# Patient Record
Sex: FEMALE | Race: WHITE | Employment: OTHER | URBAN - METROPOLITAN AREA
[De-identification: names, ages, dates, MRNs, and addresses within clinical notes are randomized per-mention and may not be internally consistent; named-entity substitution may affect disease eponyms.]

---

## 2017-03-14 ENCOUNTER — OFFICE VISIT (OUTPATIENT)
Dept: SURGERY | Age: 72
End: 2017-03-14

## 2017-03-14 VITALS
WEIGHT: 197 LBS | BODY MASS INDEX: 29.86 KG/M2 | TEMPERATURE: 98.2 F | HEIGHT: 68 IN | RESPIRATION RATE: 20 BRPM | DIASTOLIC BLOOD PRESSURE: 82 MMHG | SYSTOLIC BLOOD PRESSURE: 126 MMHG | HEART RATE: 82 BPM

## 2017-03-14 DIAGNOSIS — K64.8 INTERNAL AND EXTERNAL HEMORRHOIDS WITHOUT COMPLICATION: Primary | ICD-10-CM

## 2017-03-14 DIAGNOSIS — K64.4 INTERNAL AND EXTERNAL HEMORRHOIDS WITHOUT COMPLICATION: Primary | ICD-10-CM

## 2017-03-14 RX ORDER — GLUCOSAMINE HCL 500 MG
2000 TABLET ORAL DAILY
COMMUNITY

## 2017-03-14 RX ORDER — HYDROCHLOROTHIAZIDE 12.5 MG/1
12.5 TABLET ORAL DAILY
COMMUNITY
End: 2021-10-19

## 2017-03-14 RX ORDER — RANITIDINE 300 MG/1
300 CAPSULE ORAL DAILY
COMMUNITY
End: 2021-06-18

## 2017-03-14 RX ORDER — LOSARTAN POTASSIUM 50 MG/1
50 TABLET ORAL DAILY
COMMUNITY

## 2017-03-14 RX ORDER — MONTELUKAST SODIUM 10 MG/1
10 TABLET ORAL DAILY
COMMUNITY
End: 2021-06-18

## 2017-03-14 RX ORDER — CETIRIZINE HCL 10 MG
10 TABLET ORAL DAILY
COMMUNITY

## 2017-03-14 NOTE — PROGRESS NOTES
Patient presents for symptoms of IBS. She has previously seen Dr. Ravi Choe for internal hemorrhoid and sees Dr. Summer Smith for gastroenterology. Patient reports concerns over her GI lisy and states that she has had a hx of C-diff. She also is treated for GERD and has questions about which medications she should be taking. Her symptoms include abdominal pain with BM, frequency of bowel, loose stools alternating with constipation and spotting blood with BMs.

## 2017-03-14 NOTE — PATIENT INSTRUCTIONS
If you have any questions or concerns about today's appointment, the verbal and/or written instructions you were given for follow up care, please call our office at [phone]. [address] [facility] - Jazmin Thakkar [address] [place] [address], [address] [place] [address]    [phone] office  [phone]

## 2017-03-14 NOTE — LETTER
3/14/2017 1:58 PM 
 
Patient:  Courtney Bennett YOB: 1945 Date of Visit: 3/14/2017 Jimenez Bernabe MD 
[address] [place], [place] [id number] VIA Facsimile: [phone] Dear Jimenez Bernabe MD, Thank you for referring Ms. Courtney Bennett to [place] [address] for evaluation and treatment. Below are the relevant portions of my assessment and plan of care. Thank you very much for your referral of Ms. Courtney Bennett. If you have questions, please do not hesitate to call me. I look forward to following Ms. Lino along with you and will keep you updated as to her progress. Sincerely, Sylvia Marie MD

## 2017-03-14 NOTE — PROGRESS NOTES
[facility]  Colon and Rectal Surgery              Colon and Rectal Surgery        Patient: Suly Coello  MRN: [id number]  Date: 3/14/2017     Age:  73 y.o.,      Sex: female    YOB: 1945      Subjective    Ms. Alessio Rodriguez is an 73 y.o. female referred by Dr. Emir Cruz. She presents with intermittent anal discomfort and rare episodes of bright red rectal bleeding mainly on tissue post defecation. The patient's main concern is her chronic irritable bowel syndrome with alternating constipation and mainly loose frequent stools and diarrhea along with abdominal cramps and pain. This has been present since 2010, and colonoscopy at that time was noted to be unremarkable as per the patient. With frequent loose stools, the patient complains of anal itching and burning while she experiences bleeding with constipation. She has not had previous rectal surgery. Previous treatments have included topical interventions with Anal pram with success.  denies associated fever. A history of inflammatory bowel disease has not been reported. Otherwise the patient denies any change in bowel habits, weight changes, nor any nausea, emesis. The family history is negative for colon cancer/polyps, other GI malignancies, nor inflammatory bowel diseases. Past Medical History:   Diagnosis Date    Breast cancer [facility])     age 41    C. difficile diarrhea     GERD (gastroesophageal reflux disease)     HTN (hypertension)     Stool color black        Past Surgical History:   Procedure Laterality Date    HX COLONOSCOPY  2010    HX MASTECTOMY Right     age 41       Allergies   Allergen Reactions    Azithromycin Diarrhea    Erythromycin Diarrhea     GI distress       Prior to Admission medications    Medication Sig Start Date End Date Taking? Authorizing Provider   raNITIdine hcl 300 mg cap Take 300 mg by mouth daily.    Yes Historical Provider   losartan (COZAAR) 50 mg tablet Take 50 mg by mouth daily. Yes Historical Provider   hydroCHLOROthiazide (HYDRODIURIL) 12.5 mg tablet Take 12.5 mg by mouth daily. Yes Historical Provider   montelukast (SINGULAIR) 10 mg tablet Take 10 mg by mouth daily. Yes Historical Provider   cetirizine (ZYRTEC) 10 mg tablet Take 10 mg by mouth daily. Yes Historical Provider   OMEPRAZOLE PO Take 40 mg by mouth daily. Yes Historical Provider   Cholecalciferol, Vitamin D3, 3,000 unit tab Take 2,000 Units by mouth daily. Yes Historical Provider   Bifidobacterium Infantis (ALIGN) 4 mg cap Take  by mouth. Yes Historical Provider   BUDESONIDE/FORMOTEROL FUMARATE (SYMBICORT IN) Take  by inhalation. Historical Provider   candesartan (ATACAND) 16 mg tablet Take  by mouth daily. Historical Provider       Current Outpatient Prescriptions   Medication Sig Dispense Refill    raNITIdine hcl 300 mg cap Take 300 mg by mouth daily.  losartan (COZAAR) 50 mg tablet Take 50 mg by mouth daily.  hydroCHLOROthiazide (HYDRODIURIL) 12.5 mg tablet Take 12.5 mg by mouth daily.  montelukast (SINGULAIR) 10 mg tablet Take 10 mg by mouth daily.  cetirizine (ZYRTEC) 10 mg tablet Take 10 mg by mouth daily.  OMEPRAZOLE PO Take 40 mg by mouth daily.  Cholecalciferol, Vitamin D3, 3,000 unit tab Take 2,000 Units by mouth daily.  Bifidobacterium Infantis (ALIGN) 4 mg cap Take  by mouth.  BUDESONIDE/FORMOTEROL FUMARATE (SYMBICORT IN) Take  by inhalation.  candesartan (ATACAND) 16 mg tablet Take  by mouth daily. Social History     Social History    Marital status:      Spouse name: N/A    Number of children: N/A    Years of education: N/A     Occupational History    Not on file.      Social History Main Topics    Smoking status: Former Smoker    Smokeless tobacco: Never Used    Alcohol use Not on file    Drug use: Not on file    Sexual activity: Not on file     Other Topics Concern    Not on file     Social History Narrative    No narrative on file       Family History   Problem Relation Age of Onset    Cancer Mother 80     breast           Review of Systems:    A comprehensive review of systems was negative except for that written in the History of Present Illness. Objective:        Visit Vitals    /82    Pulse 82    Temp 98.2 °F (36.8 °C) (Oral)    Resp 20    Ht 5' 8\" (1.727 m)    Wt 89.4 kg (197 lb)    BMI 29.95 kg/m2       Physical Exam:   GENERAL: alert, cooperative, no distress, appears stated age  LUNG: clear to auscultation bilaterally  HEART: regular rate and rhythm, S1, S2 normal, no murmur, click, rub or gallop  ABDOMEN: soft, non-tender. Bowel sounds normal. No masses,  no organomegaly  EXTREMITIES:  extremities normal, atraumatic, no cyanosis or edema     Anorectal:  With the patient in the left lateral position the anus appeared abnormal with findings of a very mild mixed hemorrhoid disease in the right anterior quadrant. Digital rectal examination revealed Normal sphincter tone and squeeze pressure. Palpation revealed No Masses. Anoscopy revealed the mild internal and external hemorrhoid disease without any bleeding noted. Assessment / Keyon Wilson is an 73 y.o. female with very mild internal and external hemorrhoid disease. I reassure her and recommended the hemorrhoid management regimen consisting of:  1. Frequent sitz baths at home avoiding any direct soap contact with the anus. 2. Desitin ointment to perianal area when experiencing loose stools to diarrhea to avoid perianal skin rash. .  3. Application of Analpram cream as needed. The patient will inform me of her progress. She will also be following up in the GI clinic as scheduled. Thank you for allowing me to participate in the patient's care.             Adán Lundberg MD, FACS, FASCRS  Colon and Rectal Surgery  [address] [place] [address] [facility]  Office [phone]  Fax     [phone]  3/14/2017  2:15 PM

## 2020-09-01 ENCOUNTER — OFFICE VISIT (OUTPATIENT)
Dept: ORTHOPEDIC SURGERY | Age: 75
End: 2020-09-01
Payer: MEDICARE

## 2020-09-01 VITALS — BODY MASS INDEX: 33.34 KG/M2 | HEIGHT: 68 IN | WEIGHT: 220 LBS | TEMPERATURE: 97 F

## 2020-09-01 DIAGNOSIS — S52.531A CLOSED COLLES' FRACTURE OF RIGHT RADIUS, INITIAL ENCOUNTER: Primary | ICD-10-CM

## 2020-09-01 DIAGNOSIS — M25.531 RIGHT WRIST PAIN: ICD-10-CM

## 2020-09-01 PROCEDURE — 25600 CLTX DST RDL FX/EPHYS SEP WO: CPT | Performed by: ORTHOPAEDIC SURGERY

## 2020-09-01 PROCEDURE — 99203 OFFICE O/P NEW LOW 30 MIN: CPT | Performed by: ORTHOPAEDIC SURGERY

## 2020-09-01 PROCEDURE — L3908 WHO COCK-UP NONMOLDE PRE OTS: HCPCS | Performed by: ORTHOPAEDIC SURGERY

## 2020-09-01 PROCEDURE — G8427 DOCREV CUR MEDS BY ELIG CLIN: HCPCS | Performed by: ORTHOPAEDIC SURGERY

## 2020-09-01 NOTE — PATIENT INSTRUCTIONS

## 2020-09-01 NOTE — PROGRESS NOTES
Name: Valerie Elena    : 1945  Service Dept: [address] MEDICINE       Chief Complaint   Patient presents with    Wrist Pain     right        Patient's Pharmacies:    [facility] [address], [address] [facility]  [address] [address]  Phone: [phone] Fax: [phone] [facility] [address] [facility] [address], [address] [place]  [address] [facility] [id number]  Phone: [phone] Fax: [phone]       Visit Vitals  Temp 97 °F (36.1 °C)   Ht 5' 8\" (1.727 m)   Wt 220 lb (99.8 kg)   BMI 33.45 kg/m²        Allergies   Allergen Reactions    Azithromycin Diarrhea    Erythromycin Diarrhea     GI distress        Current Outpatient Medications   Medication Sig Dispense Refill    raNITIdine hcl 300 mg cap Take 300 mg by mouth daily.  losartan (COZAAR) 50 mg tablet Take 50 mg by mouth daily.  hydroCHLOROthiazide (HYDRODIURIL) 12.5 mg tablet Take 12.5 mg by mouth daily.  montelukast (SINGULAIR) 10 mg tablet Take 10 mg by mouth daily.  cetirizine (ZYRTEC) 10 mg tablet Take 10 mg by mouth daily.  OMEPRAZOLE PO Take 40 mg by mouth daily.  Cholecalciferol, Vitamin D3, 3,000 unit tab Take 2,000 Units by mouth daily.  candesartan (ATACAND) 16 mg tablet Take  by mouth daily.  Bifidobacterium Infantis (ALIGN) 4 mg cap Take  by mouth.  BUDESONIDE/FORMOTEROL FUMARATE (SYMBICORT IN) Take  by inhalation.             Patient Active Problem List   Diagnosis Code    Internal and external hemorrhoids without complication Z78.9, S34.1        Family History   Problem Relation Age of Onset    Cancer Mother 80        breast        Social History     Socioeconomic History    Marital status:      Spouse name: Not on file    Number of children: Not on file    Years of education: Not on file    Highest education level: Not on file   Tobacco Use    Smoking status: Former Smoker    Smokeless tobacco: Never Used   Substance and Sexual Activity    Alcohol use: Never     Frequency: Never        Past Surgical History:   Procedure Laterality Date    HX COLONOSCOPY  2010    HX MASTECTOMY Right     age 41        Past Medical History:   Diagnosis Date    Asthma     Breast cancer ([address]     age 41   Whitney C. difficile diarrhea     GERD (gastroesophageal reflux disease)     HTN (hypertension)     Stool color black         I have reviewed and agree with PFSH and ROS and intake form in chart and the record. Review of Systems:   Patient is a pleasant appearing individual, appropriately dressed, well hydrated, well nourished, who is alert, appropriately oriented for age, and in no acute distress with a normal gait and normal affect who does not appear to be in any significant pain. Physical Exam:  Right wrist and hand - grossly neurovascularly intact, good cap refill, positive point tenderness in the area of the fracture(s) , positive soft tissue swelling, decreased range of motion and strength, skin intact. Left wrist and hand - Grossly neurovascularly intact, good cap refill, full range of motion, no weakness, no swelling, no point tenderness, no skin lesions. Encounter Diagnoses     ICD-10-CM ICD-9-CM   1. Closed Colles' fracture of right radius, initial encounter  S52.531A 813.41   2. Right wrist pain  M25.531 719.43          Scribed by Nirav Roldan LPN as dictated by [facility] MOUNIKA Werner MD.    HPI:  The patient is here with a chief complaint of right wrist pain, throbbing pain. The patient fell on 8/31/2020. It has been the same. Naprosyn has helped. Using it makes it worse. Pain is 6/10. ROS:  10-point review of systems is unremarkable. X-rays are positive for nondisplaced styloid fracture done at [facility].      Assessment/Plan:  1. Right distal radius styloid fracture with non-displacement.       Plan at this point wrist splint, ice, elevate, antiinflammatories. My PA will see her back in four weeks for repeat x-rays of the right wrist, AP lateral and obliques. If it looks good she can start weaning off the splint. No restrictions at approximately 6-8 weeks from the time of injury and go from there. Return to Office: Follow-up Information    None             Documentation True and Accepted Nino Zamudio MD

## 2020-09-01 NOTE — PROGRESS NOTES
Providers protocol for the intake nurse to complete in patient's chart:    Yao Rodriguez presents today for   Chief Complaint   Patient presents with    Wrist Pain     right       Reason for visit - from intake sheet  Pain assessment  -  from intake sheet  Height - from intake sheet  Weight - from intake sheet  Medical Conditions - from intake sheet  Family medical history - from intake sheet  Social History, alcohol, smoking - from the intake sheet  The PHQ2 only questions - from the intake sheet  Learning Assessment - from the intake sheet    Temperature is taken by ROSE MARIE MORALEZ - written on intake sheet  Travel Screening done by ROSE MARIE CASTILLO [facility]    Provider will complete patient's chart

## 2020-09-30 ENCOUNTER — OFFICE VISIT (OUTPATIENT)
Dept: ORTHOPEDIC SURGERY | Age: 75
End: 2020-09-30

## 2020-09-30 DIAGNOSIS — M25.531 RIGHT WRIST PAIN: ICD-10-CM

## 2020-09-30 DIAGNOSIS — S52.531A CLOSED COLLES' FRACTURE OF RIGHT RADIUS, INITIAL ENCOUNTER: Primary | ICD-10-CM

## 2020-09-30 NOTE — PROGRESS NOTES
[facility] Sports Medicine  General Follow up    Subjective:    Corrie winter a 70 y.o. female presents for follow up after nothing of the right distal radius styloid fracture with nondisplacement on 31 August 2020. Pain is generally well controlled. Patient is generally without complaint. She also has noticed a \"bump,\" at the base of her pointer finger on her right hand. She is unsure how long it is been there but states that she has noticed that because her hand has been banging against her splint. ROS  Patient is a pleasant appearing individual, appropriately dressed, well hydrated, well nourished, who is alert, appropriately oriented for age, and in no acute distress with a normal gait and normal affect who does not appear to be in any significant pain. Objective:     VSS, AFEB      Physical Exam  Right wrist and hand - grossly neurovascularly intact, good cap refill, positive point tenderness in the area of the fracture(s) , positive soft tissue swelling, decreased range of motion and strength, skin intact. There is a small firm bump over the patient's second right MCP joint. No erythema induration or fluctuance. No drainage or warmth.     Left wrist and hand - Grossly neurovascularly intact, good cap refill, full range of motion, no weakness, no swelling, no point tenderness, no skin lesions. Assessment:     Closed Colles' fracture of right radius, initial encounter [S52.324F]   Problem List Items Addressed This Visit     None      Visit Diagnoses     Closed Colles' fracture of right radius, initial encounter    -  Primary    Right wrist pain             Doing well overall. Ms. Sofía Donis has a reminder for a \"due or due soon\" health maintenance. I have asked that she contact her primary care provider for follow-up on this health maintenance.     X-rays reviewed with Dr. Gypsy Vincent: reveals a well-healing right distal radius styloid fracture, nondisplaced with good callus formation. Radiolucency still present over the right second MCP, possibly ganglion. Plan:     --The fact that x-rays look good, the patient is been following the plan of splint ice elevation anti-inflammatories, we will start weaning the splint. There will be no restrictions at approximately 6 to 8 weeks from the time of injury. Signs of worsening her condition were reviewed and should they occur she will not hesitate to make us aware. She will follow-up here on an as-needed basis. With regard to the \"bump,\" on her right first knuckle it does not appear to be infectious in nature. Has not grown she is just noticed that it was there as a result of the splint knocking into it. It was present radiographically at the time of her last visit. If it begins to grow or change in any way she will not hesitate to let us know. It is not tender. She will follow-up here with regards to any other issues on an as-needed basis. Questions were solicited and answered to the patient's satisfaction and the patient will follow-up as discussed.       Signed By: Hodan Bonner PA-C     September 30, 2020

## 2021-03-16 ENCOUNTER — OFFICE VISIT (OUTPATIENT)
Dept: ORTHOPEDIC SURGERY | Age: 76
End: 2021-03-16
Payer: MEDICARE

## 2021-03-16 VITALS — BODY MASS INDEX: 34.53 KG/M2 | HEIGHT: 67 IN | WEIGHT: 220 LBS

## 2021-03-16 DIAGNOSIS — G89.29 CHRONIC PAIN OF RIGHT KNEE: Primary | ICD-10-CM

## 2021-03-16 DIAGNOSIS — M17.11 PRIMARY OSTEOARTHRITIS OF RIGHT KNEE: ICD-10-CM

## 2021-03-16 DIAGNOSIS — M25.561 CHRONIC PAIN OF RIGHT KNEE: Primary | ICD-10-CM

## 2021-03-16 PROCEDURE — 3017F COLORECTAL CA SCREEN DOC REV: CPT | Performed by: ORTHOPAEDIC SURGERY

## 2021-03-16 PROCEDURE — G8417 CALC BMI ABV UP PARAM F/U: HCPCS | Performed by: ORTHOPAEDIC SURGERY

## 2021-03-16 PROCEDURE — G8427 DOCREV CUR MEDS BY ELIG CLIN: HCPCS | Performed by: ORTHOPAEDIC SURGERY

## 2021-03-16 PROCEDURE — 99214 OFFICE O/P EST MOD 30 MIN: CPT | Performed by: ORTHOPAEDIC SURGERY

## 2021-03-16 PROCEDURE — 20611 DRAIN/INJ JOINT/BURSA W/US: CPT | Performed by: ORTHOPAEDIC SURGERY

## 2021-03-16 PROCEDURE — G8510 SCR DEP NEG, NO PLAN REQD: HCPCS | Performed by: ORTHOPAEDIC SURGERY

## 2021-03-16 PROCEDURE — G8536 NO DOC ELDER MAL SCRN: HCPCS | Performed by: ORTHOPAEDIC SURGERY

## 2021-03-16 PROCEDURE — 1101F PT FALLS ASSESS-DOCD LE1/YR: CPT | Performed by: ORTHOPAEDIC SURGERY

## 2021-03-16 PROCEDURE — G8400 PT W/DXA NO RESULTS DOC: HCPCS | Performed by: ORTHOPAEDIC SURGERY

## 2021-03-16 PROCEDURE — 1090F PRES/ABSN URINE INCON ASSESS: CPT | Performed by: ORTHOPAEDIC SURGERY

## 2021-03-16 RX ORDER — LIDOCAINE HYDROCHLORIDE 10 MG/ML
9 INJECTION INFILTRATION; PERINEURAL ONCE
Status: COMPLETED | OUTPATIENT
Start: 2021-03-16 | End: 2021-03-16

## 2021-03-16 RX ORDER — TRIAMCINOLONE ACETONIDE 40 MG/ML
40 INJECTION, SUSPENSION INTRA-ARTICULAR; INTRAMUSCULAR ONCE
Status: COMPLETED | OUTPATIENT
Start: 2021-03-16 | End: 2021-03-16

## 2021-03-16 RX ADMIN — TRIAMCINOLONE ACETONIDE 40 MG: 40 INJECTION, SUSPENSION INTRA-ARTICULAR; INTRAMUSCULAR at 14:56

## 2021-03-16 RX ADMIN — LIDOCAINE HYDROCHLORIDE 9 ML: 10 INJECTION INFILTRATION; PERINEURAL at 14:56

## 2021-03-16 NOTE — PATIENT INSTRUCTIONS
Knee Arthritis: Care Instructions Your Care Instructions Knee arthritis is a breakdown of the cartilage that cushions your knee joint. When the cartilage wears down, your bones rub against each other. This causes pain and stiffness. Knee arthritis tends to get worse with time. Treatment for knee arthritis involves reducing pain, making the leg muscles stronger, and staying at a healthy body weight. The treatment usually does not improve the health of the cartilage, but it can reduce pain and improve how well your knee works. You can take simple measures to protect your knee joints, ease your pain, and help you stay active. Follow-up care is a key part of your treatment and safety. Be sure to make and go to all appointments, and call your doctor if you are having problems. It's also a good idea to know your test results and keep a list of the medicines you take. How can you care for yourself at home? · Know that knee arthritis will cause more pain on some days than on others. · Stay at a healthy weight. Lose weight if you are overweight. When you stand up, the pressure on your knees from every pound of body weight is multiplied four times. So if you lose 10 pounds, you will reduce the pressure on your knees by 40 pounds. · Talk to your doctor or physical therapist about exercises that will help ease joint pain. ? Stretch to help prevent stiffness and to prevent injury before you exercise. You may enjoy gentle forms of yoga to help keep your knee joints and muscles flexible. ? Walk instead of jog. 
? Ride a bike. This makes your thigh muscles stronger and takes pressure off your knee. ? Wear well-fitting and comfortable shoes. ? Exercise in chest-deep water. This can help you exercise longer with less pain. ? Avoid exercises that include squatting or kneeling. They can put a lot of strain on your knees.  
? Talk to your doctor to make sure that the exercise you do is not making the arthritis worse. 
· Do not sit for long periods of time. Try to walk once in a while to keep your knee from getting stiff. · Ask your doctor or physical therapist whether shoe inserts may reduce your arthritis pain. · If you can afford it, get new athletic shoes at least every year. This can help reduce the strain on your knees. · Use a device to help you do everyday activities. ? A cane or walking stick can help you keep your balance when you walk. Hold the cane or walking stick in the hand opposite the painful knee. ? If you feel like you may fall when you walk, try using crutches or a front-wheeled walker. These can prevent falls that could cause more damage to your knee. ? A knee brace may help keep your knee stable and prevent pain. ? You also can use other things to make life easier, such as a higher toilet seat and handrails in the bathtub or shower. · Take pain medicines exactly as directed. ? Do not wait until you are in severe pain. You will get better results if you take it sooner. ? If you are not taking a prescription pain medicine, take an over-the-counter medicine such as acetaminophen (Tylenol), ibuprofen (Advil, Motrin), or naproxen (Aleve). Read and follow all instructions on the label. ? Do not take two or more pain medicines at the same time unless the doctor told you to. Many pain medicines have acetaminophen, which is Tylenol. Too much acetaminophen (Tylenol) can be harmful. ? Tell your doctor if you take a blood thinner, have diabetes, or have allergies to shellfish. · Ask your doctor if you might benefit from a shot of steroid medicine into your knee. This may provide pain relief for several months. · Many people take the supplements glucosamine and chondroitin for osteoarthritis. Some people feel they help, but the medical research does not show that they work. Talk to your doctor before you take these supplements. When should you call for help?  
 Call your doctor now or seek immediate medical care if: 
  · You have sudden swelling, warmth, or pain in your knee.  
  · You have knee pain and a fever or rash.  
  · You have such bad pain that you cannot use your knee. Watch closely for changes in your health, and be sure to contact your doctor if you have any problems. Where can you learn more? Go to http://www.gray.com/ Enter Q465 in the search box to learn more about \"Knee Arthritis: Care Instructions. \" Current as of: December 9, 2019               Content Version: 12.6 © 7390-7137 [employer]. Care instructions adapted under license by [employer] (which disclaims liability or warranty for this information). If you have questions about a medical condition or this instruction, always ask your healthcare professional. [address] any warranty or liability for your use of this information.

## 2021-03-16 NOTE — LETTER
Bobby Pratt  
1945 [id number]  
 
 
3/16/2021 I hereby authorize and direct Nino Ann MD, Fernando Stokes, and whomever he may designate as his associate to perform upon myself the following procedure: 
 
Injection of: Kenalog, Supartz, Euflexxa, Orthovisc in the Right/Left ____________________. If any unforeseen condition arises in the course of the procedure, I further authorize him and his associated and/or assistant(s) to do whatever he/she deems advisable. The nature, purpose, benefits, risks, side effects, likelihood of achieving goals, and potential problems that might occur during recuperation, risks for not receiving the proposed care, treatment and services and alternatives of the procedure have been fully explained to me by my physician including, but not limited to: 
 
Swelling, joint pain, skin pigment changes, worsening of condition, and failure to improve. I acknowledge that no guarantee or assurance has been made to me as to the results that may be obtained or the likelihood of success. _______________________________________ Signature of patient or authorized representative [employer] [facility] fax: [phone]

## 2021-03-16 NOTE — PROGRESS NOTES
Name: Dwight Otero    : 1945     Service Dept: [address] [facility]    Patient's Pharmacies:    [facility] [address], [id number] [address]  Phone: [phone] Fax: [phone] [address] [address] [facility] [address], [address] [place]  [address] [place] [address]  [phone] [id number]  Phone: [phone] Fax: [phone]       Chief Complaint   Patient presents with    Knee Pain        Visit Vitals   5' 7\" (1.702 m)   Wt 220 lb (99.8 kg)   BMI 34.46 kg/m²      Allergies   Allergen Reactions    Azithromycin Diarrhea    Erythromycin Diarrhea     GI distress      Current Outpatient Medications   Medication Sig Dispense Refill    raNITIdine hcl 300 mg cap Take 300 mg by mouth daily.  losartan (COZAAR) 50 mg tablet Take 50 mg by mouth daily.  hydroCHLOROthiazide (HYDRODIURIL) 12.5 mg tablet Take 12.5 mg by mouth daily.  montelukast (SINGULAIR) 10 mg tablet Take 10 mg by mouth daily.  cetirizine (ZYRTEC) 10 mg tablet Take 10 mg by mouth daily.  OMEPRAZOLE PO Take 40 mg by mouth daily.  Cholecalciferol, Vitamin D3, 3,000 unit tab Take 2,000 Units by mouth daily.  Bifidobacterium Infantis (ALIGN) 4 mg cap Take  by mouth.  BUDESONIDE/FORMOTEROL FUMARATE (SYMBICORT IN) Take  by inhalation.  candesartan (ATACAND) 16 mg tablet Take  by mouth daily.           Patient Active Problem List   Diagnosis Code    Internal and external hemorrhoids without complication V46.0, D05.8      Family History   Problem Relation Age of Onset    Cancer Mother 80        breast      Social History     Socioeconomic History    Marital status:      Spouse name: Not on file    Number of children: Not on file    Years of education: Not on file    Highest education level: Not on file   Tobacco Use    Smoking status: Former Smoker    Smokeless tobacco: Never Used Substance and Sexual Activity    Alcohol use: Never     Frequency: Never      Past Surgical History:   Procedure Laterality Date    HX COLONOSCOPY  2010    HX MASTECTOMY Right     age 41      Past Medical History:   Diagnosis Date    Asthma     Breast cancer ([address]     age 41   [facility] C. difficile diarrhea     GERD (gastroesophageal reflux disease)     HTN (hypertension)     Stool color black         I have reviewed and agree with PFSH and ROS and intake form in chart and the record furthermore I have reviewed prior medical record(s) regarding this patients care during this appointment. Review of Systems:   Patient is a pleasant appearing individual, appropriately dressed, well hydrated, well nourished, who is alert, appropriately oriented for age, and in no acute distress with a normal gait and normal affect who does not appear to be in any significant pain. Physical Exam:  Right Knee -Decrease range of motion with flexion, Knee arc of greater than 50 degrees, Some crepitation, Grossly neurovascularly intact, Good cap refill, No skin lesion, Moderate swelling, No gross instability, Some quadriceps weakness, greater than 50 degree arc    Left Knee - Full Range of Motion, No crepitation, Grossly neurovascularly intact, Good cap refill, No skin lesion, No swelling, No gross instability, No quadriceps weakness    Procedure Documentation:    I discussed in detail the risks, benefits and complications of an injection which included but are not limited to infection, skin reactions, hot swollen joint, and anaphylaxis with the patient. The patient verbalized understanding and gave informed consent for the injection. The patient's knee was flexed to 90° and the skin prepped using sterile alcohol solution. A sterile needle was inserted into the right knee and the mixture of 9 mL Lidocaine 1%, 1 mL Kenalog 40 mg was injected under sterile technique.  The needle was withdrawn and the puncture site sealed with a Band-Aid. Technique: Under sterile conditions a [employer] ultrasound unit with a variable frequency (7.0-14.0 MHz) linear transducer was used to localize the placement of needle into the right knee joint. Findings: Successful needle placement for knee injection. Final images were taken and saved for permanent record. The patient tolerated the injection well. The patient was instructed to call the office immediately if there is any pain, redness, warmth, fever, or chills. Encounter Diagnoses     ICD-10-CM ICD-9-CM   1. Chronic pain of right knee  M25.561 719.46    G89.29 338.29   2. Primary osteoarthritis of right knee  M17.11 715.16       HPI:  The patient is here with a chief complaint of right knee pain, dull, throbbing pain, progressively getting worse. Pain is 8/10. ROS:  10-point review of systems is unremarkable. X-rays are positive for mild OA. Assessment/Plan:  1. Right knee arthritic flare. Plan will be for cortisone injection. See the patient back in 1 week. If not better, we will consider an MRI of the right knee. As part of continued conservative pain management options the patient was advised to utilize Tylenol or OTC NSAIDS as long as it is not medically contraindicated. Return to Office: Follow-up and Dispositions    · Return in about 1 week (around 3/23/2021). Administrations This Visit     lidocaine (XYLOCAINE) 10 mg/mL (1 %) injection 9 mL     Admin Date  03/16/2021 Action  Given Dose  9 mL Route  Other Administered By  Faby Calabrese MD          triamcinolone acetonide (KENALOG-40) 40 mg/mL injection 40 mg     Admin Date  03/16/2021 Action  Given Dose  40 mg Route  Intra artICUlar Administered By  Faby Calabrese MD               Scribed by Jaime Lr MD as dictated by Deepak Mckeon. Vicki Kingsley MD.  Documentation True and Accepted Nino Kingsley MD

## 2021-03-23 ENCOUNTER — OFFICE VISIT (OUTPATIENT)
Dept: ORTHOPEDIC SURGERY | Age: 76
End: 2021-03-23
Payer: MEDICARE

## 2021-03-23 DIAGNOSIS — M17.11 OSTEOARTHRITIS OF RIGHT KNEE, UNSPECIFIED OSTEOARTHRITIS TYPE: Primary | ICD-10-CM

## 2021-03-23 PROCEDURE — 1101F PT FALLS ASSESS-DOCD LE1/YR: CPT | Performed by: ORTHOPAEDIC SURGERY

## 2021-03-23 PROCEDURE — 1090F PRES/ABSN URINE INCON ASSESS: CPT | Performed by: ORTHOPAEDIC SURGERY

## 2021-03-23 PROCEDURE — G8536 NO DOC ELDER MAL SCRN: HCPCS | Performed by: ORTHOPAEDIC SURGERY

## 2021-03-23 PROCEDURE — G8432 DEP SCR NOT DOC, RNG: HCPCS | Performed by: ORTHOPAEDIC SURGERY

## 2021-03-23 PROCEDURE — G8427 DOCREV CUR MEDS BY ELIG CLIN: HCPCS | Performed by: ORTHOPAEDIC SURGERY

## 2021-03-23 PROCEDURE — 3017F COLORECTAL CA SCREEN DOC REV: CPT | Performed by: ORTHOPAEDIC SURGERY

## 2021-03-23 PROCEDURE — G8417 CALC BMI ABV UP PARAM F/U: HCPCS | Performed by: ORTHOPAEDIC SURGERY

## 2021-03-23 PROCEDURE — G8400 PT W/DXA NO RESULTS DOC: HCPCS | Performed by: ORTHOPAEDIC SURGERY

## 2021-03-23 PROCEDURE — 99213 OFFICE O/P EST LOW 20 MIN: CPT | Performed by: ORTHOPAEDIC SURGERY

## 2021-03-23 NOTE — PROGRESS NOTES
Name: Alvaro Fernando    : 1945     Service Dept: [address] [facility]    Patient's Pharmacies:    [facility], [address] [place], [place] [address]  Phone: [phone] Fax: [phone] [address] [address] [facility] [address], [address] [place]  [address] [employer]  [phone]  Phone: [phone] Fax: [phone]       Chief Complaint   Patient presents with    Knee Pain        There were no vitals taken for this visit. Allergies   Allergen Reactions    Azithromycin Diarrhea    Erythromycin Diarrhea     GI distress      Current Outpatient Medications   Medication Sig Dispense Refill    raNITIdine hcl 300 mg cap Take 300 mg by mouth daily.  losartan (COZAAR) 50 mg tablet Take 50 mg by mouth daily.  hydroCHLOROthiazide (HYDRODIURIL) 12.5 mg tablet Take 12.5 mg by mouth daily.  montelukast (SINGULAIR) 10 mg tablet Take 10 mg by mouth daily.  cetirizine (ZYRTEC) 10 mg tablet Take 10 mg by mouth daily.  OMEPRAZOLE PO Take 40 mg by mouth daily.  Cholecalciferol, Vitamin D3, 3,000 unit tab Take 2,000 Units by mouth daily.  Bifidobacterium Infantis (ALIGN) 4 mg cap Take  by mouth.  BUDESONIDE/FORMOTEROL FUMARATE (SYMBICORT IN) Take  by inhalation.  candesartan (ATACAND) 16 mg tablet Take  by mouth daily.           Patient Active Problem List   Diagnosis Code    Internal and external hemorrhoids without complication Y18.9, M27.5      Family History   Problem Relation Age of Onset    Cancer Mother 80        breast      Social History     Socioeconomic History    Marital status:      Spouse name: Not on file    Number of children: Not on file    Years of education: Not on file    Highest education level: Not on file   Tobacco Use    Smoking status: Former Smoker    Smokeless tobacco: Never Used   Substance and Sexual Activity  Alcohol use: Never     Frequency: Never      Past Surgical History:   Procedure Laterality Date    HX COLONOSCOPY  2010    HX MASTECTOMY Right     age 41      Past Medical History:   Diagnosis Date    Asthma     Breast cancer ([address]     age 41   [facility] C. difficile diarrhea     GERD (gastroesophageal reflux disease)     HTN (hypertension)     Stool color black         I have reviewed and agree with PFSH and ROS and intake form in chart and the record furthermore I have reviewed prior medical record(s) regarding this patients care during this appointment. Review of Systems:   Patient is a pleasant appearing individual, appropriately dressed, well hydrated, well nourished, who is alert, appropriately oriented for age, and in no acute distress with a normal gait and normal affect who does not appear to be in any significant pain. Physical Exam:  Right Knee -Decrease range of motion with flexion, Knee arc of greater than 50 degrees, Some crepitation, Grossly neurovascularly intact, Good cap refill, No skin lesion, Moderate swelling, No gross instability, Some quadriceps weakness, greater than 50 degree arc    Left Knee - Full Range of Motion, No crepitation, Grossly neurovascularly intact, Good cap refill, No skin lesion, No swelling, No gross instability, No quadriceps weakness   Encounter Diagnoses     ICD-10-CM ICD-9-CM   1. Osteoarthritis of right knee, unspecified osteoarthritis type  M17.11 715.96       HPI:  The patient is here with a chief complaint of right knee pain, throbbing, burning pain, progressively getting a little bit better, post cortisone injection. Pain is 4/10. ROS:  10-point review of systems is unremarkable. Assessment/Plan:  Plan at this point, activities as tolerated, weightbearing started, no restrictions. We will see the patient back as needed and go from there.       As part of continued conservative pain management options the patient was advised to utilize Tylenol or OTC NSAIDS as long as it is not medically contraindicated. Return to Office:         Scribed by Kaykay Guerrero LPN as dictated by [facility] MOUNIKA Villegas MD.  Documentation True and Accepted Nino MOUNIKA Villegas MD

## 2021-03-23 NOTE — PATIENT INSTRUCTIONS
Knee Pain or Injury: Care Instructions Your Care Instructions Injuries are a common cause of knee problems. Sudden (acute) injuries may be caused by a direct blow to the knee. They can also be caused by abnormal twisting, bending, or falling on the knee. Pain, bruising, or swelling may be severe, and may start within minutes of the injury. Overuse is another cause of knee pain. Other causes are climbing stairs, kneeling, and other activities that use the knee. Everyday wear and tear, especially as you get older, also can cause knee pain. Rest, along with home treatment, often relieves pain and allows your knee to heal. If you have a serious knee injury, you may need tests and treatment. Follow-up care is a key part of your treatment and safety. Be sure to make and go to all appointments, and call your doctor if you are having problems. It's also a good idea to know your test results and keep a list of the medicines you take. How can you care for yourself at home? · Be safe with medicines. Read and follow all instructions on the label. ? If the doctor gave you a prescription medicine for pain, take it as prescribed. ? If you are not taking a prescription pain medicine, ask your doctor if you can take an over-the-counter medicine. · Rest and protect your knee. Take a break from any activity that may cause pain. · Put ice or a cold pack on your knee for 10 to 20 minutes at a time. Put a thin cloth between the ice and your skin. · Prop up a sore knee on a pillow when you ice it or anytime you sit or lie down for the next 3 days. Try to keep it above the level of your heart. This will help reduce swelling. · If your knee is not swollen, you can put moist heat, a heating pad, or a warm cloth on your knee. · If your doctor recommends an elastic bandage, sleeve, or other type of support for your knee, wear it as directed.  
· Follow your doctor's instructions about how much weight you can put on your leg. Use a cane, crutches, or a walker as instructed. · Follow your doctor's instructions about activity during your healing process. If you can do mild exercise, slowly increase your activity. · Reach and stay at a healthy weight. Extra weight can strain the joints, especially the knees and hips, and make the pain worse. Losing even a few pounds may help. When should you call for help? Call 911 anytime you think you may need emergency care. For example, call if: 
  · You have symptoms of a blood clot in your lung (called a pulmonary embolism). These may include: 
? Sudden chest pain. ? Trouble breathing. ? Coughing up blood. Call your doctor now or seek immediate medical care if: 
  · You have severe or increasing pain.  
  · Your leg or foot turns cold or changes color.  
  · You cannot stand or put weight on your knee.  
  · Your knee looks twisted or bent out of shape.  
  · You cannot move your knee.  
  · You have signs of infection, such as: 
? Increased pain, swelling, warmth, or redness. ? Red streaks leading from the knee. ? Pus draining from a place on your knee. ? A fever.  
  · You have signs of a blood clot in your leg (called a deep vein thrombosis), such as: 
? Pain in your calf, back of the knee, thigh, or groin. ? Redness and swelling in your leg or groin. Watch closely for changes in your health, and be sure to contact your doctor if: 
  · You have tingling, weakness, or numbness in your knee.  
  · You have any new symptoms, such as swelling.  
  · You have bruises from a knee injury that last longer than 2 weeks.  
  · You do not get better as expected. Where can you learn more? Go to http://www.gray.com/ Enter K195 in the search box to learn more about \"Knee Pain or Injury: Care Instructions. \" Current as of: June 26, 2019               Content Version: 12.6 © 6271-6679 [employer].   
Care instructions adapted under license by [employer] (which disclaims liability or warranty for this information). If you have questions about a medical condition or this instruction, always ask your healthcare professional. [address] any warranty or liability for your use of this information.

## 2021-04-02 ENCOUNTER — TRANSCRIBE ORDER (OUTPATIENT)
Dept: SCHEDULING | Age: 76
End: 2021-04-02

## 2021-04-02 DIAGNOSIS — I73.9 CLAUDICATION (HCC): Primary | ICD-10-CM

## 2021-04-02 DIAGNOSIS — Z12.31 VISIT FOR SCREENING MAMMOGRAM: Primary | ICD-10-CM

## 2021-04-09 ENCOUNTER — HOSPITAL ENCOUNTER (OUTPATIENT)
Dept: GENERAL RADIOLOGY | Age: 76
Discharge: HOME | End: 2021-04-09
Attending: INTERNAL MEDICINE
Payer: MEDICARE

## 2021-04-09 ENCOUNTER — HOSPITAL ENCOUNTER (OUTPATIENT)
Dept: VASCULAR SURGERY | Age: 76
Discharge: HOME | End: 2021-04-09
Attending: INTERNAL MEDICINE
Payer: MEDICARE

## 2021-04-09 ENCOUNTER — TRANSCRIBE ORDER (OUTPATIENT)
Dept: REGISTRATION | Age: 76
End: 2021-04-09

## 2021-04-09 ENCOUNTER — HOSPITAL ENCOUNTER (OUTPATIENT)
Dept: MAMMOGRAPHY | Age: 76
Discharge: HOME | End: 2021-04-09
Attending: INTERNAL MEDICINE
Payer: MEDICARE

## 2021-04-09 DIAGNOSIS — Z12.31 VISIT FOR SCREENING MAMMOGRAM: ICD-10-CM

## 2021-04-09 DIAGNOSIS — I73.9 CLAUDICATION (HCC): ICD-10-CM

## 2021-04-09 DIAGNOSIS — J44.1 COPD EXACERBATION (HCC): ICD-10-CM

## 2021-04-09 DIAGNOSIS — J44.1 COPD EXACERBATION (HCC): Primary | ICD-10-CM

## 2021-04-09 LAB
LEFT ABI: 1.21
LEFT ANTERIOR TIBIAL: 162 MMHG
LEFT ARM BP: 144 MMHG
LEFT POSTERIOR TIBIAL: 174 MMHG
LEFT TBI: 0.71
LEFT TOE PRESSURE: 102 MMHG
RIGHT ABI: 1.22
RIGHT ANTERIOR TIBIAL: 151 MMHG
RIGHT POSTERIOR TIBIAL: 175 MMHG
RIGHT TBI: 0.49
RIGHT TOE PRESSURE: 70 MMHG

## 2021-04-09 PROCEDURE — 77067 SCR MAMMO BI INCL CAD: CPT

## 2021-04-09 PROCEDURE — 71046 X-RAY EXAM CHEST 2 VIEWS: CPT

## 2021-04-09 PROCEDURE — 93923 UPR/LXTR ART STDY 3+ LVLS: CPT

## 2021-04-27 ENCOUNTER — TELEPHONE (OUTPATIENT)
Dept: CARDIOLOGY CLINIC | Age: 76
End: 2021-04-27

## 2021-06-18 ENCOUNTER — HOSPITAL ENCOUNTER (EMERGENCY)
Age: 76
Discharge: HOME | End: 2021-06-18
Attending: EMERGENCY MEDICINE
Payer: MEDICARE

## 2021-06-18 VITALS
OXYGEN SATURATION: 99 % | HEIGHT: 68 IN | RESPIRATION RATE: 18 BRPM | TEMPERATURE: 98.1 F | WEIGHT: 220 LBS | BODY MASS INDEX: 33.34 KG/M2 | HEART RATE: 70 BPM | DIASTOLIC BLOOD PRESSURE: 70 MMHG | SYSTOLIC BLOOD PRESSURE: 122 MMHG

## 2021-06-18 DIAGNOSIS — R19.7 ABDOMINAL PAIN, VOMITING, AND DIARRHEA: Primary | ICD-10-CM

## 2021-06-18 DIAGNOSIS — R11.10 ABDOMINAL PAIN, VOMITING, AND DIARRHEA: Primary | ICD-10-CM

## 2021-06-18 DIAGNOSIS — R10.9 ABDOMINAL PAIN, VOMITING, AND DIARRHEA: Primary | ICD-10-CM

## 2021-06-18 LAB
ALBUMIN SERPL-MCNC: 3.8 G/DL (ref 3.5–4.7)
ALBUMIN/GLOB SERPL: 1.4 {RATIO}
ALP SERPL-CCNC: 79 U/L (ref 38–126)
ALT SERPL-CCNC: 29 U/L (ref 3–52)
ANION GAP SERPL CALC-SCNC: 9 MMOL/L
AST SERPL W P-5'-P-CCNC: 27 U/L (ref 14–74)
ATRIAL RATE: 66 BPM
BASOPHILS # BLD: 0.1 K/UL (ref 0–0.1)
BASOPHILS NFR BLD: 0 % (ref 0–2)
BILIRUB DIRECT SERPL-MCNC: 0.1 MG/DL (ref 0–0.3)
BILIRUB SERPL-MCNC: 0.4 MG/DL (ref 0.2–1)
BUN SERPL-MCNC: 23 MG/DL (ref 9–21)
BUN/CREAT SERPL: 26
CA-I BLD-MCNC: 8.5 MG/DL (ref 8.5–10.5)
CALCULATED P AXIS, ECG09: 74 DEGREES
CALCULATED R AXIS, ECG10: 10 DEGREES
CALCULATED T AXIS, ECG11: 26 DEGREES
CHLORIDE SERPL-SCNC: 101 MMOL/L (ref 94–111)
CO2 SERPL-SCNC: 29 MMOL/L (ref 21–33)
CREAT SERPL-MCNC: 0.9 MG/DL (ref 0.7–1.2)
DIAGNOSIS, 93000: NORMAL
EOSINOPHIL # BLD: 0.2 K/UL (ref 0–0.4)
EOSINOPHIL NFR BLD: 2 % (ref 0–5)
ERYTHROCYTE [DISTWIDTH] IN BLOOD BY AUTOMATED COUNT: 14.7 % (ref 11.6–14.5)
GLOBULIN SER CALC-MCNC: 2.7 G/DL
GLUCOSE SERPL-MCNC: 132 MG/DL (ref 70–110)
HCT VFR BLD AUTO: 49.5 % (ref 35–45)
HGB BLD-MCNC: 15.7 G/DL (ref 12–16)
IMM GRANULOCYTES # BLD AUTO: 0 K/UL
IMM GRANULOCYTES NFR BLD AUTO: 0 %
LIPASE SERPL-CCNC: 58 U/L (ref 10–57)
LYMPHOCYTES # BLD: 0.7 K/UL (ref 0.9–3.6)
LYMPHOCYTES NFR BLD: 6 % (ref 21–52)
MCH RBC QN AUTO: 28.7 PG (ref 24–34)
MCHC RBC AUTO-ENTMCNC: 31.7 G/DL (ref 31–37)
MCV RBC AUTO: 90.5 FL (ref 74–97)
MONOCYTES # BLD: 1 K/UL (ref 0.05–1.2)
MONOCYTES NFR BLD: 8 % (ref 3–10)
NEUTS SEG # BLD: 10.3 K/UL (ref 1.8–8)
NEUTS SEG NFR BLD: 84 % (ref 40–73)
P-R INTERVAL, ECG05: 189 MS
PLATELET # BLD AUTO: 174 K/UL (ref 135–420)
PMV BLD AUTO: 10.7 FL (ref 9.2–11.8)
POTASSIUM SERPL-SCNC: 4 MMOL/L (ref 3.2–5.1)
PROT SERPL-MCNC: 6.5 G/DL (ref 6.1–8.4)
Q-T INTERVAL, ECG07: 437 MS
QRS DURATION, ECG06: 92 MS
QTC CALCULATION (BEZET), ECG08: 455 MS
RBC # BLD AUTO: 5.47 M/UL (ref 4.2–5.3)
SODIUM SERPL-SCNC: 139 MMOL/L (ref 135–145)
TROPONIN I SERPL-MCNC: <0.02 NG/ML (ref 0.02–0.05)
VENTRICULAR RATE, ECG03: 65 BPM
WBC # BLD AUTO: 12.3 K/UL (ref 4.6–13.2)

## 2021-06-18 PROCEDURE — 74011250636 HC RX REV CODE- 250/636: Performed by: EMERGENCY MEDICINE

## 2021-06-18 PROCEDURE — 84484 ASSAY OF TROPONIN QUANT: CPT

## 2021-06-18 PROCEDURE — 93005 ELECTROCARDIOGRAM TRACING: CPT

## 2021-06-18 PROCEDURE — 80048 BASIC METABOLIC PNL TOTAL CA: CPT

## 2021-06-18 PROCEDURE — 99284 EMERGENCY DEPT VISIT MOD MDM: CPT

## 2021-06-18 PROCEDURE — 80076 HEPATIC FUNCTION PANEL: CPT

## 2021-06-18 PROCEDURE — 96374 THER/PROPH/DIAG INJ IV PUSH: CPT

## 2021-06-18 PROCEDURE — 83690 ASSAY OF LIPASE: CPT

## 2021-06-18 PROCEDURE — 85025 COMPLETE CBC W/AUTO DIFF WBC: CPT

## 2021-06-18 RX ORDER — ONDANSETRON 4 MG/1
4 TABLET, ORALLY DISINTEGRATING ORAL
Qty: 10 TABLET | Refills: 0 | Status: SHIPPED | OUTPATIENT
Start: 2021-06-18

## 2021-06-18 RX ORDER — GABAPENTIN 300 MG/1
300 CAPSULE ORAL
COMMUNITY

## 2021-06-18 RX ORDER — CODEINE PHOSPHATE AND GUAIFENESIN 10; 100 MG/5ML; MG/5ML
5 SOLUTION ORAL
COMMUNITY

## 2021-06-18 RX ORDER — OMEPRAZOLE 20 MG/1
40 CAPSULE, DELAYED RELEASE ORAL DAILY
Qty: 20 CAPSULE | Refills: 0 | Status: SHIPPED | OUTPATIENT
Start: 2021-06-18 | End: 2021-07-27 | Stop reason: ALTCHOICE

## 2021-06-18 RX ORDER — ONDANSETRON 2 MG/ML
4 INJECTION INTRAMUSCULAR; INTRAVENOUS
Status: COMPLETED | OUTPATIENT
Start: 2021-06-18 | End: 2021-06-18

## 2021-06-18 RX ORDER — MELOXICAM 15 MG/1
15 TABLET ORAL DAILY
COMMUNITY

## 2021-06-18 RX ORDER — GUAIFENESIN 100 MG/5ML
81 LIQUID (ML) ORAL DAILY
COMMUNITY

## 2021-06-18 RX ADMIN — SODIUM CHLORIDE 1000 ML: 9 INJECTION, SOLUTION INTRAVENOUS at 07:18

## 2021-06-18 RX ADMIN — ONDANSETRON 4 MG: 2 INJECTION INTRAMUSCULAR; INTRAVENOUS at 07:13

## 2021-06-18 NOTE — ED TRIAGE NOTES
Pt to ED with complaints of epigastric pain, vomiting, and diarrhea that started this morning around 1 am and has not improved. Pt reports history of gastric reflux, reports self administering two Prilosec this morning.

## 2021-06-18 NOTE — PROGRESS NOTES
Admission Medication Reconciliation:    Information obtained from:  patient    Comments/Recommendations: Reviewed PTA medications and patient's allergies. Reviewed, added gabapentin and meloxicam        Allergies:  Azithromycin and Erythromycin    Significant PMH/Disease States:   Past Medical History:   Diagnosis Date    Asthma     Breast cancer ([facility] [address]     age 41    C. difficile diarrhea     GERD (gastroesophageal reflux disease)     HTN (hypertension)     Menopause     Stool color black      Chief Complaint for this Admission:    Chief Complaint   Patient presents with    Vomiting    Diarrhea    Epigastric Pain     Prior to Admission Medications:   Prior to Admission Medications   Prescriptions Last Dose Informant Patient Reported? Taking? Cholecalciferol, Vitamin D3, 3,000 unit tab 6/17/2021 at Unknown time  Yes Yes   Sig: Take 2,000 Units by mouth daily. OMEPRAZOLE PO 6/18/2021 at Unknown time  Yes No   Sig: Take 40 mg by mouth daily. aspirin 81 mg chewable tablet 6/17/2021 at Unknown time Self Yes Yes   Sig: Take 81 mg by mouth daily. cetirizine (ZYRTEC) 10 mg tablet 6/17/2021 at Unknown time  Yes Yes   Sig: Take 10 mg by mouth daily. gabapentin (NEURONTIN) 300 mg capsule 6/17/2021 at Unknown time  Yes Yes   Sig: Take 300 mg by mouth nightly. guaiFENesin-codeine (ROBITUSSIN AC) 100-10 mg/5 mL solution 6/17/2021 at Unknown time  Yes Yes   Sig: Take 5 mL by mouth nightly as needed for Cough. hydroCHLOROthiazide (HYDRODIURIL) 12.5 mg tablet 6/17/2021 at Unknown time  Yes Yes   Sig: Take 12.5 mg by mouth daily. losartan (COZAAR) 50 mg tablet 6/17/2021 at Unknown time  Yes Yes   Sig: Take 50 mg by mouth daily. meloxicam (MOBIC) 15 mg tablet 6/14/2021  Yes Yes   Sig: Take 15 mg by mouth daily. omeprazole (PRILOSEC) 20 mg capsule   No Yes   Sig: Take 2 Capsules by mouth daily.       Facility-Administered Medications: None       CRISTEL Gardner

## 2021-06-18 NOTE — ED PROVIDER NOTES
EMERGENCY DEPARTMENT HISTORY AND PHYSICAL EXAM      Date: 6/18/2021  Patient Name: Martha Lino      History of Presenting Illness     Chief Complaint   Patient presents with    Vomiting    Diarrhea    Epigastric Pain       History Provided By: Patient    HPI: Lata Fajardo, 70 y.o. female with a past medical history significant hypertension, asthma and Breast cancer, Torsten Shook presents to the ED with cc of nausea vomiting diarrhea and abdomen pain. Patient states that she began with nausea and vomiting approximately 1 AM today. This was followed with spasming epigastric pain and diarrhea. She vomited multiple times. Also at least 3 also episodes of diarrhea. No fever or chest pain. She states that she felt better just prior to coming to ED but her  wanted her checked out. She states she did not want to come. At present she states that she feels much better. She thinks her symptoms are most likely from what she ate last night which was chicken strips from [employer]. There are no other complaints, changes, or physical findings at this time. PCP: Bobbi Novak MD    Current Outpatient Medications   Medication Sig Dispense Refill    aspirin 81 mg chewable tablet Take 81 mg by mouth daily.  guaiFENesin-codeine (ROBITUSSIN AC) 100-10 mg/5 mL solution Take 5 mL by mouth nightly as needed for Cough.  ondansetron (Zofran ODT) 4 mg disintegrating tablet 1 Tablet by SubLINGual route every eight (8) hours as needed for Nausea or Vomiting. 10 Tablet 0    omeprazole (PRILOSEC) 20 mg capsule Take 2 Capsules by mouth daily. 20 Capsule 0    gabapentin (NEURONTIN) 300 mg capsule Take 300 mg by mouth nightly.  meloxicam (MOBIC) 15 mg tablet Take 15 mg by mouth daily.  losartan (COZAAR) 50 mg tablet Take 50 mg by mouth daily.  hydroCHLOROthiazide (HYDRODIURIL) 12.5 mg tablet Take 12.5 mg by mouth daily.  cetirizine (ZYRTEC) 10 mg tablet Take 10 mg by mouth daily.  Cholecalciferol, Vitamin D3, 3,000 unit tab Take 2,000 Units by mouth daily. Past History     Past Medical History:  Past Medical History:   Diagnosis Date    Asthma     Breast cancer ([address]     age 41    C. difficile diarrhea     GERD (gastroesophageal reflux disease)     HTN (hypertension)     Menopause     Stool color black        Past Surgical History:  Past Surgical History:   Procedure Laterality Date    HX BREAST BIOPSY      HX COLONOSCOPY  2010    HX MASTECTOMY Right     age 41       Family History:  Family History   Problem Relation Age of Onset   [address] [facility] [address] Breast Cancer Mother     Heart Disease Father     Breast Cancer Sister     Breast Cancer Maternal Aunt     Breast Cancer Niece     Breast Cancer Maternal Aunt        Social History:  Social History     Tobacco Use    Smoking status: Former Smoker    Smokeless tobacco: Never Used   Substance Use Topics    Alcohol use: Never    Drug use: Not on file       Allergies: Allergies   Allergen Reactions    Azithromycin Diarrhea    Erythromycin Diarrhea     GI distress         Review of Systems     Review of Systems   Constitutional: Negative for chills and fever. HENT: Negative for congestion and sore throat. Respiratory: Negative for cough and shortness of breath. Cardiovascular: Negative for chest pain. Gastrointestinal: Positive for abdominal pain, diarrhea, nausea and vomiting. Negative for abdominal distention. Genitourinary: Negative for difficulty urinating, dysuria, flank pain, frequency, hematuria, urgency, vaginal bleeding and vaginal discharge. Musculoskeletal: Negative for arthralgias and joint swelling. Skin: Negative for rash and wound. Neurological: Negative for dizziness, weakness, light-headedness and headaches. Hematological: Negative for adenopathy. Physical Exam     Physical Exam  Vitals and nursing note reviewed. Constitutional:       General: She is not in acute distress.      Appearance: She is well-developed. She is obese. She is not diaphoretic. HENT:      Head: Normocephalic and atraumatic. Jaw: No trismus. Right Ear: External ear normal. No swelling or tenderness. Tympanic membrane is not perforated, erythematous or bulging. Left Ear: External ear normal. No swelling or tenderness. Tympanic membrane is not perforated, erythematous or bulging. Nose: Nose normal. No mucosal edema or rhinorrhea. Right Sinus: No maxillary sinus tenderness or frontal sinus tenderness. Left Sinus: No maxillary sinus tenderness or frontal sinus tenderness. Mouth/Throat:      Mouth: No oral lesions. Dentition: No dental abscesses. Pharynx: Uvula midline. No oropharyngeal exudate, posterior oropharyngeal erythema or uvula swelling. Tonsils: No tonsillar abscesses. Eyes:      General: No scleral icterus. Right eye: No discharge. Left eye: No discharge. Conjunctiva/sclera: Conjunctivae normal.   Cardiovascular:      Rate and Rhythm: Normal rate and regular rhythm. Heart sounds: Normal heart sounds. No murmur heard. No friction rub. No gallop. Pulmonary:      Effort: Pulmonary effort is normal. No tachypnea, accessory muscle usage or respiratory distress. Breath sounds: Normal breath sounds. No decreased breath sounds, wheezing, rhonchi or rales. Comments: S/p right mastectomy  Abdominal:      Palpations: Abdomen is soft. Musculoskeletal:         General: No tenderness. Normal range of motion. Cervical back: Normal range of motion and neck supple. Lymphadenopathy:      Cervical: No cervical adenopathy. Skin:     General: Skin is warm and dry. Findings: No erythema or rash. Neurological:      Mental Status: She is alert and oriented to person, place, and time.    Psychiatric:         Judgment: Judgment normal.         Lab and Diagnostic Study Results     Labs -   No results found for this or any previous visit (from the past 12 hour(s)). Radiologic Studies -   [unfilled]  CT Results  (Last 48 hours)    None        CXR Results  (Last 48 hours)    None          Medical Decision Making and ED Course   - I am the first and primary provider for this patient AND AM THE PRIMARY PROVIDER OF RECORD. - I reviewed the vital signs, available nursing notes, past medical history, past surgical history, family history and social history. - Initial assessment performed. The patients presenting problems have been discussed, and the staff are in agreement with the care plan formulated and outlined with them. I have encouraged them to ask questions as they arise throughout their visit. Vital Signs-Reviewed the patient's vital signs. No data found. EKG interpretation: (Preliminary): Performed at 06:52, and read at 07:05  Rhythm: normal sinus rhythm; and regular . Rate (approx.): 65  ; Axis: normal; WA interval: normal; QRS interval: normal ; ST/T wave: normal; Other findings: .      Records Reviewed: Nursing Notes and Old Medical Records    The patient presents with abdominal pain with a differential diagnosis of biliary colic, cholecystitis, gastritis, gastroenteritis, GERD, pancreatitis and UTI    ED Course:              Provider Notes (Medical Decision Making):               Consultations:       Consultations:         Procedures and Critical Care                     Disposition     Disposition: Condition stable and improved    Diagnosis:   1.  Abdominal pain, vomiting, and diarrhea          Disposition:     Follow-up Information     Follow up With Specialties Details Why Contact Khadra Novak MD Internal Medicine In 3 days  [address] [phone]      [facility] EMERGENCY DEPT Emergency Medicine  If symptoms worsen [place] [id number]  [phone]          Discharge Medication List as of 6/18/2021  9:16 AM      START taking these medications    Details ondansetron (Zofran ODT) 4 mg disintegrating tablet 1 Tablet by SubLINGual route every eight (8) hours as needed for Nausea or Vomiting., Normal, Disp-10 Tablet, R-0         CONTINUE these medications which have CHANGED    Details   omeprazole (PRILOSEC) 20 mg capsule Take 2 Capsules by mouth daily. , Normal, Disp-20 Capsule, R-0         CONTINUE these medications which have NOT CHANGED    Details   aspirin 81 mg chewable tablet Take 81 mg by mouth daily. , Historical Med      guaiFENesin-codeine (ROBITUSSIN AC) 100-10 mg/5 mL solution Take 5 mL by mouth nightly as needed for Cough., Historical Med      gabapentin (NEURONTIN) 300 mg capsule Take 300 mg by mouth nightly., Historical Med      meloxicam (MOBIC) 15 mg tablet Take 15 mg by mouth daily. , Historical Med      losartan (COZAAR) 50 mg tablet Take 50 mg by mouth daily. , Historical Med      hydroCHLOROthiazide (HYDRODIURIL) 12.5 mg tablet Take 12.5 mg by mouth daily. , Historical Med      cetirizine (ZYRTEC) 10 mg tablet Take 10 mg by mouth daily. , Historical Med      Cholecalciferol, Vitamin D3, 3,000 unit tab Take 2,000 Units by mouth daily. , Historical Med             emove if not discharged  DISCHARGE PLAN:  1. Current Discharge Medication List      CONTINUE these medications which have NOT CHANGED    Details   aspirin 81 mg chewable tablet Take 81 mg by mouth daily. guaiFENesin-codeine (ROBITUSSIN AC) 100-10 mg/5 mL solution Take 5 mL by mouth three (3) times daily as needed for Cough. losartan (COZAAR) 50 mg tablet Take 50 mg by mouth daily. hydroCHLOROthiazide (HYDRODIURIL) 12.5 mg tablet Take 12.5 mg by mouth daily. cetirizine (ZYRTEC) 10 mg tablet Take 10 mg by mouth daily. OMEPRAZOLE PO Take 40 mg by mouth daily. Cholecalciferol, Vitamin D3, 3,000 unit tab Take 2,000 Units by mouth daily. BUDESONIDE/FORMOTEROL FUMARATE (SYMBICORT IN) Take  by inhalation.              2.   Follow-up Information     Follow up With Specialties Details Why Contact Info    Sarah Miller MD Internal Medicine In 3 days  [place] [phone]      [facility] EMERGENCY DEPT Emergency Medicine  If symptoms worsen [place]  [id number]  [phone]        3. Return to ED if worse   4. Discharge Medication List as of 6/18/2021  9:16 AM      START taking these medications    Details   ondansetron (Zofran ODT) 4 mg disintegrating tablet 1 Tablet by SubLINGual route every eight (8) hours as needed for Nausea or Vomiting., Normal, Disp-10 Tablet, R-0         CONTINUE these medications which have CHANGED    Details   omeprazole (PRILOSEC) 20 mg capsule Take 2 Capsules by mouth daily. , Normal, Disp-20 Capsule, R-0         CONTINUE these medications which have NOT CHANGED    Details   aspirin 81 mg chewable tablet Take 81 mg by mouth daily. , Historical Med      guaiFENesin-codeine (ROBITUSSIN AC) 100-10 mg/5 mL solution Take 5 mL by mouth nightly as needed for Cough., Historical Med      gabapentin (NEURONTIN) 300 mg capsule Take 300 mg by mouth nightly., Historical Med      meloxicam (MOBIC) 15 mg tablet Take 15 mg by mouth daily. , Historical Med      losartan (COZAAR) 50 mg tablet Take 50 mg by mouth daily. , Historical Med      hydroCHLOROthiazide (HYDRODIURIL) 12.5 mg tablet Take 12.5 mg by mouth daily. , Historical Med      cetirizine (ZYRTEC) 10 mg tablet Take 10 mg by mouth daily. , Historical Med      Cholecalciferol, Vitamin D3, 3,000 unit tab Take 2,000 Units by mouth daily. , Historical Med             Diagnosis     Clinical Impression:   1. Abdominal pain, vomiting, and diarrhea        Attestations:    Binh Greene MD    Please note that this dictation was completed with Snap[employer], the [employer] voice recognition software. Quite often unanticipated grammatical, syntax, homophones, and other interpretive errors are inadvertently transcribed by the computer software.   Please disregard these errors. Please excuse any errors that have escaped final proofreading. Thank you.

## 2021-06-29 ENCOUNTER — OFFICE VISIT (OUTPATIENT)
Dept: ORTHOPEDIC SURGERY | Age: 76
End: 2021-06-29
Payer: MEDICARE

## 2021-06-29 DIAGNOSIS — M25.561 RIGHT KNEE PAIN, UNSPECIFIED CHRONICITY: Primary | ICD-10-CM

## 2021-06-29 DIAGNOSIS — M17.11 OSTEOARTHRITIS OF RIGHT KNEE, UNSPECIFIED OSTEOARTHRITIS TYPE: ICD-10-CM

## 2021-06-29 PROCEDURE — 20611 DRAIN/INJ JOINT/BURSA W/US: CPT | Performed by: ORTHOPAEDIC SURGERY

## 2021-06-29 PROCEDURE — 99214 OFFICE O/P EST MOD 30 MIN: CPT | Performed by: ORTHOPAEDIC SURGERY

## 2021-06-29 PROCEDURE — G9899 SCRN MAM PERF RSLTS DOC: HCPCS | Performed by: ORTHOPAEDIC SURGERY

## 2021-06-29 PROCEDURE — G8427 DOCREV CUR MEDS BY ELIG CLIN: HCPCS | Performed by: ORTHOPAEDIC SURGERY

## 2021-06-29 PROCEDURE — G8432 DEP SCR NOT DOC, RNG: HCPCS | Performed by: ORTHOPAEDIC SURGERY

## 2021-06-29 PROCEDURE — G8400 PT W/DXA NO RESULTS DOC: HCPCS | Performed by: ORTHOPAEDIC SURGERY

## 2021-06-29 PROCEDURE — 1101F PT FALLS ASSESS-DOCD LE1/YR: CPT | Performed by: ORTHOPAEDIC SURGERY

## 2021-06-29 PROCEDURE — 3017F COLORECTAL CA SCREEN DOC REV: CPT | Performed by: ORTHOPAEDIC SURGERY

## 2021-06-29 PROCEDURE — G8417 CALC BMI ABV UP PARAM F/U: HCPCS | Performed by: ORTHOPAEDIC SURGERY

## 2021-06-29 PROCEDURE — 1090F PRES/ABSN URINE INCON ASSESS: CPT | Performed by: ORTHOPAEDIC SURGERY

## 2021-06-29 PROCEDURE — G8536 NO DOC ELDER MAL SCRN: HCPCS | Performed by: ORTHOPAEDIC SURGERY

## 2021-06-29 RX ORDER — HYDROCODONE POLISTIREX AND CHLORPHENIRAMINE POLISTIREX 10; 8 MG/5ML; MG/5ML
SUSPENSION, EXTENDED RELEASE ORAL
COMMUNITY
Start: 2021-04-26

## 2021-06-29 RX ORDER — LIDOCAINE HYDROCHLORIDE 10 MG/ML
9 INJECTION INFILTRATION; PERINEURAL ONCE
Status: COMPLETED | OUTPATIENT
Start: 2021-06-29 | End: 2021-06-29

## 2021-06-29 RX ORDER — TRIAMCINOLONE ACETONIDE 40 MG/ML
40 INJECTION, SUSPENSION INTRA-ARTICULAR; INTRAMUSCULAR ONCE
Status: COMPLETED | OUTPATIENT
Start: 2021-06-29 | End: 2021-06-29

## 2021-06-29 RX ORDER — HYDROCHLOROTHIAZIDE 12.5 MG/1
CAPSULE ORAL
COMMUNITY
Start: 2021-04-18

## 2021-06-29 RX ADMIN — LIDOCAINE HYDROCHLORIDE 9 ML: 10 INJECTION INFILTRATION; PERINEURAL at 09:50

## 2021-06-29 RX ADMIN — TRIAMCINOLONE ACETONIDE 40 MG: 40 INJECTION, SUSPENSION INTRA-ARTICULAR; INTRAMUSCULAR at 09:50

## 2021-06-29 NOTE — LETTER
Gianni Mary   1945   [id number]       6/29/2021       I hereby authorize and direct Nino Pringle MD, Grace Lyle, and whomever he may designate as his associate to perform upon myself the following procedure:    Injection of: Kenalog, Supartz, Euflexxa, Orthovisc in the Right/Left ____________________. If any unforeseen condition arises in the course of the procedure, I further authorize him and his associated and/or assistant(s) to do whatever he/she deems advisable. The nature, purpose, benefits, risks, side effects, likelihood of achieving goals, and potential problems that might occur during recuperation, risks for not receiving the proposed care, treatment and services and alternatives of the procedure have been fully explained to me by my physician including, but not limited to:    Swelling, joint pain, skin pigment changes, worsening of condition, and failure to improve. I acknowledge that no guarantee or assurance has been made to me as to the results that may be obtained or the likelihood of success.                 _______________________________________     Signature of patient or authorized representative                [employer] and Sports Medicine fax: [phone]

## 2021-06-29 NOTE — PATIENT INSTRUCTIONS
Knee Pain or Injury: Care Instructions  Your Care Instructions     Injuries are a common cause of knee problems. Sudden (acute) injuries may be caused by a direct blow to the knee. They can also be caused by abnormal twisting, bending, or falling on the knee. Pain, bruising, or swelling may be severe, and may start within minutes of the injury. Overuse is another cause of knee pain. Other causes are climbing stairs, kneeling, and other activities that use the knee. Everyday wear and tear, especially as you get older, also can cause knee pain. Rest, along with home treatment, often relieves pain and allows your knee to heal. If you have a serious knee injury, you may need tests and treatment. Follow-up care is a key part of your treatment and safety. Be sure to make and go to all appointments, and call your doctor if you are having problems. It's also a good idea to know your test results and keep a list of the medicines you take. How can you care for yourself at home? · Be safe with medicines. Read and follow all instructions on the label. ? If the doctor gave you a prescription medicine for pain, take it as prescribed. ? If you are not taking a prescription pain medicine, ask your doctor if you can take an over-the-counter medicine. · Rest and protect your knee. Take a break from any activity that may cause pain. · Put ice or a cold pack on your knee for 10 to 20 minutes at a time. Put a thin cloth between the ice and your skin. · Prop up a sore knee on a pillow when you ice it or anytime you sit or lie down for the next 3 days. Try to keep it above the level of your heart. This will help reduce swelling. · If your knee is not swollen, you can put moist heat, a heating pad, or a warm cloth on your knee. · If your doctor recommends an elastic bandage, sleeve, or other type of support for your knee, wear it as directed.   · Follow your doctor's instructions about how much weight you can put on your leg. Use a cane, crutches, or a walker as instructed. · Follow your doctor's instructions about activity during your healing process. If you can do mild exercise, slowly increase your activity. · Reach and stay at a healthy weight. Extra weight can strain the joints, especially the knees and hips, and make the pain worse. Losing even a few pounds may help. When should you call for help? Call 911 anytime you think you may need emergency care. For example, call if:    · You have symptoms of a blood clot in your lung (called a pulmonary embolism). These may include:  ? Sudden chest pain. ? Trouble breathing. ? Coughing up blood. Call your doctor now or seek immediate medical care if:    · You have severe or increasing pain.     · Your leg or foot turns cold or changes color.     · You cannot stand or put weight on your knee.     · Your knee looks twisted or bent out of shape.     · You cannot move your knee.     · You have signs of infection, such as:  ? Increased pain, swelling, warmth, or redness. ? Red streaks leading from the knee. ? Pus draining from a place on your knee. ? A fever.     · You have signs of a blood clot in your leg (called a deep vein thrombosis), such as:  ? Pain in your calf, back of the knee, thigh, or groin. ? Redness and swelling in your leg or groin. Watch closely for changes in your health, and be sure to contact your doctor if:    · You have tingling, weakness, or numbness in your knee.     · You have any new symptoms, such as swelling.     · You have bruises from a knee injury that last longer than 2 weeks.     · You do not get better as expected. Where can you learn more? Go to http://www.gray.com/  Enter K195 in the search box to learn more about \"Knee Pain or Injury: Care Instructions. \"  Current as of: February 26, 2020               Content Version: 12.8  © 3150-6398 [employer].    Care instructions adapted under license by [employer] (which disclaims liability or warranty for this information). If you have questions about a medical condition or this instruction, always ask your healthcare professional. [address] any warranty or liability for your use of this information.

## 2021-06-29 NOTE — PROGRESS NOTES
Name: Fahad Schofield    : 1945     Service Dept: [facility] and Sports Medicine    Patient's Pharmacies:    [address], [address] [place], [place] [address]way  [address] [place] [address]  [phone]  Phone: [phone] Fax: [phone]       Chief Complaint   Patient presents with    Knee Pain        There were no vitals taken for this visit. Allergies   Allergen Reactions    Azithromycin Diarrhea    Erythromycin Diarrhea     GI distress      Current Outpatient Medications   Medication Sig Dispense Refill    HYDROcodone-chlorpheniramine (TUSSIONEX) 10-8 mg/5 mL suspension SHAKE LIQUID AND TAKE 5 ML BY MOUTH EVERY 12 HOURS AS NEEDED FOR COUGH      hydroCHLOROthiazide (MICROZIDE) 12.5 mg capsule       aspirin 81 mg chewable tablet Take 81 mg by mouth daily.  guaiFENesin-codeine (ROBITUSSIN AC) 100-10 mg/5 mL solution Take 5 mL by mouth nightly as needed for Cough.  ondansetron (Zofran ODT) 4 mg disintegrating tablet 1 Tablet by SubLINGual route every eight (8) hours as needed for Nausea or Vomiting. 10 Tablet 0    omeprazole (PRILOSEC) 20 mg capsule Take 2 Capsules by mouth daily. 20 Capsule 0    gabapentin (NEURONTIN) 300 mg capsule Take 300 mg by mouth nightly.  meloxicam (MOBIC) 15 mg tablet Take 15 mg by mouth daily.  losartan (COZAAR) 50 mg tablet Take 50 mg by mouth daily.  hydroCHLOROthiazide (HYDRODIURIL) 12.5 mg tablet Take 12.5 mg by mouth daily.  cetirizine (ZYRTEC) 10 mg tablet Take 10 mg by mouth daily.  Cholecalciferol, Vitamin D3, 3,000 unit tab Take 2,000 Units by mouth daily.         Patient Active Problem List   Diagnosis Code    Internal and external hemorrhoids without complication W74.5, M95.3      Family History   Problem Relation Age of Onset    Breast Cancer Mother     Heart Disease Father     Breast Cancer Sister     Breast Cancer Maternal Aunt     Breast Cancer Niece     Breast Cancer Maternal Aunt       Social History     Socioeconomic History    Marital status:      Spouse name: Not on file    Number of children: Not on file    Years of education: Not on file    Highest education level: Not on file   Tobacco Use    Smoking status: Former Smoker    Smokeless tobacco: Never Used   Substance and Sexual Activity    Alcohol use: Never     Social Determinants of Health     Financial Resource Strain:     Difficulty of Paying Living Expenses:    Food Insecurity:     Worried About Running Out of Food in the Last Year:     [address] in the Last Year:    Transportation Needs:     Lack of Transportation (Medical):  Lack of Transportation (Non-Medical):    Physical Activity:     Days of Exercise per Week:     Minutes of Exercise per Session:    Stress:     Feeling of Stress :    Social Connections:     Frequency of Communication with Friends and Family:     Frequency of Social Gatherings with Friends and Family:     Attends [employer] Services:     Active Member of Clubs or Organizations:     Attends Club or Organization Meetings:     Marital Status:       Past Surgical History:   Procedure Laterality Date    HX BREAST BIOPSY      HX COLONOSCOPY  2010    HX MASTECTOMY Right     age 41      Past Medical History:   Diagnosis Date    Asthma     [facility]     age 41   Alexey Monaco C. difficile diarrhea     GERD (gastroesophageal reflux disease)     HTN (hypertension)     Menopause     Stool color black         I have reviewed and agree with [address] [place] [address] Nw and ROS and intake form in chart and the record furthermore I have reviewed prior medical record(s) regarding this patients care during this appointment. Review of Systems:   Patient is a pleasant appearing individual, appropriately dressed, well hydrated, well nourished, who is alert, appropriately oriented for age, and in no acute distress with a normal gait and normal affect who does not appear to be in any significant pain. Physical Exam:  Right Knee -Decrease range of motion with flexion, Knee arc of greater than 50 degrees, Some crepitation, Grossly neurovascularly intact, Good cap refill, No skin lesion, Moderate swelling, No gross instability, Some quadriceps weakness, greater than 50 degree arc    Left Knee - Full Range of Motion, No crepitation, Grossly neurovascularly intact, Good cap refill, No skin lesion, No swelling, No gross instability, No quadriceps weakness    Procedure Documentation:    I discussed in detail the risks, benefits and complications of an injection which included but are not limited to infection, skin reactions, hot swollen joint, and anaphylaxis with the patient. The patient verbalized understanding and gave informed consent for the injection. The patient's knee was flexed to 90° and the skin prepped using sterile alcohol solution. A sterile needle was inserted into the right knee and the mixture of 9 mL Lidocaine 1%, 1 mL Kenalog 40 mg was injected under sterile technique. The needle was withdrawn and the puncture site sealed with a Band-Aid. Technique: Under sterile conditions a [employer] ultrasound unit with a variable frequency (7.0-14.0 MHz) linear transducer was used to localize the placement of needle into the right knee joint. Findings: Successful needle placement for knee injection. Final images were taken and saved for permanent record. The patient tolerated the injection well. The patient was instructed to call the office immediately if there is any pain, redness, warmth, fever, or chills. Encounter Diagnoses     ICD-10-CM ICD-9-CM   1. Right knee pain, unspecified chronicity  M25.561 719.46   2. Osteoarthritis of right knee, unspecified osteoarthritis type  M17.11 715.96       HPI:  The patient is here with a chief complaint of right knee pain, sharp throbbing pain. Pain is 7/10. X-rays are positive for moderate OA of the right knee.     Assessment/Plan:  Plan would be for right knee injection for arthritic flare. If it helps, it is all we need to do. If it does not, we may consider treatment options and go from there. As part of continued conservative pain management options the patient was advised to utilize Tylenol or OTC NSAIDS as long as it is not medically contraindicated. Return to Office: Follow-up and Dispositions    · Return in about 2 weeks (around 7/13/2021). Administrations This Visit     lidocaine (XYLOCAINE) 10 mg/mL (1 %) injection 9 mL     Admin Date  06/29/2021 Action  Given Dose  9 mL Route  Other Administered By  Deejay Rubin LPN          triamcinolone acetonide (KENALOG-40) 40 mg/mL injection 40 mg     Admin Date  06/29/2021 Action  Given Dose  40 mg Route  Intra artICUlar Administered By  Deejay Rubin LPN               Scribed by Salty Mckeon LPN as dictated by [facility] MOUNIKA Lugo MD.  Documentation True and Accepted [facility] MOUNIKA Lugo MD

## 2021-07-27 ENCOUNTER — OFFICE VISIT (OUTPATIENT)
Dept: ORTHOPEDIC SURGERY | Age: 76
End: 2021-07-27
Payer: MEDICARE

## 2021-07-27 DIAGNOSIS — M17.11 PRIMARY OSTEOARTHRITIS OF RIGHT KNEE: Primary | ICD-10-CM

## 2021-07-27 PROCEDURE — 1090F PRES/ABSN URINE INCON ASSESS: CPT | Performed by: ORTHOPAEDIC SURGERY

## 2021-07-27 PROCEDURE — G9899 SCRN MAM PERF RSLTS DOC: HCPCS | Performed by: ORTHOPAEDIC SURGERY

## 2021-07-27 PROCEDURE — G8432 DEP SCR NOT DOC, RNG: HCPCS | Performed by: ORTHOPAEDIC SURGERY

## 2021-07-27 PROCEDURE — 99214 OFFICE O/P EST MOD 30 MIN: CPT | Performed by: ORTHOPAEDIC SURGERY

## 2021-07-27 PROCEDURE — 1101F PT FALLS ASSESS-DOCD LE1/YR: CPT | Performed by: ORTHOPAEDIC SURGERY

## 2021-07-27 PROCEDURE — G8536 NO DOC ELDER MAL SCRN: HCPCS | Performed by: ORTHOPAEDIC SURGERY

## 2021-07-27 PROCEDURE — G8400 PT W/DXA NO RESULTS DOC: HCPCS | Performed by: ORTHOPAEDIC SURGERY

## 2021-07-27 PROCEDURE — G8427 DOCREV CUR MEDS BY ELIG CLIN: HCPCS | Performed by: ORTHOPAEDIC SURGERY

## 2021-07-27 PROCEDURE — 3017F COLORECTAL CA SCREEN DOC REV: CPT | Performed by: ORTHOPAEDIC SURGERY

## 2021-07-27 PROCEDURE — G8417 CALC BMI ABV UP PARAM F/U: HCPCS | Performed by: ORTHOPAEDIC SURGERY

## 2021-07-27 RX ORDER — OMEPRAZOLE 40 MG/1
CAPSULE, DELAYED RELEASE ORAL
COMMUNITY
Start: 2021-07-08

## 2021-07-27 RX ORDER — FAMOTIDINE 20 MG/1
TABLET, FILM COATED ORAL
COMMUNITY
Start: 2021-07-08

## 2021-07-27 NOTE — PROGRESS NOTES
Name: Rock Branham    : 1945     Service Dept: 414 [facility]    Patient's Pharmacies:    [address] , [address] [place], [place] [address]way  [address] [place] [address]  [facility] [phone]  Phone: [phone] Fax: [phone]       Chief Complaint   Patient presents with    Knee Pain        There were no vitals taken for this visit. Allergies   Allergen Reactions    Azithromycin Diarrhea    Erythromycin Diarrhea     GI distress      Current Outpatient Medications   Medication Sig Dispense Refill    famotidine (PEPCID) 20 mg tablet       omeprazole (PRILOSEC) 40 mg capsule TAKE 1 CAPSULE BY MOUTH EVERY MORNING AT BREAKFAST      HYDROcodone-chlorpheniramine (TUSSIONEX) 10-8 mg/5 mL suspension SHAKE LIQUID AND TAKE 5 ML BY MOUTH EVERY 12 HOURS AS NEEDED FOR COUGH      hydroCHLOROthiazide (MICROZIDE) 12.5 mg capsule       aspirin 81 mg chewable tablet Take 81 mg by mouth daily.  guaiFENesin-codeine (ROBITUSSIN AC) 100-10 mg/5 mL solution Take 5 mL by mouth nightly as needed for Cough.  ondansetron (Zofran ODT) 4 mg disintegrating tablet 1 Tablet by SubLINGual route every eight (8) hours as needed for Nausea or Vomiting. 10 Tablet 0    gabapentin (NEURONTIN) 300 mg capsule Take 300 mg by mouth nightly.  meloxicam (MOBIC) 15 mg tablet Take 15 mg by mouth daily.  losartan (COZAAR) 50 mg tablet Take 50 mg by mouth daily.  hydroCHLOROthiazide (HYDRODIURIL) 12.5 mg tablet Take 12.5 mg by mouth daily.  cetirizine (ZYRTEC) 10 mg tablet Take 10 mg by mouth daily.  Cholecalciferol, Vitamin D3, 3,000 unit tab Take 2,000 Units by mouth daily.         Patient Active Problem List   Diagnosis Code    Internal and external hemorrhoids without complication C93.8, D97.4      Family History   Problem Relation Age of Onset    Breast Cancer Mother     Heart Disease Father     Breast Cancer Sister     Breast Cancer Maternal Aunt     Breast Cancer Niece     Breast Cancer Maternal Aunt       Social History     Socioeconomic History    Marital status:      Spouse name: Not on file    Number of children: Not on file    Years of education: Not on file    Highest education level: Not on file   Tobacco Use    Smoking status: Former Smoker    Smokeless tobacco: Never Used   Substance and Sexual Activity    Alcohol use: Never     Social Determinants of Health     Financial Resource Strain:     Difficulty of Paying Living Expenses:    Food Insecurity:     Worried About Running Out of Food in the Last Year:     [address] in the Last Year:    Transportation Needs:     Lack of Transportation (Medical):  Lack of Transportation (Non-Medical):    Physical Activity:     Days of Exercise per Week:     Minutes of Exercise per Session:    Stress:     Feeling of Stress :    Social Connections:     Frequency of Communication with Friends and Family:     Frequency of Social Gatherings with Friends and Family:     Attends Oriental orthodox Services:     Active Member of Clubs or Organizations:     Attends Club or Organization Meetings:     Marital Status:       Past Surgical History:   Procedure Laterality Date    HX BREAST BIOPSY      HX COLONOSCOPY  2010    HX MASTECTOMY Right     age 41      Past Medical History:   Diagnosis Date    Asthma     Breast cancer ([facility] [address]     age 41   Magali Brunner C. difficile diarrhea     GERD (gastroesophageal reflux disease)     HTN (hypertension)     Menopause     Stool color black         I have reviewed and agree with [address] [place] [address] Nw and ROS and intake form in chart and the record furthermore I have reviewed prior medical record(s) regarding this patients care during this appointment.      Review of Systems:   Patient is a pleasant appearing individual, appropriately dressed, well hydrated, well nourished, who is alert, appropriately oriented for age, and in no acute distress with a normal gait and normal affect who does not appear to be in any significant pain. Physical Exam:  Right Knee -Decrease range of motion with flexion, Knee arc of greater than 50 degrees, Some crepitation, Grossly neurovascularly intact, Good cap refill, No skin lesion, Moderate swelling, No gross instability, Some quadriceps weakness, greater than 50 degree arc    Left Knee - Full Range of Motion, No crepitation, Grossly neurovascularly intact, Good cap refill, No skin lesion, No swelling, No gross instability, No quadriceps weakness     Encounter Diagnoses     ICD-10-CM ICD-9-CM   1. Primary osteoarthritis of right knee  M17.11 715.16       HPI:  The patient is here with a chief complaint of right knee pain, sharp, throbbing pain, progressively getting worse. Pain is 7/10. X-rays of the right knee are positive for mild-to-moderate OA. Post cortisone injection, no relief. Continues to have difficulty. Assessment/Plan:  Plan at this point will be for right knee MRI. I will see her back post MRI to assess arthritis and go from there. As part of continued conservative pain management options the patient was advised to utilize Tylenol or OTC NSAIDS as long as it is not medically contraindicated. Return to Office: Follow-up and Dispositions    · Return for POST MRI. Scribed by Sivan Powers as dictated by [facility] Jacob Fofana MD.  Documentation True and Accepted Nino Fofana MD

## 2021-07-27 NOTE — PATIENT INSTRUCTIONS
Knee Pain or Injury: Care Instructions  Your Care Instructions     Injuries are a common cause of knee problems. Sudden (acute) injuries may be caused by a direct blow to the knee. They can also be caused by abnormal twisting, bending, or falling on the knee. Pain, bruising, or swelling may be severe, and may start within minutes of the injury. Overuse is another cause of knee pain. Other causes are climbing stairs, kneeling, and other activities that use the knee. Everyday wear and tear, especially as you get older, also can cause knee pain. Rest, along with home treatment, often relieves pain and allows your knee to heal. If you have a serious knee injury, you may need tests and treatment. Follow-up care is a key part of your treatment and safety. Be sure to make and go to all appointments, and call your doctor if you are having problems. It's also a good idea to know your test results and keep a list of the medicines you take. How can you care for yourself at home? · Be safe with medicines. Read and follow all instructions on the label. ? If the doctor gave you a prescription medicine for pain, take it as prescribed. ? If you are not taking a prescription pain medicine, ask your doctor if you can take an over-the-counter medicine. · Rest and protect your knee. Take a break from any activity that may cause pain. · Put ice or a cold pack on your knee for 10 to 20 minutes at a time. Put a thin cloth between the ice and your skin. · Prop up a sore knee on a pillow when you ice it or anytime you sit or lie down for the next 3 days. Try to keep it above the level of your heart. This will help reduce swelling. · If your knee is not swollen, you can put moist heat, a heating pad, or a warm cloth on your knee. · If your doctor recommends an elastic bandage, sleeve, or other type of support for your knee, wear it as directed.   · Follow your doctor's instructions about how much weight you can put on your leg. Use a cane, crutches, or a walker as instructed. · Follow your doctor's instructions about activity during your healing process. If you can do mild exercise, slowly increase your activity. · Reach and stay at a healthy weight. Extra weight can strain the joints, especially the knees and hips, and make the pain worse. Losing even a few pounds may help. When should you call for help? Call 911 anytime you think you may need emergency care. For example, call if:    · You have symptoms of a blood clot in your lung (called a pulmonary embolism). These may include:  ? Sudden chest pain. ? Trouble breathing. ? Coughing up blood. Call your doctor now or seek immediate medical care if:    · You have severe or increasing pain.     · Your leg or foot turns cold or changes color.     · You cannot stand or put weight on your knee.     · Your knee looks twisted or bent out of shape.     · You cannot move your knee.     · You have signs of infection, such as:  ? Increased pain, swelling, warmth, or redness. ? Red streaks leading from the knee. ? Pus draining from a place on your knee. ? A fever.     · You have signs of a blood clot in your leg (called a deep vein thrombosis), such as:  ? Pain in your calf, back of the knee, thigh, or groin. ? Redness and swelling in your leg or groin. Watch closely for changes in your health, and be sure to contact your doctor if:    · You have tingling, weakness, or numbness in your knee.     · You have any new symptoms, such as swelling.     · You have bruises from a knee injury that last longer than 2 weeks.     · You do not get better as expected. Where can you learn more? Go to http://www.gray.com/  Enter K195 in the search box to learn more about \"Knee Pain or Injury: Care Instructions. \"  Current as of: February 26, 2020               Content Version: 12.8  © 5419-0430 [employer].    Care instructions adapted under license by [employer] (which disclaims liability or warranty for this information). If you have questions about a medical condition or this instruction, always ask your healthcare professional. [address] any warranty or liability for your use of this information.

## 2021-07-30 ENCOUNTER — HOSPITAL ENCOUNTER (OUTPATIENT)
Dept: MRI IMAGING | Age: 76
Discharge: HOME | End: 2021-07-30
Attending: ORTHOPAEDIC SURGERY
Payer: MEDICARE

## 2021-07-30 DIAGNOSIS — M17.11 PRIMARY OSTEOARTHRITIS OF RIGHT KNEE: ICD-10-CM

## 2021-07-30 PROCEDURE — 73721 MRI JNT OF LWR EXTRE W/O DYE: CPT

## 2021-08-03 ENCOUNTER — OFFICE VISIT (OUTPATIENT)
Dept: ORTHOPEDIC SURGERY | Age: 76
End: 2021-08-03
Payer: MEDICARE

## 2021-08-03 DIAGNOSIS — M17.11 OSTEOARTHRITIS OF RIGHT KNEE, UNSPECIFIED OSTEOARTHRITIS TYPE: ICD-10-CM

## 2021-08-03 DIAGNOSIS — M25.561 RIGHT KNEE PAIN, UNSPECIFIED CHRONICITY: Primary | ICD-10-CM

## 2021-08-03 PROCEDURE — 3017F COLORECTAL CA SCREEN DOC REV: CPT | Performed by: ORTHOPAEDIC SURGERY

## 2021-08-03 PROCEDURE — 20611 DRAIN/INJ JOINT/BURSA W/US: CPT | Performed by: ORTHOPAEDIC SURGERY

## 2021-08-03 PROCEDURE — 1101F PT FALLS ASSESS-DOCD LE1/YR: CPT | Performed by: ORTHOPAEDIC SURGERY

## 2021-08-03 PROCEDURE — G9899 SCRN MAM PERF RSLTS DOC: HCPCS | Performed by: ORTHOPAEDIC SURGERY

## 2021-08-03 PROCEDURE — 1090F PRES/ABSN URINE INCON ASSESS: CPT | Performed by: ORTHOPAEDIC SURGERY

## 2021-08-03 PROCEDURE — G8536 NO DOC ELDER MAL SCRN: HCPCS | Performed by: ORTHOPAEDIC SURGERY

## 2021-08-03 PROCEDURE — G8432 DEP SCR NOT DOC, RNG: HCPCS | Performed by: ORTHOPAEDIC SURGERY

## 2021-08-03 PROCEDURE — 99214 OFFICE O/P EST MOD 30 MIN: CPT | Performed by: ORTHOPAEDIC SURGERY

## 2021-08-03 PROCEDURE — G8400 PT W/DXA NO RESULTS DOC: HCPCS | Performed by: ORTHOPAEDIC SURGERY

## 2021-08-03 PROCEDURE — G8427 DOCREV CUR MEDS BY ELIG CLIN: HCPCS | Performed by: ORTHOPAEDIC SURGERY

## 2021-08-03 PROCEDURE — G8417 CALC BMI ABV UP PARAM F/U: HCPCS | Performed by: ORTHOPAEDIC SURGERY

## 2021-08-03 RX ORDER — LIDOCAINE HYDROCHLORIDE 10 MG/ML
9 INJECTION INFILTRATION; PERINEURAL ONCE
Status: COMPLETED | OUTPATIENT
Start: 2021-08-03 | End: 2021-08-03

## 2021-08-03 RX ORDER — TRIAMCINOLONE ACETONIDE 40 MG/ML
40 INJECTION, SUSPENSION INTRA-ARTICULAR; INTRAMUSCULAR ONCE
Status: COMPLETED | OUTPATIENT
Start: 2021-08-03 | End: 2021-08-03

## 2021-08-03 RX ADMIN — TRIAMCINOLONE ACETONIDE 40 MG: 40 INJECTION, SUSPENSION INTRA-ARTICULAR; INTRAMUSCULAR at 11:23

## 2021-08-03 RX ADMIN — LIDOCAINE HYDROCHLORIDE 9 ML: 10 INJECTION INFILTRATION; PERINEURAL at 11:23

## 2021-08-03 NOTE — PATIENT INSTRUCTIONS
Knee Pain or Injury: Care Instructions  Your Care Instructions     Injuries are a common cause of knee problems. Sudden (acute) injuries may be caused by a direct blow to the knee. They can also be caused by abnormal twisting, bending, or falling on the knee. Pain, bruising, or swelling may be severe, and may start within minutes of the injury. Overuse is another cause of knee pain. Other causes are climbing stairs, kneeling, and other activities that use the knee. Everyday wear and tear, especially as you get older, also can cause knee pain. Rest, along with home treatment, often relieves pain and allows your knee to heal. If you have a serious knee injury, you may need tests and treatment. Follow-up care is a key part of your treatment and safety. Be sure to make and go to all appointments, and call your doctor if you are having problems. It's also a good idea to know your test results and keep a list of the medicines you take. How can you care for yourself at home? · Be safe with medicines. Read and follow all instructions on the label. ? If the doctor gave you a prescription medicine for pain, take it as prescribed. ? If you are not taking a prescription pain medicine, ask your doctor if you can take an over-the-counter medicine. · Rest and protect your knee. Take a break from any activity that may cause pain. · Put ice or a cold pack on your knee for 10 to 20 minutes at a time. Put a thin cloth between the ice and your skin. · Prop up a sore knee on a pillow when you ice it or anytime you sit or lie down for the next 3 days. Try to keep it above the level of your heart. This will help reduce swelling. · If your knee is not swollen, you can put moist heat, a heating pad, or a warm cloth on your knee. · If your doctor recommends an elastic bandage, sleeve, or other type of support for your knee, wear it as directed.   · Follow your doctor's instructions about how much weight you can put on your leg. Use a cane, crutches, or a walker as instructed. · Follow your doctor's instructions about activity during your healing process. If you can do mild exercise, slowly increase your activity. · Reach and stay at a healthy weight. Extra weight can strain the joints, especially the knees and hips, and make the pain worse. Losing even a few pounds may help. When should you call for help? Call 911 anytime you think you may need emergency care. For example, call if:    · You have symptoms of a blood clot in your lung (called a pulmonary embolism). These may include:  ? Sudden chest pain. ? Trouble breathing. ? Coughing up blood. Call your doctor now or seek immediate medical care if:    · You have severe or increasing pain.     · Your leg or foot turns cold or changes color.     · You cannot stand or put weight on your knee.     · Your knee looks twisted or bent out of shape.     · You cannot move your knee.     · You have signs of infection, such as:  ? Increased pain, swelling, warmth, or redness. ? Red streaks leading from the knee. ? Pus draining from a place on your knee. ? A fever.     · You have signs of a blood clot in your leg (called a deep vein thrombosis), such as:  ? Pain in your calf, back of the knee, thigh, or groin. ? Redness and swelling in your leg or groin. Watch closely for changes in your health, and be sure to contact your doctor if:    · You have tingling, weakness, or numbness in your knee.     · You have any new symptoms, such as swelling.     · You have bruises from a knee injury that last longer than 2 weeks.     · You do not get better as expected. Where can you learn more? Go to http://www.gray.com/  Enter K195 in the search box to learn more about \"Knee Pain or Injury: Care Instructions. \"  Current as of: February 26, 2020               Content Version: 12.8  © 3652-0555 [employer].    Care instructions adapted under license by [employer] (which disclaims liability or warranty for this information). If you have questions about a medical condition or this instruction, always ask your healthcare professional. [address] any warranty or liability for your use of this information.

## 2021-08-03 NOTE — LETTER
Oliver Ginna   1945   [id number]       8/3/2021       I hereby authorize and direct Nino Rock MD, Qiana Prado, and whomever he may designate as his associate to perform upon myself the following procedure:    Injection of: Kenalog into the right knee. If any unforeseen condition arises in the course of the procedure, I further authorize him and his associated and/or assistant(s) to do whatever he/she deems advisable. The nature, purpose, benefits, risks, side effects, likelihood of achieving goals, and potential problems that might occur during recuperation, risks for not receiving the proposed care, treatment and services and alternatives of the procedure have been fully explained to me by my physician including, but not limited to:    Swelling, joint pain, skin pigment changes, worsening of condition, and failure to improve. I acknowledge that no guarantee or assurance has been made to me as to the results that may be obtained or the likelihood of success.                 _______________________________________     Signature of patient or authorized representative                [employer] [facility] fax: [phone]

## 2021-08-03 NOTE — PROGRESS NOTES
Name: Hayder Marin    : 1945     Service Dept: [facility]    Patient's Pharmacies:    [address] , [address] [place], [place] [address]  [address] [place] [address]  [facility] [phone]  Phone: [phone] Fax: [phone]       Chief Complaint   Patient presents with    Knee Pain        There were no vitals taken for this visit. Allergies   Allergen Reactions    Azithromycin Diarrhea    Erythromycin Diarrhea     GI distress      Current Outpatient Medications   Medication Sig Dispense Refill    famotidine (PEPCID) 20 mg tablet       omeprazole (PRILOSEC) 40 mg capsule TAKE 1 CAPSULE BY MOUTH EVERY MORNING AT BREAKFAST      HYDROcodone-chlorpheniramine (TUSSIONEX) 10-8 mg/5 mL suspension SHAKE LIQUID AND TAKE 5 ML BY MOUTH EVERY 12 HOURS AS NEEDED FOR COUGH      hydroCHLOROthiazide (MICROZIDE) 12.5 mg capsule       aspirin 81 mg chewable tablet Take 81 mg by mouth daily.  guaiFENesin-codeine (ROBITUSSIN AC) 100-10 mg/5 mL solution Take 5 mL by mouth nightly as needed for Cough.  ondansetron (Zofran ODT) 4 mg disintegrating tablet 1 Tablet by SubLINGual route every eight (8) hours as needed for Nausea or Vomiting. 10 Tablet 0    gabapentin (NEURONTIN) 300 mg capsule Take 300 mg by mouth nightly.  meloxicam (MOBIC) 15 mg tablet Take 15 mg by mouth daily.  losartan (COZAAR) 50 mg tablet Take 50 mg by mouth daily.  hydroCHLOROthiazide (HYDRODIURIL) 12.5 mg tablet Take 12.5 mg by mouth daily.  cetirizine (ZYRTEC) 10 mg tablet Take 10 mg by mouth daily.  Cholecalciferol, Vitamin D3, 3,000 unit tab Take 2,000 Units by mouth daily.         Patient Active Problem List   Diagnosis Code    Internal and external hemorrhoids without complication A93.6, B00.4      Family History   Problem Relation Age of Onset    Breast Cancer Mother     Heart Disease Father     Breast Cancer Sister     Breast Cancer Maternal Aunt     Breast Cancer Niece     Breast Cancer Maternal Aunt       Social History     Socioeconomic History    Marital status:      Spouse name: Not on file    Number of children: Not on file    Years of education: Not on file    Highest education level: Not on file   Tobacco Use    Smoking status: Former Smoker    Smokeless tobacco: Never Used   Substance and Sexual Activity    Alcohol use: Never     Social Determinants of Health     Financial Resource Strain:     Difficulty of Paying Living Expenses:    Food Insecurity:     Worried About Running Out of Food in the Last Year:     [address] in the Last Year:    Transportation Needs:     Lack of Transportation (Medical):  Lack of Transportation (Non-Medical):    Physical Activity:     Days of Exercise per Week:     Minutes of Exercise per Session:    Stress:     Feeling of Stress :    Social Connections:     Frequency of Communication with Friends and Family:     Frequency of Social Gatherings with Friends and Family:     Attends Shinto Services:     Active Member of Clubs or Organizations:     Attends Club or Organization Meetings:     Marital Status:       Past Surgical History:   Procedure Laterality Date    HX BREAST BIOPSY      HX COLONOSCOPY  2010    HX MASTECTOMY Right     age 41      Past Medical History:   Diagnosis Date    Asthma     Breast cancer ([facility] [address]     age 41   [facility] C. difficile diarrhea     GERD (gastroesophageal reflux disease)     HTN (hypertension)     Menopause     Stool color black         I have reviewed and agree with [address] [place] [address] Nw and ROS and intake form in chart and the record furthermore I have reviewed prior medical record(s) regarding this patients care during this appointment.      Review of Systems:   Patient is a pleasant appearing individual, appropriately dressed, well hydrated, well nourished, who is alert, appropriately oriented for age, and in no acute distress with a normal gait and normal affect who does not appear to be in any significant pain. Physical Exam:  Right Knee -Decrease range of motion with flexion, Knee arc of greater than 50 degrees, Some crepitation, Grossly neurovascularly intact, Good cap refill, No skin lesion, Moderate swelling, No gross instability, Some quadriceps weakness, greater than 50 degree arc    Left Knee - Full Range of Motion, No crepitation, Grossly neurovascularly intact, Good cap refill, No skin lesion, No swelling, No gross instability, No quadriceps weakness    Procedure Documentation:    I discussed in detail the risks, benefits and complications of an injection which included but are not limited to infection, skin reactions, hot swollen joint, and anaphylaxis with the patient. The patient verbalized understanding and gave informed consent for the injection. The patient's knee was flexed to 90° and the skin prepped using sterile alcohol solution. A sterile needle was inserted into the right knee and the mixture of 9 mL Lidocaine 1%, 1 mL Kenalog 40 mg was injected under sterile technique. The needle was withdrawn and the puncture site sealed with a Band-Aid. Technique: Under sterile conditions a [employer] ultrasound unit with a variable frequency (7.0-14.0 MHz) linear transducer was used to localize the placement of needle into the right knee joint. Findings: Successful needle placement for knee injection. Final images were taken and saved for permanent record. The patient tolerated the injection well. The patient was instructed to call the office immediately if there is any pain, redness, warmth, fever, or chills. Encounter Diagnoses     ICD-10-CM ICD-9-CM   1. Right knee pain, unspecified chronicity  M25.561 719.46   2. Osteoarthritis of right knee, unspecified osteoarthritis type  M17.11 715.96       HPI:  The patient is here with a chief complaint of right knee pain, throbbing burning pain, progressively getting worse. Pain is 8/10.   Status post MRI which shows that she has got little bit of arthritis and medial meniscus tear. Assessment/Plan:  Plan at this point, we will try a cortisone injection in right knee. See her back in 3 weeks. If she is not better, we may consider arthroscopic surgery, but she is trying to avoid that. Her x-rays are not bad enough to warrant evaluation for a knee replacement. The next step would be arthroscopic surgery if the injection do not help. We may consider viscosupplementation as another option. As part of continued conservative pain management options the patient was advised to utilize Tylenol or OTC NSAIDS as long as it is not medically contraindicated. Return to Office: Follow-up and Dispositions    · Return in about 3 weeks (around 8/24/2021). Administrations This Visit     lidocaine (XYLOCAINE) 10 mg/mL (1 %) injection 9 mL     Admin Date  08/03/2021 Action  Given Dose  9 mL Route  Other Administered By  Fabiola Lagos LPN          triamcinolone acetonide (KENALOG-40) 40 mg/mL injection 40 mg     Admin Date  08/03/2021 Action  Given Dose  40 mg Route  Intra artICUlar Administered By  Fabiola Lagos LPN               Scribed by Jose Maria Hickey LPN as dictated by [facility] MOUNIKA Patel MD.  Documentation True and Accepted Nino Patel MD

## 2021-08-19 ENCOUNTER — OFFICE VISIT (OUTPATIENT)
Dept: ORTHOPEDIC SURGERY | Age: 76
End: 2021-08-19
Payer: MEDICARE

## 2021-08-19 DIAGNOSIS — S83.221S PERIPHERAL TEAR OF MEDIAL MENISCUS OF RIGHT KNEE AS CURRENT INJURY, SEQUELA: ICD-10-CM

## 2021-08-19 DIAGNOSIS — S83.221S PERIPHERAL TEAR OF MEDIAL MENISCUS OF RIGHT KNEE AS CURRENT INJURY, SEQUELA: Primary | ICD-10-CM

## 2021-08-19 PROCEDURE — G9899 SCRN MAM PERF RSLTS DOC: HCPCS | Performed by: ORTHOPAEDIC SURGERY

## 2021-08-19 PROCEDURE — G8417 CALC BMI ABV UP PARAM F/U: HCPCS | Performed by: ORTHOPAEDIC SURGERY

## 2021-08-19 PROCEDURE — G8432 DEP SCR NOT DOC, RNG: HCPCS | Performed by: ORTHOPAEDIC SURGERY

## 2021-08-19 PROCEDURE — 1101F PT FALLS ASSESS-DOCD LE1/YR: CPT | Performed by: ORTHOPAEDIC SURGERY

## 2021-08-19 PROCEDURE — 99214 OFFICE O/P EST MOD 30 MIN: CPT | Performed by: ORTHOPAEDIC SURGERY

## 2021-08-19 PROCEDURE — G8427 DOCREV CUR MEDS BY ELIG CLIN: HCPCS | Performed by: ORTHOPAEDIC SURGERY

## 2021-08-19 PROCEDURE — G8400 PT W/DXA NO RESULTS DOC: HCPCS | Performed by: ORTHOPAEDIC SURGERY

## 2021-08-19 PROCEDURE — G8536 NO DOC ELDER MAL SCRN: HCPCS | Performed by: ORTHOPAEDIC SURGERY

## 2021-08-19 PROCEDURE — 1090F PRES/ABSN URINE INCON ASSESS: CPT | Performed by: ORTHOPAEDIC SURGERY

## 2021-08-19 PROCEDURE — 3017F COLORECTAL CA SCREEN DOC REV: CPT | Performed by: ORTHOPAEDIC SURGERY

## 2021-08-19 NOTE — PATIENT INSTRUCTIONS
Knee Pain or Injury: Care Instructions  Your Care Instructions     Injuries are a common cause of knee problems. Sudden (acute) injuries may be caused by a direct blow to the knee. They can also be caused by abnormal twisting, bending, or falling on the knee. Pain, bruising, or swelling may be severe, and may start within minutes of the injury. Overuse is another cause of knee pain. Other causes are climbing stairs, kneeling, and other activities that use the knee. Everyday wear and tear, especially as you get older, also can cause knee pain. Rest, along with home treatment, often relieves pain and allows your knee to heal. If you have a serious knee injury, you may need tests and treatment. Follow-up care is a key part of your treatment and safety. Be sure to make and go to all appointments, and call your doctor if you are having problems. It's also a good idea to know your test results and keep a list of the medicines you take. How can you care for yourself at home? · Be safe with medicines. Read and follow all instructions on the label. ? If the doctor gave you a prescription medicine for pain, take it as prescribed. ? If you are not taking a prescription pain medicine, ask your doctor if you can take an over-the-counter medicine. · Rest and protect your knee. Take a break from any activity that may cause pain. · Put ice or a cold pack on your knee for 10 to 20 minutes at a time. Put a thin cloth between the ice and your skin. · Prop up a sore knee on a pillow when you ice it or anytime you sit or lie down for the next 3 days. Try to keep it above the level of your heart. This will help reduce swelling. · If your knee is not swollen, you can put moist heat, a heating pad, or a warm cloth on your knee. · If your doctor recommends an elastic bandage, sleeve, or other type of support for your knee, wear it as directed.   · Follow your doctor's instructions about how much weight you can put on your leg. Use a cane, crutches, or a walker as instructed. · Follow your doctor's instructions about activity during your healing process. If you can do mild exercise, slowly increase your activity. · Reach and stay at a healthy weight. Extra weight can strain the joints, especially the knees and hips, and make the pain worse. Losing even a few pounds may help. When should you call for help? Call 911 anytime you think you may need emergency care. For example, call if:    · You have symptoms of a blood clot in your lung (called a pulmonary embolism). These may include:  ? Sudden chest pain. ? Trouble breathing. ? Coughing up blood. Call your doctor now or seek immediate medical care if:    · You have severe or increasing pain.     · Your leg or foot turns cold or changes color.     · You cannot stand or put weight on your knee.     · Your knee looks twisted or bent out of shape.     · You cannot move your knee.     · You have signs of infection, such as:  ? Increased pain, swelling, warmth, or redness. ? Red streaks leading from the knee. ? Pus draining from a place on your knee. ? A fever.     · You have signs of a blood clot in your leg (called a deep vein thrombosis), such as:  ? Pain in your calf, back of the knee, thigh, or groin. ? Redness and swelling in your leg or groin. Watch closely for changes in your health, and be sure to contact your doctor if:    · You have tingling, weakness, or numbness in your knee.     · You have any new symptoms, such as swelling.     · You have bruises from a knee injury that last longer than 2 weeks.     · You do not get better as expected. Where can you learn more? Go to http://www.gray.com/  Enter K195 in the search box to learn more about \"Knee Pain or Injury: Care Instructions. \"  Current as of: February 26, 2020               Content Version: 12.8  © 9874-9414 [employer].    Care instructions adapted under license by [employer] (which disclaims liability or warranty for this information). If you have questions about a medical condition or this instruction, always ask your healthcare professional. [address] any warranty or liability for your use of this information.

## 2021-08-19 NOTE — PROGRESS NOTES
Name: Oliver Chacko    : 1945     Service Dept: [address] [facility] and Sports Medicine    Patient's Pharmacies:    [address] , [address] [place], [place] [address]way  [address] [place] [address]  [address] [phone]  Phone: [phone] Fax: [phone]       Chief Complaint   Patient presents with    Knee Pain        There were no vitals taken for this visit. Allergies   Allergen Reactions    Azithromycin Diarrhea    Erythromycin Diarrhea     GI distress      Current Outpatient Medications   Medication Sig Dispense Refill    famotidine (PEPCID) 20 mg tablet       omeprazole (PRILOSEC) 40 mg capsule TAKE 1 CAPSULE BY MOUTH EVERY MORNING AT BREAKFAST      HYDROcodone-chlorpheniramine (TUSSIONEX) 10-8 mg/5 mL suspension SHAKE LIQUID AND TAKE 5 ML BY MOUTH EVERY 12 HOURS AS NEEDED FOR COUGH      hydroCHLOROthiazide (MICROZIDE) 12.5 mg capsule       aspirin 81 mg chewable tablet Take 81 mg by mouth daily.  guaiFENesin-codeine (ROBITUSSIN AC) 100-10 mg/5 mL solution Take 5 mL by mouth nightly as needed for Cough.  ondansetron (Zofran ODT) 4 mg disintegrating tablet 1 Tablet by SubLINGual route every eight (8) hours as needed for Nausea or Vomiting. 10 Tablet 0    gabapentin (NEURONTIN) 300 mg capsule Take 300 mg by mouth nightly.  meloxicam (MOBIC) 15 mg tablet Take 15 mg by mouth daily.  losartan (COZAAR) 50 mg tablet Take 50 mg by mouth daily.  hydroCHLOROthiazide (HYDRODIURIL) 12.5 mg tablet Take 12.5 mg by mouth daily.  cetirizine (ZYRTEC) 10 mg tablet Take 10 mg by mouth daily.  Cholecalciferol, Vitamin D3, 3,000 unit tab Take 2,000 Units by mouth daily.         Patient Active Problem List   Diagnosis Code    Internal and external hemorrhoids without complication J49.8, P55.6      Family History   Problem Relation Age of Onset    Breast Cancer Mother     Heart Disease Father     Breast Cancer Sister     Breast Cancer Maternal Aunt     Breast Cancer Niece     Breast Cancer Maternal Aunt       Social History     Socioeconomic History    Marital status:      Spouse name: Not on file    Number of children: Not on file    Years of education: Not on file    Highest education level: Not on file   Tobacco Use    Smoking status: Former Smoker    Smokeless tobacco: Never Used   Substance and Sexual Activity    Alcohol use: Never     Social Determinants of Health     Financial Resource Strain:     Difficulty of Paying Living Expenses:    Food Insecurity:     Worried About Running Out of Food in the Last Year:     [address] in the Last Year:    Transportation Needs:     Lack of Transportation (Medical):  Lack of Transportation (Non-Medical):    Physical Activity:     Days of Exercise per Week:     Minutes of Exercise per Session:    Stress:     Feeling of Stress :    Social Connections:     Frequency of Communication with Friends and Family:     Frequency of Social Gatherings with Friends and Family:     Attends Confucianism Services:     Active Member of Clubs or Organizations:     Attends Club or Organization Meetings:     Marital Status:       Past Surgical History:   Procedure Laterality Date    HX BREAST BIOPSY      HX COLONOSCOPY  2010    HX MASTECTOMY Right     age 41      Past Medical History:   Diagnosis Date    Asthma     Breast cancer ([facility] [address]     age 41   [facility] C. difficile diarrhea     GERD (gastroesophageal reflux disease)     HTN (hypertension)     Menopause     Stool color black         I have reviewed and agree with [address] [place] [address] Nw and ROS and intake form in chart and the record furthermore I have reviewed prior medical record(s) regarding this patients care during this appointment.      Review of Systems:   Patient is a pleasant appearing individual, appropriately dressed, well hydrated, well nourished, who is alert, appropriately oriented for age, and in no acute distress with a normal gait and normal affect who does not appear to be in any significant pain. Physical Exam:  Right Knee - Slight decrease range of motion, Grossly neurovascularly intact, Good cap refill, No skin lesions, Mild swelling, No gross instability, Some Quadriceps weakness, Positive medial joint line tenderness, Positive Dede's exam    Left Knee - Normal range of motion, Grossly neurovascularly intact, Good cap refill, No skin lesions, No swelling, No gross instability, No weakness, No medial joint line tenderness, Negative Dede's exam   Encounter Diagnoses     ICD-10-CM ICD-9-CM   1. Peripheral tear of medial meniscus of right knee as current injury, sequela  S83.221S 905.7       HPI:  The patient is here with a chief complaint of right knee pain. Status post MRI which shows she has got a meniscus tear. Continues to have some difficulty with it. Pain is 8/10. Assessment/Plan:  Plan would be for right knee arthroscopy, meniscectomy, synovectomy, and general medical clearance. We will go from there. As part of continued conservative pain management options the patient was advised to utilize Tylenol or OTC NSAIDS as long as it is not medically contraindicated. Return to Office: Follow-up and Dispositions    · Return for schedule for surgery. Scribed by Re Duggan LPN as dictated by [facility] [place] MOUNIKA Monge MD.  Documentation True and Accepted Nino Monge MD

## 2021-08-19 NOTE — H&P (VIEW-ONLY)
Name: King Prema    : 1945     Service Dept: [address] [facility] and Sports Medicine    Patient's Pharmacies:    [address] , [address] [place], [place] [address]way  [address] [place] [address]  [facility] [address] [phone]  Phone: [phone] Fax: [phone]       Chief Complaint   Patient presents with    Knee Pain        There were no vitals taken for this visit. Allergies   Allergen Reactions    Azithromycin Diarrhea    Erythromycin Diarrhea     GI distress      Current Outpatient Medications   Medication Sig Dispense Refill    famotidine (PEPCID) 20 mg tablet       omeprazole (PRILOSEC) 40 mg capsule TAKE 1 CAPSULE BY MOUTH EVERY MORNING AT BREAKFAST      HYDROcodone-chlorpheniramine (TUSSIONEX) 10-8 mg/5 mL suspension SHAKE LIQUID AND TAKE 5 ML BY MOUTH EVERY 12 HOURS AS NEEDED FOR COUGH      hydroCHLOROthiazide (MICROZIDE) 12.5 mg capsule       aspirin 81 mg chewable tablet Take 81 mg by mouth daily.  guaiFENesin-codeine (ROBITUSSIN AC) 100-10 mg/5 mL solution Take 5 mL by mouth nightly as needed for Cough.  ondansetron (Zofran ODT) 4 mg disintegrating tablet 1 Tablet by SubLINGual route every eight (8) hours as needed for Nausea or Vomiting. 10 Tablet 0    gabapentin (NEURONTIN) 300 mg capsule Take 300 mg by mouth nightly.  meloxicam (MOBIC) 15 mg tablet Take 15 mg by mouth daily.  losartan (COZAAR) 50 mg tablet Take 50 mg by mouth daily.  hydroCHLOROthiazide (HYDRODIURIL) 12.5 mg tablet Take 12.5 mg by mouth daily.  cetirizine (ZYRTEC) 10 mg tablet Take 10 mg by mouth daily.  Cholecalciferol, Vitamin D3, 3,000 unit tab Take 2,000 Units by mouth daily.         Patient Active Problem List   Diagnosis Code    Internal and external hemorrhoids without complication T37.2, M46.7      Family History   Problem Relation Age of Onset    Breast Cancer Mother     Heart Disease Father     Breast Cancer Sister     Breast Cancer Maternal Aunt     Breast Cancer Niece     Breast Cancer Maternal Aunt       Social History     Socioeconomic History    Marital status:      Spouse name: Not on file    Number of children: Not on file    Years of education: Not on file    Highest education level: Not on file   Tobacco Use    Smoking status: Former Smoker    Smokeless tobacco: Never Used   Substance and Sexual Activity    Alcohol use: Never     Social Determinants of Health     Financial Resource Strain:     Difficulty of Paying Living Expenses:    Food Insecurity:     Worried About Running Out of Food in the Last Year:     [address] in the Last Year:    Transportation Needs:     Lack of Transportation (Medical):  Lack of Transportation (Non-Medical):    Physical Activity:     Days of Exercise per Week:     Minutes of Exercise per Session:    Stress:     Feeling of Stress :    Social Connections:     Frequency of Communication with Friends and Family:     Frequency of Social Gatherings with Friends and Family:     Attends Mormonism Services:     Active Member of Clubs or Organizations:     Attends Club or Organization Meetings:     Marital Status:       Past Surgical History:   Procedure Laterality Date    HX BREAST BIOPSY      HX COLONOSCOPY  2010    HX MASTECTOMY Right     age 41      Past Medical History:   Diagnosis Date    Asthma     Breast cancer ([facility] [address]     age 41   [facility] C. difficile diarrhea     GERD (gastroesophageal reflux disease)     HTN (hypertension)     Menopause     Stool color black         I have reviewed and agree with [address] [place] [address] Nw and ROS and intake form in chart and the record furthermore I have reviewed prior medical record(s) regarding this patients care during this appointment.      Review of Systems:   Patient is a pleasant appearing individual, appropriately dressed, well hydrated, well nourished, who is alert, appropriately oriented for age, and in no acute distress with a normal gait and normal affect who does not appear to be in any significant pain. Physical Exam:  Right Knee - Slight decrease range of motion, Grossly neurovascularly intact, Good cap refill, No skin lesions, Mild swelling, No gross instability, Some Quadriceps weakness, Positive medial joint line tenderness, Positive Dede's exam    Left Knee - Normal range of motion, Grossly neurovascularly intact, Good cap refill, No skin lesions, No swelling, No gross instability, No weakness, No medial joint line tenderness, Negative Dede's exam   Encounter Diagnoses     ICD-10-CM ICD-9-CM   1. Peripheral tear of medial meniscus of right knee as current injury, sequela  S83.221S 905.7       HPI:  The patient is here with a chief complaint of right knee pain. Status post MRI which shows she has got a meniscus tear. Continues to have some difficulty with it. Pain is 8/10. Assessment/Plan:  Plan would be for right knee arthroscopy, meniscectomy, synovectomy, and general medical clearance. We will go from there. As part of continued conservative pain management options the patient was advised to utilize Tylenol or OTC NSAIDS as long as it is not medically contraindicated. Return to Office: Follow-up and Dispositions    · Return for schedule for surgery. Scribed by Azul Dubois LPN as dictated by [facility] [place] MOUNIKA Rock MD.  Documentation True and Accepted Nino Rock MD

## 2021-08-24 ENCOUNTER — HOSPITAL ENCOUNTER (OUTPATIENT)
Dept: LAB | Age: 76
Discharge: HOME | End: 2021-08-24
Payer: MEDICARE

## 2021-08-24 ENCOUNTER — HOSPITAL ENCOUNTER (OUTPATIENT)
Dept: NON INVASIVE DIAGNOSTICS | Age: 76
Discharge: HOME | End: 2021-08-24
Payer: MEDICARE

## 2021-08-24 ENCOUNTER — HOSPITAL ENCOUNTER (OUTPATIENT)
Dept: GENERAL RADIOLOGY | Age: 76
Discharge: HOME | End: 2021-08-24
Attending: ORTHOPAEDIC SURGERY
Payer: MEDICARE

## 2021-08-24 DIAGNOSIS — S83.221S PERIPHERAL TEAR OF MEDIAL MENISCUS OF RIGHT KNEE AS CURRENT INJURY, SEQUELA: ICD-10-CM

## 2021-08-24 LAB
ANION GAP SERPL CALC-SCNC: 11 MMOL/L
ATRIAL RATE: 66 BPM
BASOPHILS # BLD: 0.1 K/UL (ref 0–0.1)
BASOPHILS NFR BLD: 2 % (ref 0–2)
BUN SERPL-MCNC: 17 MG/DL (ref 9–21)
BUN/CREAT SERPL: 19
CA-I BLD-MCNC: 9.6 MG/DL (ref 8.5–10.5)
CALCULATED P AXIS, ECG09: 61 DEGREES
CALCULATED R AXIS, ECG10: 4 DEGREES
CALCULATED T AXIS, ECG11: 23 DEGREES
CHLORIDE SERPL-SCNC: 99 MMOL/L (ref 94–111)
CO2 SERPL-SCNC: 27 MMOL/L (ref 21–33)
CREAT SERPL-MCNC: 0.9 MG/DL (ref 0.7–1.2)
DIAGNOSIS, 93000: NORMAL
DIFFERENTIAL METHOD BLD: ABNORMAL
EOSINOPHIL # BLD: 0.2 K/UL (ref 0–0.4)
EOSINOPHIL NFR BLD: 3 % (ref 0–5)
ERYTHROCYTE [DISTWIDTH] IN BLOOD BY AUTOMATED COUNT: 13.8 % (ref 11.6–14.5)
GLUCOSE SERPL-MCNC: 102 MG/DL (ref 70–110)
HCT VFR BLD AUTO: 46.6 % (ref 35–45)
HGB BLD-MCNC: 15.4 G/DL (ref 12–16)
IMM GRANULOCYTES # BLD AUTO: 0 K/UL (ref 0–0.04)
IMM GRANULOCYTES NFR BLD AUTO: 0 % (ref 0–0.5)
LYMPHOCYTES # BLD: 1.1 K/UL (ref 0.9–3.6)
LYMPHOCYTES NFR BLD: 17 % (ref 21–52)
MCH RBC QN AUTO: 29.9 PG (ref 24–34)
MCHC RBC AUTO-ENTMCNC: 33 G/DL (ref 31–37)
MCV RBC AUTO: 90.5 FL (ref 78–100)
MONOCYTES # BLD: 0.6 K/UL (ref 0.05–1.2)
MONOCYTES NFR BLD: 8 % (ref 3–10)
NEUTS SEG # BLD: 4.8 K/UL (ref 1.8–8)
NEUTS SEG NFR BLD: 70 % (ref 40–73)
NRBC # BLD: 0 K/UL (ref 0–0.01)
NRBC BLD-RTO: 0 PER 100 WBC
P-R INTERVAL, ECG05: 186 MS
PLATELET # BLD AUTO: 212 K/UL (ref 135–420)
PMV BLD AUTO: 9.6 FL (ref 9.2–11.8)
POTASSIUM SERPL-SCNC: 4 MMOL/L (ref 3.2–5.1)
Q-T INTERVAL, ECG07: 388 MS
QRS DURATION, ECG06: 72 MS
QTC CALCULATION (BEZET), ECG08: 406 MS
RBC # BLD AUTO: 5.15 M/UL (ref 4.2–5.3)
SODIUM SERPL-SCNC: 137 MMOL/L (ref 135–145)
VENTRICULAR RATE, ECG03: 66 BPM
WBC # BLD AUTO: 6.8 K/UL (ref 4.6–13.2)

## 2021-08-24 PROCEDURE — 80048 BASIC METABOLIC PNL TOTAL CA: CPT

## 2021-08-24 PROCEDURE — 93005 ELECTROCARDIOGRAM TRACING: CPT

## 2021-08-24 PROCEDURE — 71046 X-RAY EXAM CHEST 2 VIEWS: CPT

## 2021-08-24 PROCEDURE — 36415 COLL VENOUS BLD VENIPUNCTURE: CPT

## 2021-08-24 PROCEDURE — 85025 COMPLETE CBC W/AUTO DIFF WBC: CPT

## 2021-08-26 ENCOUNTER — TRANSCRIBE ORDER (OUTPATIENT)
Dept: SCHEDULING | Age: 76
End: 2021-08-26

## 2021-08-26 DIAGNOSIS — I34.1 MITRAL VALVE PROLAPSE: Primary | ICD-10-CM

## 2021-08-27 ENCOUNTER — HOSPITAL ENCOUNTER (OUTPATIENT)
Dept: NON INVASIVE DIAGNOSTICS | Age: 76
Discharge: HOME | End: 2021-08-27
Attending: INTERNAL MEDICINE
Payer: MEDICARE

## 2021-08-27 VITALS
BODY MASS INDEX: 35 KG/M2 | WEIGHT: 223 LBS | DIASTOLIC BLOOD PRESSURE: 70 MMHG | HEIGHT: 67 IN | SYSTOLIC BLOOD PRESSURE: 124 MMHG

## 2021-08-27 DIAGNOSIS — I34.1 MITRAL VALVE PROLAPSE: ICD-10-CM

## 2021-08-27 PROCEDURE — 93306 TTE W/DOPPLER COMPLETE: CPT

## 2021-08-30 LAB
ECHO AO ROOT DIAM: 3.4 CM
ECHO AV AREA PEAK VELOCITY: 3.16 CM2
ECHO AV AREA PLAN/BSA: 1.53
ECHO AV AREA VTI: 3.24 CM2
ECHO AV AREA/BSA PEAK VELOCITY: 1.5 CM2/M2
ECHO AV AREA/BSA VTI: 1.5 CM2/M2
ECHO AV CUSP MM: 1.9 CM
ECHO AV MEAN GRADIENT: 4 MMHG
ECHO AV MEAN VELOCITY: 97.2 CM/S
ECHO AV PEAK GRADIENT: 8 MMHG
ECHO AV PEAK VELOCITY: 140 CM/S
ECHO AV VTI: 30.3 CM
ECHO EST RA PRESSURE: 3 MMHG
ECHO LA AREA 2C: 17.1 CM2
ECHO LA AREA 4C: 21.7 CM2
ECHO LA MAJOR AXIS: 3.6 CM
ECHO LA MAJOR AXIS: 6.43 CM
ECHO LA TO AORTIC ROOT RATIO: 1.06
ECHO LV E' LATERAL VELOCITY: 6.09 CM/S
ECHO LV E' SEPTAL VELOCITY: 4.79 CM/S
ECHO LV EDV A2C: 97.3 CM3
ECHO LV EJECTION FRACTION BIPLANE: 61 % (ref 55–100)
ECHO LV ESV A2C: 29.8 CM3
ECHO LV INTERNAL DIMENSION DIASTOLIC: 4.6 CM (ref 3.9–5.3)
ECHO LV INTERNAL DIMENSION SYSTOLIC: 3.1 CM
ECHO LV IVSD: 1 CM (ref 0.6–0.9)
ECHO LV MASS 2D: 158.8 G (ref 67–162)
ECHO LV MASS INDEX 2D: 74.9 G/M2 (ref 43–95)
ECHO LV POSTERIOR WALL DIASTOLIC: 1 CM (ref 0.6–0.9)
ECHO LVOT DIAM: 2 CM
ECHO LVOT PEAK GRADIENT: 8 MMHG
ECHO LVOT PEAK VELOCITY: 141 CM/S
ECHO LVOT SV: 98 CM3
ECHO LVOT VTI: 31.3 CM
ECHO LVOT VTI: 31.3 CM
ECHO MV A VELOCITY: 84.4 CM/S
ECHO MV AREA PHT: 3.01 CM2
ECHO MV E DECELERATION TIME (DT): 250 MS
ECHO MV E VELOCITY: 71.8 CM/S
ECHO MV E/A RATIO: 0.85
ECHO MV E/E' LATERAL: 11.79
ECHO MV E/E' RATIO (AVERAGED): 13.39
ECHO MV E/E' SEPTAL: 14.99
ECHO MV PRESSURE HALF TIME (PHT): 73 MS
ECHO RA AREA 4C: 15.5 CM2
ECHO RA MAJOR AXIS: 5.44 CM
ECHO RA MINOR AXIS: 3.5 CM
ECHO RIGHT VENTRICULAR SYSTOLIC PRESSURE (RVSP): 24 MMHG
ECHO RV INTERNAL DIMENSION: 3.1 CM
ECHO RV TAPSE: 1.63 CM (ref 1.5–2)
ECHO TV MEAN GRADIENT: 21 MMHG
ECHO TV REGURGITANT MAX VELOCITY: 229 CM/S
LA VOL DISK BP: 59.1 CM3 (ref 22–52)
LVOT MG: 5 MMHG
MV DEC SLOPE: 2870 MM/S2
MV DEC SLOPE: 2870 MM/S2

## 2021-09-07 ENCOUNTER — ANESTHESIA EVENT (OUTPATIENT)
Dept: SURGERY | Age: 76
End: 2021-09-07
Payer: MEDICARE

## 2021-09-07 RX ORDER — SODIUM CHLORIDE 0.9 % (FLUSH) 0.9 %
5-40 SYRINGE (ML) INJECTION EVERY 8 HOURS
Status: CANCELLED | OUTPATIENT
Start: 2021-09-07

## 2021-09-07 RX ORDER — DEXTROSE 50 % IN WATER (D50W) INTRAVENOUS SYRINGE
25-50 AS NEEDED
Status: CANCELLED | OUTPATIENT
Start: 2021-09-07

## 2021-09-07 RX ORDER — MAGNESIUM SULFATE 100 %
4 CRYSTALS MISCELLANEOUS AS NEEDED
Status: CANCELLED | OUTPATIENT
Start: 2021-09-07

## 2021-09-09 ENCOUNTER — OFFICE VISIT (OUTPATIENT)
Dept: ORTHOPEDIC SURGERY | Age: 76
End: 2021-09-09
Payer: MEDICARE

## 2021-09-09 ENCOUNTER — HOSPITAL ENCOUNTER (OUTPATIENT)
Dept: PREADMISSION TESTING | Age: 76
Discharge: HOME | End: 2021-09-09
Payer: MEDICARE

## 2021-09-09 DIAGNOSIS — M25.561 RIGHT KNEE PAIN, UNSPECIFIED CHRONICITY: Primary | ICD-10-CM

## 2021-09-09 DIAGNOSIS — S83.221D PERIPHERAL TEAR OF MEDIAL MENISCUS OF RIGHT KNEE AS CURRENT INJURY, SUBSEQUENT ENCOUNTER: ICD-10-CM

## 2021-09-09 LAB — SARS-COV-2, COV2: NORMAL

## 2021-09-09 PROCEDURE — U0003 INFECTIOUS AGENT DETECTION BY NUCLEIC ACID (DNA OR RNA); SEVERE ACUTE RESPIRATORY SYNDROME CORONAVIRUS 2 (SARS-COV-2) (CORONAVIRUS DISEASE [COVID-19]), AMPLIFIED PROBE TECHNIQUE, MAKING USE OF HIGH THROUGHPUT TECHNOLOGIES AS DESCRIBED BY CMS-2020-01-R: HCPCS

## 2021-09-09 PROCEDURE — 1090F PRES/ABSN URINE INCON ASSESS: CPT | Performed by: ORTHOPAEDIC SURGERY

## 2021-09-09 PROCEDURE — G9899 SCRN MAM PERF RSLTS DOC: HCPCS | Performed by: ORTHOPAEDIC SURGERY

## 2021-09-09 PROCEDURE — G8432 DEP SCR NOT DOC, RNG: HCPCS | Performed by: ORTHOPAEDIC SURGERY

## 2021-09-09 PROCEDURE — G8427 DOCREV CUR MEDS BY ELIG CLIN: HCPCS | Performed by: ORTHOPAEDIC SURGERY

## 2021-09-09 PROCEDURE — G8400 PT W/DXA NO RESULTS DOC: HCPCS | Performed by: ORTHOPAEDIC SURGERY

## 2021-09-09 PROCEDURE — 1101F PT FALLS ASSESS-DOCD LE1/YR: CPT | Performed by: ORTHOPAEDIC SURGERY

## 2021-09-09 PROCEDURE — G8417 CALC BMI ABV UP PARAM F/U: HCPCS | Performed by: ORTHOPAEDIC SURGERY

## 2021-09-09 PROCEDURE — G8536 NO DOC ELDER MAL SCRN: HCPCS | Performed by: ORTHOPAEDIC SURGERY

## 2021-09-09 PROCEDURE — 99214 OFFICE O/P EST MOD 30 MIN: CPT | Performed by: ORTHOPAEDIC SURGERY

## 2021-09-09 PROCEDURE — 3017F COLORECTAL CA SCREEN DOC REV: CPT | Performed by: ORTHOPAEDIC SURGERY

## 2021-09-09 RX ORDER — SODIUM CHLORIDE 0.9 % (FLUSH) 0.9 %
5-40 SYRINGE (ML) INJECTION EVERY 8 HOURS
Status: CANCELLED | OUTPATIENT
Start: 2021-09-09

## 2021-09-09 RX ORDER — SODIUM CHLORIDE 0.9 % (FLUSH) 0.9 %
5-40 SYRINGE (ML) INJECTION AS NEEDED
Status: CANCELLED | OUTPATIENT
Start: 2021-09-09

## 2021-09-09 RX ORDER — INSULIN LISPRO 100 [IU]/ML
INJECTION, SOLUTION INTRAVENOUS; SUBCUTANEOUS ONCE
Status: CANCELLED | OUTPATIENT
Start: 2021-09-09 | End: 2021-09-10

## 2021-09-09 RX ORDER — GABAPENTIN 300 MG/1
300 CAPSULE ORAL ONCE
Status: CANCELLED | OUTPATIENT
Start: 2021-09-09 | End: 2021-09-09

## 2021-09-09 RX ORDER — ACETAMINOPHEN 500 MG
1000 TABLET ORAL ONCE
Status: CANCELLED | OUTPATIENT
Start: 2021-09-09 | End: 2021-09-09

## 2021-09-09 RX ORDER — OXYCODONE AND ACETAMINOPHEN 5; 325 MG/1; MG/1
1 TABLET ORAL
Qty: 30 TABLET | Refills: 0 | Status: SHIPPED | OUTPATIENT
Start: 2021-09-09 | End: 2021-09-23

## 2021-09-09 NOTE — LETTER
9/13/2021    Patient: Rick Santana   YOB: 1945   Date of Visit: 9/9/2021     Natan Giordano MD  [address] [address]  Via Fax: [phone]    Dear Natan Giordano MD,      Thank you for referring Ms. Claudell Gearing to [address] for evaluation. My notes for this consultation are attached. If you have questions, please do not hesitate to call me. I look forward to following your patient along with you.       Sincerely,    Dayana Lees MD

## 2021-09-09 NOTE — PROGRESS NOTES
Name: Manisha Torres    : 1945     Service Dept: [address] [place] [address] and Sports Medicine    Patient's Pharmacies:    [facility] [address] MORE [facility], [address] [place]  [address] [facility]  [place] [address]  Phone: [phone] Fax: [place] [address], [address] [address]  Phone: [phone] Fax: [phone]       Chief Complaint   Patient presents with    Pre-op Exam    Knee Pain        There were no vitals taken for this visit. Allergies   Allergen Reactions    Azithromycin Diarrhea    Erythromycin Diarrhea     GI distress      Current Outpatient Medications   Medication Sig Dispense Refill    famotidine (PEPCID) 20 mg tablet       omeprazole (PRILOSEC) 40 mg capsule TAKE 1 CAPSULE BY MOUTH EVERY MORNING AT BREAKFAST (Patient not taking: Reported on 2021)      HYDROcodone-chlorpheniramine (TUSSIONEX) 10-8 mg/5 mL suspension SHAKE LIQUID AND TAKE 5 ML BY MOUTH EVERY 12 HOURS AS NEEDED FOR COUGH      hydroCHLOROthiazide (MICROZIDE) 12.5 mg capsule       aspirin 81 mg chewable tablet Take 81 mg by mouth daily.  guaiFENesin-codeine (ROBITUSSIN AC) 100-10 mg/5 mL solution Take 5 mL by mouth nightly as needed for Cough. (Patient not taking: Reported on 2021)      ondansetron (Zofran ODT) 4 mg disintegrating tablet 1 Tablet by SubLINGual route every eight (8) hours as needed for Nausea or Vomiting. (Patient not taking: Reported on 2021) 10 Tablet 0    gabapentin (NEURONTIN) 300 mg capsule Take 300 mg by mouth nightly. (Patient not taking: Reported on 2021)      meloxicam (MOBIC) 15 mg tablet Take 15 mg by mouth daily.  losartan (COZAAR) 50 mg tablet Take 50 mg by mouth daily.  hydroCHLOROthiazide (HYDRODIURIL) 12.5 mg tablet Take 12.5 mg by mouth daily.  cetirizine (ZYRTEC) 10 mg tablet Take 10 mg by mouth daily.       Cholecalciferol, Vitamin D3, 3,000 unit tab Take 2,000 Units by mouth daily. Patient Active Problem List   Diagnosis Code    Internal and external hemorrhoids without complication Q64.5, M00.1      Family History   Problem Relation Age of Onset    Breast Cancer Mother     Heart Disease Father     Breast Cancer Sister     Breast Cancer Maternal Aunt     Breast Cancer Niece     Breast Cancer Maternal Aunt       Social History     Socioeconomic History    Marital status:      Spouse name: Not on file    Number of children: Not on file    Years of education: Not on file    Highest education level: Not on file   Tobacco Use    Smoking status: Former Smoker    Smokeless tobacco: Never Used   Substance and Sexual Activity    Alcohol use: Never     Social Determinants of Health     Financial Resource Strain:     Difficulty of Paying Living Expenses:    Food Insecurity:     Worried About Running Out of Food in the Last Year:     Ran Out of Food in the Last Year:    Transportation Needs:     Lack of Transportation (Medical):      Lack of Transportation (Non-Medical):    Physical Activity:     Days of Exercise per Week:     Minutes of Exercise per Session:    Stress:     Feeling of Stress :    Social Connections:     Frequency of Communication with Friends and Family:     Frequency of Social Gatherings with Friends and Family:     Attends Hindu Services:     Active Member of Clubs or Organizations:     Attends Club or Organization Meetings:     Marital Status:       Past Surgical History:   Procedure Laterality Date    HX BREAST BIOPSY      HX COLONOSCOPY  2010    HX MASTECTOMY Right     age 41      Past Medical History:   Diagnosis Date    Asthma     Breast cancer ([address]     age 41   [facility] Neither C. difficile diarrhea     GERD (gastroesophageal reflux disease)     HTN (hypertension)     Menopause     Stool color black         I have reviewed and agree with PFSH and ROS and intake form in chart and the record furthermore I have reviewed prior medical record(s) regarding this patients care during this appointment. Review of Systems:   Patient is a pleasant appearing individual, appropriately dressed, well hydrated, well nourished, who is alert, appropriately oriented for age, and in no acute distress with a normal gait and normal affect who does not appear to be in any significant pain. Physical Exam:  Right Knee - Slight decrease range of motion, Grossly neurovascularly intact, Good cap refill, No skin lesions, Mild swelling, No gross instability, Some Quadriceps weakness, Positive medial joint line tenderness, Positive Dede's exam    Left Knee - Normal range of motion, Grossly neurovascularly intact, Good cap refill, No skin lesions, No swelling, No gross instability, No weakness, No medial joint line tenderness, Negative Dede's exam    A consent for surgery will be documented and signed by the patient or a legal guardian. All questions were answered. The risks of surgery were explained to the patient which include but not limited to infection, reoperation, multiple operations, nerve injury, artery injury, tendon injury, poor result, poor wound healing, unforeseen incidence, etc. Patient was told of no guarantees. Patient accepts all risks and benefits    The patient was counseled about the risks of bryson Covid-19 during their perioperative period and any recovery window from their procedure. The patient was made aware that bryson Covid-19 may worsen their prognosis for recovering from their procedure and lend to a higher morbidity and/or mortality risk. All material risks, benefits, and reasonable alternatives including postponing the procedure were discussed. The patient DOES wish to proceed with their procedure at this time. Encounter Diagnoses     ICD-10-CM ICD-9-CM   1. Right knee pain, unspecified chronicity  M25.561 719.46   2.  Peripheral tear of medial meniscus of right knee as current injury, subsequent encounter  S83.221D V58.89     836.0       HPI:  The patient is here with a chief complaint of right knee pain, sharp, throbbing pain, progressively getting worse. Pain is 8/10. Diagnosed with medial meniscus tear. Assessment/Plan:  Plan will be for right knee arthroscopy, meniscectomy, synovectomy, Percocet only. She already has Zofran. Outpatient surgery, and we will go from there. As part of continued conservative pain management options the patient was advised to utilize Tylenol or OTC NSAIDS as long as it is not medically contraindicated. Return to Office: Follow-up and Dispositions    · Return for already scheduled for surgery. Scribed by Orin Drake LPN as dictated by [facility] [place] MOUNIKA Roman MD.  Documentation True and Accepted Nino MOUNIKA Roman MD

## 2021-09-09 NOTE — PATIENT INSTRUCTIONS
Knee Pain or Injury: Care Instructions  Your Care Instructions     Injuries are a common cause of knee problems. Sudden (acute) injuries may be caused by a direct blow to the knee. They can also be caused by abnormal twisting, bending, or falling on the knee. Pain, bruising, or swelling may be severe, and may start within minutes of the injury. Overuse is another cause of knee pain. Other causes are climbing stairs, kneeling, and other activities that use the knee. Everyday wear and tear, especially as you get older, also can cause knee pain. Rest, along with home treatment, often relieves pain and allows your knee to heal. If you have a serious knee injury, you may need tests and treatment. Follow-up care is a key part of your treatment and safety. Be sure to make and go to all appointments, and call your doctor if you are having problems. It's also a good idea to know your test results and keep a list of the medicines you take. How can you care for yourself at home? · Be safe with medicines. Read and follow all instructions on the label. ? If the doctor gave you a prescription medicine for pain, take it as prescribed. ? If you are not taking a prescription pain medicine, ask your doctor if you can take an over-the-counter medicine. · Rest and protect your knee. Take a break from any activity that may cause pain. · Put ice or a cold pack on your knee for 10 to 20 minutes at a time. Put a thin cloth between the ice and your skin. · Prop up a sore knee on a pillow when you ice it or anytime you sit or lie down for the next 3 days. Try to keep it above the level of your heart. This will help reduce swelling. · If your knee is not swollen, you can put moist heat, a heating pad, or a warm cloth on your knee. · If your doctor recommends an elastic bandage, sleeve, or other type of support for your knee, wear it as directed.   · Follow your doctor's instructions about how much weight you can put on your leg. Use a cane, crutches, or a walker as instructed. · Follow your doctor's instructions about activity during your healing process. If you can do mild exercise, slowly increase your activity. · Reach and stay at a healthy weight. Extra weight can strain the joints, especially the knees and hips, and make the pain worse. Losing even a few pounds may help. When should you call for help? Call 911 anytime you think you may need emergency care. For example, call if:    · You have symptoms of a blood clot in your lung (called a pulmonary embolism). These may include:  ? Sudden chest pain. ? Trouble breathing. ? Coughing up blood. Call your doctor now or seek immediate medical care if:    · You have severe or increasing pain.     · Your leg or foot turns cold or changes color.     · You cannot stand or put weight on your knee.     · Your knee looks twisted or bent out of shape.     · You cannot move your knee.     · You have signs of infection, such as:  ? Increased pain, swelling, warmth, or redness. ? Red streaks leading from the knee. ? Pus draining from a place on your knee. ? A fever.     · You have signs of a blood clot in your leg (called a deep vein thrombosis), such as:  ? Pain in your calf, back of the knee, thigh, or groin. ? Redness and swelling in your leg or groin. Watch closely for changes in your health, and be sure to contact your doctor if:    · You have tingling, weakness, or numbness in your knee.     · You have any new symptoms, such as swelling.     · You have bruises from a knee injury that last longer than 2 weeks.     · You do not get better as expected. Where can you learn more? Go to http://trista-cristy.info/  Enter K195 in the search box to learn more about \"Knee Pain or Injury: Care Instructions. \"  Current as of: February 26, 2020               Content Version: 12.8  © 6456-9582 [employer].    Care instructions adapted under license by [employer] (which disclaims liability or warranty for this information). If you have questions about a medical condition or this instruction, always ask your healthcare professional. [address] any warranty or liability for your use of this information.

## 2021-09-11 LAB — SARS-COV-2, COV2NT: NOT DETECTED

## 2021-09-13 ENCOUNTER — ANESTHESIA (OUTPATIENT)
Dept: SURGERY | Age: 76
End: 2021-09-13
Payer: MEDICARE

## 2021-09-13 ENCOUNTER — HOSPITAL ENCOUNTER (OUTPATIENT)
Age: 76
Discharge: HOME | End: 2021-09-13
Attending: ORTHOPAEDIC SURGERY | Admitting: NURSE PRACTITIONER
Payer: MEDICARE

## 2021-09-13 VITALS
SYSTOLIC BLOOD PRESSURE: 120 MMHG | HEIGHT: 67 IN | TEMPERATURE: 97.3 F | WEIGHT: 220 LBS | OXYGEN SATURATION: 100 % | RESPIRATION RATE: 15 BRPM | DIASTOLIC BLOOD PRESSURE: 58 MMHG | HEART RATE: 65 BPM | BODY MASS INDEX: 34.53 KG/M2

## 2021-09-13 PROBLEM — M17.9 KNEE OSTEOARTHRITIS: Status: ACTIVE | Noted: 2021-09-13

## 2021-09-13 PROCEDURE — 77030016554 HC BLD SHV INCIS2 S&N -B: Performed by: ORTHOPAEDIC SURGERY

## 2021-09-13 PROCEDURE — 74011250636 HC RX REV CODE- 250/636: Performed by: NURSE ANESTHETIST, CERTIFIED REGISTERED

## 2021-09-13 PROCEDURE — 74011000250 HC RX REV CODE- 250: Performed by: ORTHOPAEDIC SURGERY

## 2021-09-13 PROCEDURE — 77030003666 HC NDL SPINAL BD -A: Performed by: ORTHOPAEDIC SURGERY

## 2021-09-13 PROCEDURE — 76210000021 HC REC RM PH II 0.5 TO 1 HR: Performed by: ORTHOPAEDIC SURGERY

## 2021-09-13 PROCEDURE — 76010000138 HC OR TIME 0.5 TO 1 HR: Performed by: ORTHOPAEDIC SURGERY

## 2021-09-13 PROCEDURE — 76060000032 HC ANESTHESIA 0.5 TO 1 HR: Performed by: ORTHOPAEDIC SURGERY

## 2021-09-13 PROCEDURE — 2709999900 HC NON-CHARGEABLE SUPPLY: Performed by: ORTHOPAEDIC SURGERY

## 2021-09-13 PROCEDURE — 74011250636 HC RX REV CODE- 250/636: Performed by: NURSE PRACTITIONER

## 2021-09-13 PROCEDURE — 77030033005 HC TBNG ARTHSC PMP STRY -B: Performed by: ORTHOPAEDIC SURGERY

## 2021-09-13 PROCEDURE — 76210000063 HC OR PH I REC FIRST 0.5 HR: Performed by: ORTHOPAEDIC SURGERY

## 2021-09-13 PROCEDURE — 77030000032 HC CUF TRNQT ZIMM -B: Performed by: ORTHOPAEDIC SURGERY

## 2021-09-13 PROCEDURE — 74011250637 HC RX REV CODE- 250/637: Performed by: NURSE PRACTITIONER

## 2021-09-13 PROCEDURE — 77030010509 HC AIRWY LMA MSK TELE -A: Performed by: NURSE ANESTHETIST, CERTIFIED REGISTERED

## 2021-09-13 PROCEDURE — 77030041614 HC WRP CLD THER BREG -B: Performed by: ORTHOPAEDIC SURGERY

## 2021-09-13 PROCEDURE — 77030018843 HC SOL IRR SOD CL 9% SLSH BAXT -B: Performed by: ORTHOPAEDIC SURGERY

## 2021-09-13 PROCEDURE — 77030002916 HC SUT ETHLN J&J -A: Performed by: ORTHOPAEDIC SURGERY

## 2021-09-13 RX ORDER — SODIUM CHLORIDE, SODIUM LACTATE, POTASSIUM CHLORIDE, CALCIUM CHLORIDE 600; 310; 30; 20 MG/100ML; MG/100ML; MG/100ML; MG/100ML
25 INJECTION, SOLUTION INTRAVENOUS CONTINUOUS
Status: DISCONTINUED | OUTPATIENT
Start: 2021-09-13 | End: 2021-09-13 | Stop reason: HOSPADM

## 2021-09-13 RX ORDER — SODIUM CHLORIDE, SODIUM LACTATE, POTASSIUM CHLORIDE, CALCIUM CHLORIDE 600; 310; 30; 20 MG/100ML; MG/100ML; MG/100ML; MG/100ML
INJECTION, SOLUTION INTRAVENOUS
Status: DISCONTINUED | OUTPATIENT
Start: 2021-09-13 | End: 2021-09-13 | Stop reason: HOSPADM

## 2021-09-13 RX ORDER — FENTANYL CITRATE 50 UG/ML
INJECTION, SOLUTION INTRAMUSCULAR; INTRAVENOUS AS NEEDED
Status: DISCONTINUED | OUTPATIENT
Start: 2021-09-13 | End: 2021-09-13 | Stop reason: HOSPADM

## 2021-09-13 RX ORDER — SODIUM CHLORIDE 0.9 % (FLUSH) 0.9 %
5-40 SYRINGE (ML) INJECTION EVERY 8 HOURS
Status: DISCONTINUED | OUTPATIENT
Start: 2021-09-13 | End: 2021-09-13 | Stop reason: HOSPADM

## 2021-09-13 RX ORDER — BUPIVACAINE HYDROCHLORIDE AND EPINEPHRINE 2.5; 5 MG/ML; UG/ML
INJECTION, SOLUTION EPIDURAL; INFILTRATION; INTRACAUDAL; PERINEURAL AS NEEDED
Status: DISCONTINUED | OUTPATIENT
Start: 2021-09-13 | End: 2021-09-13 | Stop reason: HOSPADM

## 2021-09-13 RX ORDER — CEFAZOLIN SODIUM IN 0.9 % NACL 2 G/100 ML
2 PLASTIC BAG, INJECTION (ML) INTRAVENOUS ONCE
Status: COMPLETED | OUTPATIENT
Start: 2021-09-13 | End: 2021-09-13

## 2021-09-13 RX ORDER — OXYCODONE AND ACETAMINOPHEN 5; 325 MG/1; MG/1
2 TABLET ORAL
Status: DISCONTINUED | OUTPATIENT
Start: 2021-09-13 | End: 2021-09-13 | Stop reason: HOSPADM

## 2021-09-13 RX ORDER — FENTANYL CITRATE 50 UG/ML
50 INJECTION, SOLUTION INTRAMUSCULAR; INTRAVENOUS AS NEEDED
Status: DISCONTINUED | OUTPATIENT
Start: 2021-09-13 | End: 2021-09-13 | Stop reason: HOSPADM

## 2021-09-13 RX ORDER — ONDANSETRON 2 MG/ML
INJECTION INTRAMUSCULAR; INTRAVENOUS AS NEEDED
Status: DISCONTINUED | OUTPATIENT
Start: 2021-09-13 | End: 2021-09-13 | Stop reason: HOSPADM

## 2021-09-13 RX ORDER — CELECOXIB 200 MG/1
400 CAPSULE ORAL
Status: COMPLETED | OUTPATIENT
Start: 2021-09-13 | End: 2021-09-13

## 2021-09-13 RX ORDER — SODIUM CHLORIDE 0.9 % (FLUSH) 0.9 %
5-40 SYRINGE (ML) INJECTION AS NEEDED
Status: DISCONTINUED | OUTPATIENT
Start: 2021-09-13 | End: 2021-09-13 | Stop reason: HOSPADM

## 2021-09-13 RX ORDER — OXYCODONE AND ACETAMINOPHEN 5; 325 MG/1; MG/1
1 TABLET ORAL
Status: DISCONTINUED | OUTPATIENT
Start: 2021-09-13 | End: 2021-09-13 | Stop reason: HOSPADM

## 2021-09-13 RX ORDER — PROPOFOL 10 MG/ML
INJECTION, EMULSION INTRAVENOUS AS NEEDED
Status: DISCONTINUED | OUTPATIENT
Start: 2021-09-13 | End: 2021-09-13 | Stop reason: HOSPADM

## 2021-09-13 RX ORDER — ONDANSETRON 2 MG/ML
4 INJECTION INTRAMUSCULAR; INTRAVENOUS ONCE
Status: DISCONTINUED | OUTPATIENT
Start: 2021-09-13 | End: 2021-09-13 | Stop reason: HOSPADM

## 2021-09-13 RX ADMIN — FENTANYL CITRATE 50 MCG: 50 INJECTION, SOLUTION INTRAMUSCULAR; INTRAVENOUS at 09:18

## 2021-09-13 RX ADMIN — FENTANYL CITRATE 50 MCG: 50 INJECTION, SOLUTION INTRAMUSCULAR; INTRAVENOUS at 08:53

## 2021-09-13 RX ADMIN — OXYCODONE HYDROCHLORIDE AND ACETAMINOPHEN 1 TABLET: 5; 325 TABLET ORAL at 09:46

## 2021-09-13 RX ADMIN — ONDANSETRON HYDROCHLORIDE 4 MG: 2 INJECTION, SOLUTION INTRAMUSCULAR; INTRAVENOUS at 08:28

## 2021-09-13 RX ADMIN — SODIUM CHLORIDE, POTASSIUM CHLORIDE, SODIUM LACTATE AND CALCIUM CHLORIDE 25 ML/HR: 600; 310; 30; 20 INJECTION, SOLUTION INTRAVENOUS at 07:16

## 2021-09-13 RX ADMIN — CELECOXIB 400 MG: 200 CAPSULE ORAL at 07:06

## 2021-09-13 RX ADMIN — FENTANYL CITRATE 25 MCG: 50 INJECTION, SOLUTION INTRAMUSCULAR; INTRAVENOUS at 08:42

## 2021-09-13 RX ADMIN — PROPOFOL 200 MG: 10 INJECTION, EMULSION INTRAVENOUS at 08:28

## 2021-09-13 RX ADMIN — CEFAZOLIN 2 G: 10 INJECTION, POWDER, FOR SOLUTION INTRAVENOUS; PARENTERAL at 08:28

## 2021-09-13 RX ADMIN — FENTANYL CITRATE 25 MCG: 50 INJECTION, SOLUTION INTRAMUSCULAR; INTRAVENOUS at 08:28

## 2021-09-13 RX ADMIN — SODIUM CHLORIDE, POTASSIUM CHLORIDE, SODIUM LACTATE AND CALCIUM CHLORIDE: 600; 310; 30; 20 INJECTION, SOLUTION INTRAVENOUS at 08:21

## 2021-09-13 NOTE — DISCHARGE INSTRUCTIONS
Lower Extremity Post-op Instructions: Your first meal home should be clear liquids    If you are given antibiotics, start antibiotics evening of the surgery unless otherwise instructed. Start Pain meds, the night you have surgery if you need is. No alcohol while on pain meds postop. An Ice bag should be applied to your operative site for at least 20 minutes four times a day. DO NOT USE HEAT-this may cause increased swelling and discomfort. You may move your toes as tolerated. You may bear weight as tolerated unless physical therapy has instructed you otherwise with the weightbearing status. Dressing should remain in place for 5 days. If you have a brace on or a splint on than those dressings and splint needs to remain in place until the follow-up appointment. No driving if you have a splint or a brace on. Swelling and discoloration may be present post-operatively. This is normal.    If your job requires little physical activity you may return as you feel able. If your job requires excessive lifting or use of affected extremity, please discuss return to work date with your nurse or physician. If you are given a virtual appointment please make sure you have a smart phone with the camera. If you need refill of pain medication, call the office 2 business days prior to running out of medication. Please call during regular business hours, Medication refill requests will not be addressed during non-business hours. Please do not page the on-call provider for pain medication refills after hours. If you have had an arthroscopic procedure you will be provided with with pictures. Please bring those pictures at the follow-up appointment. If you have a virtual appointment please have the pictures readily available during your virtual appointment.               Things to watch for:             Increased swelling of the surgical site             Spreading of redness around the incision site Drainage of pus from the incision site             Developing a fever of 101.5 °F or higher that does not respond to Tylenol               If any of these symptoms occur you have any questions please contact our office at [phone]. If you need to talk to Dr. Sharon Fofana on an urgent basis please call the hospital at [phone]. 0 for the . Please let the  know you are a surgical patient of Dr. Sharon Fofana and you wish to get in contact with him. If Dr. Sharon Fofana or his staff do not call you back within 30 minutes. Please tell the  to try again.

## 2021-09-13 NOTE — ANESTHESIA PREPROCEDURE EVALUATION
Relevant Problems   No relevant active problems       Anesthetic History   No history of anesthetic complications            Review of Systems / Medical History  Patient summary reviewed, nursing notes reviewed and pertinent labs reviewed    Pulmonary            Asthma        Neuro/Psych   Within defined limits           Cardiovascular    Hypertension              Exercise tolerance: >4 METS     GI/Hepatic/Renal     GERD           Endo/Other        Arthritis     Other Findings              Physical Exam    Airway  Mallampati: II  TM Distance: 4 - 6 cm  Neck ROM: normal range of motion   Mouth opening: Normal     Cardiovascular  Regular rate and rhythm,  S1 and S2 normal,  no murmur, click, rub, or gallop  Rhythm: regular  Rate: normal         Dental  No notable dental hx       Pulmonary  Breath sounds clear to auscultation               Abdominal  GI exam deferred       Other Findings            Anesthetic Plan    ASA: 2  Anesthesia type: general          Induction: Inhalational and Intravenous  Anesthetic plan and risks discussed with: Patient

## 2021-09-13 NOTE — ANESTHESIA POSTPROCEDURE EVALUATION
Procedure(s):  RIGHT KNEE ARTHROSCOPY/ MEDIAL & LATERAL MENISECTOMY & SYNOV. general    Anesthesia Post Evaluation      Multimodal analgesia: multimodal analgesia used between 6 hours prior to anesthesia start to PACU discharge  Patient location during evaluation: bedside  Patient participation: complete - patient participated  Level of consciousness: awake and alert  Pain score: 2  Pain management: adequate  Airway patency: patent  Anesthetic complications: no  Cardiovascular status: acceptable and stable  Respiratory status: acceptable and room air  Hydration status: acceptable  Comments: Ok to discharge when post op criteria met.    Post anesthesia nausea and vomiting:  none  Final Post Anesthesia Temperature Assessment:  Normothermia (36.0-37.5 degrees C)      INITIAL Post-op Vital signs:   Vitals Value Taken Time   /71 09/13/21 0900   Temp     Pulse 63 09/13/21 0900   Resp 14 09/13/21 0900   SpO2 97 % 09/13/21 0900

## 2021-09-13 NOTE — PROGRESS NOTES
conducted a pre-surgery visit with Lane Cook, who is a 78 y.o.,female. The  provided the following Interventions:  Initiated a relationship of care and support. Offered prayer and assurance of continued prayers on patient's behalf. Plan:  Chaplains will continue to follow and will provide pastoral care on an as needed/requested basis.  recommends bedside caregivers page  on duty if patient shows signs of acute spiritual or emotional distress. Per patient, no concerns. Calm and relax.  provided pre-surgical prayer.      [address] [place] [address]   Staff [address] [facility]   [phone]

## 2021-09-13 NOTE — OP NOTES
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 Synovitis. Complications: No    EBL: Minimal    Specimens: None  Operative knee was prepped and draped in a sterile fashion after adequate anesthesia was given. Lateral portal and anteromedial portal were made along with outflow portal.  Patellofemoral joint was visualized patient was found to have a significant amount of synovitis of all 3 compartments at the at that point major synovectomy of all 3 compartments was performed. Medial compartment there is a posterior medial meniscus tear at that point a shaver and up biter were used to do a partial medial meniscectomy. ACL was intact     Lateral compartment there was a lateral synovitis at that point synovectomy of the lateral compartment is performed. There was a lateral meniscectomy performed for a lateral meniscus tear.       Copious irrigation was performed port portals were closed with Steri-Strips compressive dressing was applied patient was taken to PACU in stable condition after extubation

## 2021-09-13 NOTE — INTERVAL H&P NOTE
Update History & Physical    The Patient's History and Physical was reviewed with the patient. There was no change. The surgical site was confirmed by the patient and me. Patient understands and wants to proceed with the procedure. If applicable, I have discussed with the patient / power of  the rationale for blood component transfusion; its benefits in treating or preventing fatigue, organ damage, or death; and its risk which includes mild transfusion reactions, rare risk of blood borne infection, or more serious but rare reactions. I have discussed the alternatives to transfusion, including the risk and consequences of not receiving transfusion. The patient / Hanane Mcduffie of  had an opportunity to ask questions and had agreed to proceed with transfusion of blood components. Plan:  The risk, benefits, expected outcome, and alternative to the recommended procedure have been discussed with the patient.       Electronically signed by TEENA Dick on 9/13/2021 at 7:31 AM

## 2021-09-27 ENCOUNTER — OFFICE VISIT (OUTPATIENT)
Dept: ORTHOPEDIC SURGERY | Age: 76
End: 2021-09-27
Payer: MEDICARE

## 2021-09-27 DIAGNOSIS — Z98.890 S/P ARTHROSCOPIC KNEE SURGERY: Primary | ICD-10-CM

## 2021-09-27 PROCEDURE — 99024 POSTOP FOLLOW-UP VISIT: CPT | Performed by: NURSE PRACTITIONER

## 2021-09-27 NOTE — PROGRESS NOTES
Name: Mariely Figueroa    : 1945    Weight Loss Metrics 2021 2021 2021 2021 3/16/2021 2020 3/14/2017   Today's Wt - 220 lb 223 lb 220 lb 220 lb 220 lb 197 lb   BMI 34.46 kg/m2 - 34.93 kg/m2 33.95 kg/m2 34.46 kg/m2 33.45 kg/m2 29.95 kg/m2       There is no height or weight on file to calculate BMI. Service Dept: [address] [place] [address] and Sports Medicine    Patient's Pharmacies:    [facility] [address] [facility], [address] [address]  Phone: [phone] Fax: [address], [address] [address]  Phone: [phone] Fax: [phone]       Chief Complaint   Patient presents with    Surgical Follow-up    Knee Pain       HPI:  Patient follows up today for postop recheck of a right knee arthroscopy with medial and lateral meniscectomies and synovectomy. She states that while the knee is sore it is definitely better than before the surgery. There were no vitals taken for this visit. Allergies   Allergen Reactions    Azithromycin Diarrhea    Erythromycin Diarrhea     GI distress      Current Outpatient Medications   Medication Sig Dispense Refill    famotidine (PEPCID) 20 mg tablet       omeprazole (PRILOSEC) 40 mg capsule TAKE 1 CAPSULE BY MOUTH EVERY MORNING AT BREAKFAST (Patient not taking: Reported on 2021)      HYDROcodone-chlorpheniramine (TUSSIONEX) 10-8 mg/5 mL suspension SHAKE LIQUID AND TAKE 5 ML BY MOUTH EVERY 12 HOURS AS NEEDED FOR COUGH      hydroCHLOROthiazide (MICROZIDE) 12.5 mg capsule  (Patient not taking: Reported on 2021)      aspirin 81 mg chewable tablet Take 81 mg by mouth daily.  guaiFENesin-codeine (ROBITUSSIN AC) 100-10 mg/5 mL solution Take 5 mL by mouth nightly as needed for Cough.  (Patient not taking: Reported on 2021)      ondansetron (Zofran ODT) 4 mg disintegrating tablet 1 Tablet by SubLINGual route every eight (8) hours as needed for Nausea or Vomiting. (Patient not taking: Reported on 9/8/2021) 10 Tablet 0    gabapentin (NEURONTIN) 300 mg capsule Take 300 mg by mouth nightly. (Patient not taking: Reported on 9/8/2021)      meloxicam (MOBIC) 15 mg tablet Take 15 mg by mouth daily. (Patient not taking: Reported on 9/13/2021)      losartan (COZAAR) 50 mg tablet Take 50 mg by mouth daily.  hydroCHLOROthiazide (HYDRODIURIL) 12.5 mg tablet Take 12.5 mg by mouth daily.  cetirizine (ZYRTEC) 10 mg tablet Take 10 mg by mouth daily.  Cholecalciferol, Vitamin D3, 3,000 unit tab Take 2,000 Units by mouth daily. Patient Active Problem List   Diagnosis Code    Internal and external hemorrhoids without complication F67.9, E00.8    Knee osteoarthritis M17.10      Family History   Problem Relation Age of Onset    Breast Cancer Mother     Heart Disease Father     Breast Cancer Sister     Breast Cancer Maternal Aunt     Breast Cancer Niece     Breast Cancer Maternal Aunt       Social History     Socioeconomic History    Marital status:      Spouse name: Not on file    Number of children: Not on file    Years of education: Not on file    Highest education level: Not on file   Tobacco Use    Smoking status: Former Smoker    Smokeless tobacco: Never Used   Substance and Sexual Activity    Alcohol use: Never     Social Determinants of Health     Financial Resource Strain:     Difficulty of Paying Living Expenses:    Food Insecurity:     Worried About Running Out of Food in the Last Year:     Ran Out of Food in the Last Year:    Transportation Needs:     Lack of Transportation (Medical):      Lack of Transportation (Non-Medical):    Physical Activity:     Days of Exercise per Week:     Minutes of Exercise per Session:    Stress:     Feeling of Stress :    Social Connections:     Frequency of Communication with Friends and Family:  Frequency of Social Gatherings with Friends and Family:     Attends [employer] Services:     Active Member of Clubs or Organizations:     Attends Club or Organization Meetings:     Marital Status:       Past Surgical History:   Procedure Laterality Date    HX BREAST BIOPSY      HX COLONOSCOPY  2010    HX MASTECTOMY Right     age 41      Past Medical History:   Diagnosis Date    Asthma     Breast cancer ([facility] [address]     age 41   Whitney Chen C. difficile diarrhea     GERD (gastroesophageal reflux disease)     HTN (hypertension)     Menopause     Stool color black         I have reviewed and agree with [address] and ROS and intake form in chart and the record furthermore I have reviewed prior medical record(s) regarding this patients care during this appointment. Review of Systems:   Patient is pleasant appearing individual, appropriately dressed, well hydrated, well nourished, who is alert, appropriately oriented for age, and in no acute distress with a normal gait andnotmal affect who does not appear to be in any significant pain. Physical Exam:  On physical exam today the suture portals are dry and intact with sutures which were easily removed today. There is still some mild swelling about the knee but no erythema or warmth. She has full range of motion in the joint. Encounter Diagnoses     ICD-10-CM ICD-9-CM   1. S/P arthroscopic knee surgery  Z98.890 V45.89       As part of continued conservative pain management options the patient was advised to utilize Tylenol or OTC NSAIDS as long as it is not medically contraindicated. Return to Office:   Today I explained to the patient that there are no restrictions from a surgical standpoint. She is free to progress to activity as tolerated. She does understand that there is some arthritis in that knee and she may still have some aching from time to time on that side related to the degenerative changes.   As long as she is doing well no further routine follow-up will be needed and she will see us back as needed.          [address] [place]

## 2021-09-30 ENCOUNTER — HOSPITAL ENCOUNTER (EMERGENCY)
Age: 76
Discharge: HOME | End: 2021-09-30
Attending: INTERNAL MEDICINE | Admitting: INTERNAL MEDICINE
Payer: MEDICARE

## 2021-09-30 VITALS
WEIGHT: 220 LBS | TEMPERATURE: 98.8 F | BODY MASS INDEX: 34.53 KG/M2 | SYSTOLIC BLOOD PRESSURE: 142 MMHG | HEART RATE: 67 BPM | OXYGEN SATURATION: 98 % | RESPIRATION RATE: 18 BRPM | DIASTOLIC BLOOD PRESSURE: 78 MMHG | HEIGHT: 67 IN

## 2021-09-30 DIAGNOSIS — K21.9 GASTROESOPHAGEAL REFLUX DISEASE WITHOUT ESOPHAGITIS: Primary | ICD-10-CM

## 2021-09-30 LAB
ALBUMIN SERPL-MCNC: 4.1 G/DL (ref 3.5–4.7)
ALBUMIN/GLOB SERPL: 1.5 {RATIO}
ALP SERPL-CCNC: 74 U/L (ref 38–126)
ALT SERPL-CCNC: 28 U/L (ref 3–52)
ANION GAP SERPL CALC-SCNC: 9 MMOL/L
AST SERPL W P-5'-P-CCNC: 21 U/L (ref 14–74)
BASOPHILS # BLD: 0.1 K/UL (ref 0–0.1)
BASOPHILS NFR BLD: 1 % (ref 0–2)
BILIRUB DIRECT SERPL-MCNC: <0.1 MG/DL (ref 0–0.3)
BILIRUB SERPL-MCNC: 0.7 MG/DL (ref 0.2–1)
BUN SERPL-MCNC: 9 MG/DL (ref 9–21)
BUN/CREAT SERPL: 11
CA-I BLD-MCNC: 9.4 MG/DL (ref 8.5–10.5)
CHLORIDE SERPL-SCNC: 102 MMOL/L (ref 94–111)
CO2 SERPL-SCNC: 27 MMOL/L (ref 21–33)
CREAT SERPL-MCNC: 0.8 MG/DL (ref 0.7–1.2)
DIFFERENTIAL METHOD BLD: ABNORMAL
EOSINOPHIL # BLD: 0.2 K/UL (ref 0–0.4)
EOSINOPHIL NFR BLD: 3 % (ref 0–5)
ERYTHROCYTE [DISTWIDTH] IN BLOOD BY AUTOMATED COUNT: 13.7 % (ref 11.6–14.5)
GLOBULIN SER CALC-MCNC: 2.7 G/DL
GLUCOSE SERPL-MCNC: 102 MG/DL (ref 70–110)
HCT VFR BLD AUTO: 47 % (ref 35–45)
HGB BLD-MCNC: 15.6 G/DL (ref 12–16)
IMM GRANULOCYTES # BLD AUTO: 0 K/UL (ref 0–0.04)
IMM GRANULOCYTES NFR BLD AUTO: 1 % (ref 0–0.5)
LACTATE SERPL-SCNC: 1.3 MMOL/L (ref 0.5–2)
LIPASE SERPL-CCNC: 36 U/L (ref 10–57)
LYMPHOCYTES # BLD: 1.1 K/UL (ref 0.9–3.6)
LYMPHOCYTES NFR BLD: 17 % (ref 21–52)
MCH RBC QN AUTO: 30.1 PG (ref 24–34)
MCHC RBC AUTO-ENTMCNC: 33.2 G/DL (ref 31–37)
MCV RBC AUTO: 90.6 FL (ref 78–100)
MONOCYTES # BLD: 0.5 K/UL (ref 0.05–1.2)
MONOCYTES NFR BLD: 8 % (ref 3–10)
NEUTS SEG # BLD: 4.6 K/UL (ref 1.8–8)
NEUTS SEG NFR BLD: 70 % (ref 40–73)
NRBC # BLD: 0 K/UL (ref 0–0.01)
NRBC BLD-RTO: 0 PER 100 WBC
PLATELET # BLD AUTO: 187 K/UL (ref 135–420)
PMV BLD AUTO: 10.6 FL (ref 9.2–11.8)
POTASSIUM SERPL-SCNC: 4 MMOL/L (ref 3.2–5.1)
PROT SERPL-MCNC: 6.8 G/DL (ref 6.1–8.4)
RBC # BLD AUTO: 5.19 M/UL (ref 4.2–5.3)
SODIUM SERPL-SCNC: 138 MMOL/L (ref 135–145)
TROPONIN I SERPL-MCNC: <0.02 NG/ML (ref 0.02–0.05)
WBC # BLD AUTO: 6.5 K/UL (ref 4.6–13.2)

## 2021-09-30 PROCEDURE — 85025 COMPLETE CBC W/AUTO DIFF WBC: CPT

## 2021-09-30 PROCEDURE — 80048 BASIC METABOLIC PNL TOTAL CA: CPT

## 2021-09-30 PROCEDURE — 96375 TX/PRO/DX INJ NEW DRUG ADDON: CPT

## 2021-09-30 PROCEDURE — 84484 ASSAY OF TROPONIN QUANT: CPT

## 2021-09-30 PROCEDURE — 83605 ASSAY OF LACTIC ACID: CPT

## 2021-09-30 PROCEDURE — 99283 EMERGENCY DEPT VISIT LOW MDM: CPT

## 2021-09-30 PROCEDURE — 74011250636 HC RX REV CODE- 250/636: Performed by: INTERNAL MEDICINE

## 2021-09-30 PROCEDURE — 96374 THER/PROPH/DIAG INJ IV PUSH: CPT

## 2021-09-30 PROCEDURE — 80076 HEPATIC FUNCTION PANEL: CPT

## 2021-09-30 PROCEDURE — 83690 ASSAY OF LIPASE: CPT

## 2021-09-30 PROCEDURE — 74011250637 HC RX REV CODE- 250/637: Performed by: INTERNAL MEDICINE

## 2021-09-30 RX ORDER — SUCRALFATE 1 G/1
1 TABLET ORAL ONCE
Status: COMPLETED | OUTPATIENT
Start: 2021-09-30 | End: 2021-09-30

## 2021-09-30 RX ORDER — SUCRALFATE 1 G/10ML
1 SUSPENSION ORAL 4 TIMES DAILY
Qty: 414 ML | Refills: 0 | Status: SHIPPED | OUTPATIENT
Start: 2021-09-30 | End: 2021-10-19

## 2021-09-30 RX ORDER — ONDANSETRON 2 MG/ML
4 INJECTION INTRAMUSCULAR; INTRAVENOUS
Status: COMPLETED | OUTPATIENT
Start: 2021-09-30 | End: 2021-09-30

## 2021-09-30 RX ORDER — METOCLOPRAMIDE HYDROCHLORIDE 5 MG/ML
5 INJECTION INTRAMUSCULAR; INTRAVENOUS ONCE
Status: COMPLETED | OUTPATIENT
Start: 2021-09-30 | End: 2021-09-30

## 2021-09-30 RX ADMIN — ONDANSETRON 4 MG: 2 INJECTION INTRAMUSCULAR; INTRAVENOUS at 15:26

## 2021-09-30 RX ADMIN — METOCLOPRAMIDE 5 MG: 5 INJECTION, SOLUTION INTRAMUSCULAR; INTRAVENOUS at 15:27

## 2021-09-30 RX ADMIN — SODIUM CHLORIDE 1000 ML: 9 INJECTION, SOLUTION INTRAVENOUS at 15:28

## 2021-09-30 RX ADMIN — SUCRALFATE 1 G: 1 TABLET ORAL at 15:46

## 2021-09-30 NOTE — ED PROVIDER NOTES
EMERGENCY DEPARTMENT HISTORY AND PHYSICAL EXAM      Date: 9/30/2021  Patient Name: Chetan Lino      History of Presenting Illness     Chief Complaint   Patient presents with    Nausea    GERD       History Provided By: Patient    HPI: Rebecca Raya, 78 y.o. female with a past medical history significant for HTN; GERD; right mastectomy '88; asthma; C Diff '23 that presents to the ED with cc of severe GERD. Pt states that she had a  Left medial and lateral menisectomy 9/13/21; Dr Awa Samayoa and since then, she has had fluctuating GERD symptoms with heartburn; bloating; nausea [no vomiting]. She takes prilosec 40 mg qam and 20 qpm but has not helped. She called Dr Tyler Bishop on Monday and was prescribed cholestyramine. She has a long standing h/o GERD. She is concerned that in '10 she also had a severe bout of C Diff and wonders if that is what she is having although she is not having diarrhea. Denies fever; chills; black stools; chest pain. There are no other complaints, changes, or physical findings at this time. PCP: Ivy Whitfield MD    Current Outpatient Medications   Medication Sig Dispense Refill    sucralfate (Carafate) 100 mg/mL suspension Take 5 mL by mouth four (4) times daily for 21 days. 414 mL 0    famotidine (PEPCID) 20 mg tablet       omeprazole (PRILOSEC) 40 mg capsule TAKE 1 CAPSULE BY MOUTH EVERY MORNING AT BREAKFAST (Patient not taking: Reported on 9/8/2021)      HYDROcodone-chlorpheniramine (TUSSIONEX) 10-8 mg/5 mL suspension SHAKE LIQUID AND TAKE 5 ML BY MOUTH EVERY 12 HOURS AS NEEDED FOR COUGH      hydroCHLOROthiazide (MICROZIDE) 12.5 mg capsule  (Patient not taking: Reported on 9/13/2021)      aspirin 81 mg chewable tablet Take 81 mg by mouth daily.  guaiFENesin-codeine (ROBITUSSIN AC) 100-10 mg/5 mL solution Take 5 mL by mouth nightly as needed for Cough.  (Patient not taking: Reported on 9/8/2021)      ondansetron (Zofran ODT) 4 mg disintegrating tablet 1 Tablet by SubLINGual route every eight (8) hours as needed for Nausea or Vomiting. (Patient not taking: Reported on 9/8/2021) 10 Tablet 0    gabapentin (NEURONTIN) 300 mg capsule Take 300 mg by mouth nightly. (Patient not taking: Reported on 9/8/2021)      meloxicam (MOBIC) 15 mg tablet Take 15 mg by mouth daily. (Patient not taking: Reported on 9/13/2021)      losartan (COZAAR) 50 mg tablet Take 50 mg by mouth daily.  hydroCHLOROthiazide (HYDRODIURIL) 12.5 mg tablet Take 12.5 mg by mouth daily.  cetirizine (ZYRTEC) 10 mg tablet Take 10 mg by mouth daily.  Cholecalciferol, Vitamin D3, 3,000 unit tab Take 2,000 Units by mouth daily. Past History     Past Medical History:  Past Medical History:   Diagnosis Date    Asthma     Breast cancer ([facility] [address]     age 41    C. difficile diarrhea     GERD (gastroesophageal reflux disease)     HTN (hypertension)     Menopause     Stool color black        Past Surgical History:  Past Surgical History:   Procedure Laterality Date    HX BREAST BIOPSY      HX COLONOSCOPY  2010    HX MASTECTOMY Right     age 41       Family History:  Family History   Problem Relation Age of Onset   [facility] Breast Cancer Mother     Heart Disease Father     Breast Cancer Sister     Breast Cancer Maternal Aunt     Breast Cancer Niece     Breast Cancer Maternal Aunt        Social History:  Social History     Tobacco Use    Smoking status: Former Smoker    Smokeless tobacco: Never Used   Substance Use Topics    Alcohol use: Never    Drug use: Never       Allergies: Allergies   Allergen Reactions    Azithromycin Diarrhea    Erythromycin Diarrhea     GI distress           Review of Systems   Constitutional: Negative for chills and fever. HENT: Negative for sore throat. Gastrointestinal: Positive for abdominal distention and nausea. Negative for blood in stool, constipation, diarrhea and vomiting. Genitourinary: Negative for dysuria and flank pain.    Musculoskeletal: Negative for neck stiffness. Skin: Negative for rash. Neurological: Negative for headaches. Psychiatric/Behavioral: Negative for confusion. Physical Exam     Physical Exam    Lab and Diagnostic Study Results     Labs -     Recent Results (from the past 12 hour(s))   CBC WITH AUTOMATED DIFF    Collection Time: 09/30/21  2:45 PM   Result Value Ref Range    WBC 6.5 4.6 - 13.2 K/uL    RBC 5.19 4.20 - 5.30 M/uL    HGB 15.6 12.0 - 16.0 g/dL    HCT 47.0 (H) 35.0 - 45.0 %    MCV 90.6 78.0 - 100.0 FL    MCH 30.1 24.0 - 34.0 PG    MCHC 33.2 31.0 - 37.0 g/dL    RDW 13.7 11.6 - 14.5 %    PLATELET 763 673 - 469 K/uL    MPV 10.6 9.2 - 11.8 FL    NRBC 0.0 0.0  WBC    ABSOLUTE NRBC 0.00 0.00 - 0.01 K/uL    NEUTROPHILS 70 40 - 73 %    LYMPHOCYTES 17 (L) 21 - 52 %    MONOCYTES 8 3 - 10 %    EOSINOPHILS 3 0 - 5 %    BASOPHILS 1 0 - 2 %    IMMATURE GRANULOCYTES 1 (H) 0 - 0.5 %    ABS. NEUTROPHILS 4.6 1.8 - 8.0 K/UL    ABS. LYMPHOCYTES 1.1 0.9 - 3.6 K/UL    ABS. MONOCYTES 0.5 0.05 - 1.2 K/UL    ABS. EOSINOPHILS 0.2 0.0 - 0.4 K/UL    ABS. BASOPHILS 0.1 0.0 - 0.1 K/UL    ABS. IMM.  GRANS. 0.0 0.00 - 0.04 K/UL    DF AUTOMATED     METABOLIC PANEL, BASIC    Collection Time: 09/30/21  2:45 PM   Result Value Ref Range    Sodium 138 135 - 145 mmol/L    Potassium 4.0 3.2 - 5.1 mmol/L    Chloride 102 94 - 111 mmol/L    CO2 27 21 - 33 mmol/L    Anion gap 9 mmol/L    Glucose 102 70 - 110 mg/dL    BUN 9 9 - 21 mg/dL    Creatinine 0.80 0.70 - 1.20 mg/dL    BUN/Creatinine ratio 11      GFR est AA >60 ml/min/1.73m2    GFR est non-AA >60 ml/min/1.73m2    Calcium 9.4 8.5 - 10.5 mg/dL   TROPONIN I    Collection Time: 09/30/21  2:45 PM   Result Value Ref Range    Troponin-I, Qt. <0.02 (L) 0.02 - 0.05 ng/mL   HEPATIC FUNCTION PANEL    Collection Time: 09/30/21  2:45 PM   Result Value Ref Range    Protein, total 6.8 6.1 - 8.4 g/dL    Albumin 4.1 3.5 - 4.7 g/dL    Globulin 2.7 g/dL    A-G Ratio 1.5      Bilirubin, total 0.7 0.2 - 1.0 mg/dL Bilirubin, direct <0.1 0.0 - 0.3 mg/dL    Alk. phosphatase 74 38 - 126 U/L    AST (SGOT) 21 14 - 74 U/L    ALT (SGPT) 28 3 - 52 U/L   LACTIC ACID    Collection Time: 09/30/21  2:45 PM   Result Value Ref Range    Lactic acid 1.3 0.5 - 2.0 mmol/L   LIPASE    Collection Time: 09/30/21  2:45 PM   Result Value Ref Range    Lipase 36 10 - 57 U/L       Radiologic Studies -   [unfilled]  CT Results  (Last 48 hours)    None        CXR Results  (Last 48 hours)    None          Medical Decision Making and ED Course   - I am the first and primary provider for this patient AND AM THE PRIMARY PROVIDER OF RECORD. - I reviewed the vital signs, available nursing notes, past medical history, past surgical history, family history and social history. - Initial assessment performed. The patients presenting problems have been discussed, and the staff are in agreement with the care plan formulated and outlined with them. I have encouraged them to ask questions as they arise throughout their visit. Vital Signs-Reviewed the patient's vital signs. Patient Vitals for the past 12 hrs:   Temp Pulse Resp BP SpO2   09/30/21 1355 98.8 °F (37.1 °C) 78 18 (!) 162/80 98 %         Records Reviewed: Nursing Notes  ED Course:   Delayed due to difficulty finding IV         Provider Notes (Medical Decision Making):         Consultations:       Consultations:      Procedures and Critical Care         Disposition     Disposition: {Discharged    *Remove if not discharged  DISCHARGE PLAN:  1.    Current Discharge Medication List      CONTINUE these medications which have NOT CHANGED    Details   famotidine (PEPCID) 20 mg tablet       omeprazole (PRILOSEC) 40 mg capsule TAKE 1 CAPSULE BY MOUTH EVERY MORNING AT BREAKFAST      HYDROcodone-chlorpheniramine (TUSSIONEX) 10-8 mg/5 mL suspension SHAKE LIQUID AND TAKE 5 ML BY MOUTH EVERY 12 HOURS AS NEEDED FOR COUGH      hydroCHLOROthiazide (MICROZIDE) 12.5 mg capsule       aspirin 81 mg chewable tablet Take 81 mg by mouth daily. guaiFENesin-codeine (ROBITUSSIN AC) 100-10 mg/5 mL solution Take 5 mL by mouth nightly as needed for Cough. ondansetron (Zofran ODT) 4 mg disintegrating tablet 1 Tablet by SubLINGual route every eight (8) hours as needed for Nausea or Vomiting. Qty: 10 Tablet, Refills: 0      gabapentin (NEURONTIN) 300 mg capsule Take 300 mg by mouth nightly. meloxicam (MOBIC) 15 mg tablet Take 15 mg by mouth daily. losartan (COZAAR) 50 mg tablet Take 50 mg by mouth daily. hydroCHLOROthiazide (HYDRODIURIL) 12.5 mg tablet Take 12.5 mg by mouth daily. cetirizine (ZYRTEC) 10 mg tablet Take 10 mg by mouth daily. Cholecalciferol, Vitamin D3, 3,000 unit tab Take 2,000 Units by mouth daily. 2.   Follow-up Information     Follow up With Specialties Details Why Kirill Bustos MD Gastroenterology, Internal Medicine Schedule an appointment as soon as possible for a visit in 1 week  [address] [place] [address]  [phone]          3. Return to ED if worse   4. Current Discharge Medication List      START taking these medications    Details   sucralfate (Carafate) 100 mg/mL suspension Take 5 mL by mouth four (4) times daily for 21 days. Qty: 414 mL, Refills: 0  Start date: 9/30/2021, End date: 10/21/2021             Diagnosis     Clinical Impression:   1. Gastroesophageal reflux disease without esophagitis        Attestations:    Domenic Dakins, MD    Please note that this dictation was completed with [employer], the [employer] voice recognition software. Quite often unanticipated grammatical, syntax, homophones, and other interpretive errors are inadvertently transcribed by the computer software. Please disregard these errors. Please excuse any errors that have escaped final proofreading. Thank you.

## 2021-10-19 ENCOUNTER — OFFICE VISIT (OUTPATIENT)
Dept: ORTHOPEDIC SURGERY | Age: 76
End: 2021-10-19
Payer: MEDICARE

## 2021-10-19 DIAGNOSIS — M17.11 OSTEOARTHRITIS OF RIGHT KNEE, UNSPECIFIED OSTEOARTHRITIS TYPE: ICD-10-CM

## 2021-10-19 DIAGNOSIS — M25.561 RIGHT KNEE PAIN, UNSPECIFIED CHRONICITY: Primary | ICD-10-CM

## 2021-10-19 PROCEDURE — 20611 DRAIN/INJ JOINT/BURSA W/US: CPT | Performed by: ORTHOPAEDIC SURGERY

## 2021-10-19 PROCEDURE — 99024 POSTOP FOLLOW-UP VISIT: CPT | Performed by: ORTHOPAEDIC SURGERY

## 2021-10-19 RX ORDER — TRIAMCINOLONE ACETONIDE 40 MG/ML
40 INJECTION, SUSPENSION INTRA-ARTICULAR; INTRAMUSCULAR ONCE
Status: COMPLETED | OUTPATIENT
Start: 2021-10-19 | End: 2021-10-19

## 2021-10-19 RX ORDER — ONDANSETRON HYDROCHLORIDE 8 MG/1
TABLET, FILM COATED ORAL
COMMUNITY
Start: 2021-10-01

## 2021-10-19 RX ORDER — SUCRALFATE 1 G/1
TABLET ORAL
COMMUNITY
Start: 2021-09-30

## 2021-10-19 RX ORDER — LIDOCAINE HYDROCHLORIDE 10 MG/ML
9 INJECTION INFILTRATION; PERINEURAL ONCE
Status: COMPLETED | OUTPATIENT
Start: 2021-10-19 | End: 2021-10-19

## 2021-10-19 RX ORDER — LORAZEPAM 0.5 MG/1
TABLET ORAL
COMMUNITY
Start: 2021-10-01

## 2021-10-19 RX ORDER — CHOLESTYRAMINE 4 G/9G
POWDER, FOR SUSPENSION ORAL
COMMUNITY
Start: 2021-09-28

## 2021-10-19 RX ORDER — METOCLOPRAMIDE 10 MG/1
TABLET ORAL
COMMUNITY
Start: 2021-10-01

## 2021-10-19 RX ADMIN — LIDOCAINE HYDROCHLORIDE 9 ML: 10 INJECTION INFILTRATION; PERINEURAL at 12:00

## 2021-10-19 RX ADMIN — TRIAMCINOLONE ACETONIDE 40 MG: 40 INJECTION, SUSPENSION INTRA-ARTICULAR; INTRAMUSCULAR at 12:00

## 2021-10-19 NOTE — LETTER
10/20/2021    Patient: Oliver Teran   YOB: 1945   Date of Visit: 10/19/2021     Heidi Ashford MD  [address] [address]  Via Fax: [phone]    Dear Heidi Ashford MD,      Thank you for referring Ms. Gage Kemp to [address] for evaluation. My notes for this consultation are attached. If you have questions, please do not hesitate to call me. I look forward to following your patient along with you.       Sincerely,    Pallavi Manuel MD

## 2021-10-19 NOTE — PATIENT INSTRUCTIONS
Knee Arthritis: Care Instructions  Your Care Instructions     Knee arthritis is a breakdown of the cartilage that cushions your knee joint. When the cartilage wears down, your bones rub against each other. This causes pain and stiffness. Knee arthritis tends to get worse with time. Treatment for knee arthritis involves reducing pain, making the leg muscles stronger, and staying at a healthy body weight. The treatment usually does not improve the health of the cartilage, but it can reduce pain and improve how well your knee works. You can take simple measures to protect your knee joints, ease your pain, and help you stay active. Follow-up care is a key part of your treatment and safety. Be sure to make and go to all appointments, and call your doctor if you are having problems. It's also a good idea to know your test results and keep a list of the medicines you take. How can you care for yourself at home? · Know that knee arthritis will cause more pain on some days than on others. · Stay at a healthy weight. Lose weight if you are overweight. When you stand up, the pressure on your knees from every pound of body weight is multiplied four times. So if you lose 10 pounds, you will reduce the pressure on your knees by 40 pounds. · Talk to your doctor or physical therapist about exercises that will help ease joint pain. ? Stretch to help prevent stiffness and to prevent injury before you exercise. You may enjoy gentle forms of yoga to help keep your knee joints and muscles flexible. ? Walk instead of jog.  ? Ride a bike. This makes your thigh muscles stronger and takes pressure off your knee. ? Wear well-fitting and comfortable shoes. ? Exercise in chest-deep water. This can help you exercise longer with less pain. ? Avoid exercises that include squatting or kneeling. They can put a lot of strain on your knees.   ? Talk to your doctor to make sure that the exercise you do is not making the arthritis worse.  · Do not sit for long periods of time. Try to walk once in a while to keep your knee from getting stiff. · Ask your doctor or physical therapist whether shoe inserts may reduce your arthritis pain. · If you can afford it, get new athletic shoes at least every year. This can help reduce the strain on your knees. · Use a device to help you do everyday activities. ? A cane or walking stick can help you keep your balance when you walk. Hold the cane or walking stick in the hand opposite the painful knee. ? If you feel like you may fall when you walk, try using crutches or a front-wheeled walker. These can prevent falls that could cause more damage to your knee. ? A knee brace may help keep your knee stable and prevent pain. ? You also can use other things to make life easier, such as a higher toilet seat and handrails in the bathtub or shower. · Take pain medicines exactly as directed. ? Do not wait until you are in severe pain. You will get better results if you take it sooner. ? If you are not taking a prescription pain medicine, take an over-the-counter medicine such as acetaminophen (Tylenol), ibuprofen (Advil, Motrin), or naproxen (Aleve). Read and follow all instructions on the label. ? Do not take two or more pain medicines at the same time unless the doctor told you to. Many pain medicines have acetaminophen, which is Tylenol. Too much acetaminophen (Tylenol) can be harmful. ? Tell your doctor if you take a blood thinner, have diabetes, or have allergies to shellfish. · Ask your doctor if you might benefit from a shot of steroid medicine into your knee. This may provide pain relief for several months. · Many people take the supplements glucosamine and chondroitin for osteoarthritis. Some people feel they help, but the medical research does not show that they work. Talk to your doctor before you take these supplements. When should you call for help?    Call your doctor now or seek immediate medical care if:    · You have sudden swelling, warmth, or pain in your knee.     · You have knee pain and a fever or rash.     · You have such bad pain that you cannot use your knee. Watch closely for changes in your health, and be sure to contact your doctor if you have any problems. Where can you learn more? Go to http://www.gray.com/  Enter W187 in the search box to learn more about \"Knee Arthritis: Care Instructions. \"  Current as of: April 30, 2021               Content Version: 13.0  © 6452-9310 [employer]. Care instructions adapted under license by [employer] (which disclaims liability or warranty for this information). If you have questions about a medical condition or this instruction, always ask your healthcare professional. [address] any warranty or liability for your use of this information.

## 2022-01-25 ENCOUNTER — TRANSCRIBE ORDER (OUTPATIENT)
Dept: SCHEDULING | Age: 77
End: 2022-01-25

## 2022-01-25 DIAGNOSIS — J32.2 CHRONIC ETHMOIDAL SINUSITIS: Primary | ICD-10-CM

## 2022-02-01 ENCOUNTER — HOSPITAL ENCOUNTER (OUTPATIENT)
Dept: CT IMAGING | Age: 77
Discharge: HOME | End: 2022-02-01
Attending: OTOLARYNGOLOGY
Payer: MEDICARE

## 2022-02-01 DIAGNOSIS — J32.2 CHRONIC ETHMOIDAL SINUSITIS: ICD-10-CM

## 2022-02-01 PROCEDURE — 70486 CT MAXILLOFACIAL W/O DYE: CPT

## 2022-03-19 PROBLEM — M17.9 KNEE OSTEOARTHRITIS: Status: ACTIVE | Noted: 2021-09-13

## 2022-08-29 ENCOUNTER — TRANSCRIBE ORDER (OUTPATIENT)
Dept: SCHEDULING | Age: 77
End: 2022-08-29

## 2022-08-29 DIAGNOSIS — Z12.31 OTHER SCREENING MAMMOGRAM: Primary | ICD-10-CM

## 2022-09-08 ENCOUNTER — HOSPITAL ENCOUNTER (OUTPATIENT)
Dept: GENERAL RADIOLOGY | Age: 77
Discharge: HOME | End: 2022-09-08
Attending: INTERNAL MEDICINE
Payer: MEDICARE

## 2022-09-08 ENCOUNTER — TRANSCRIBE ORDER (OUTPATIENT)
Dept: REGISTRATION | Age: 77
End: 2022-09-08

## 2022-09-08 ENCOUNTER — HOSPITAL ENCOUNTER (OUTPATIENT)
Dept: MAMMOGRAPHY | Age: 77
Discharge: HOME | End: 2022-09-08
Attending: INTERNAL MEDICINE
Payer: MEDICARE

## 2022-09-08 DIAGNOSIS — R05.1 ACUTE COUGH: Primary | ICD-10-CM

## 2022-09-08 DIAGNOSIS — R05.1 ACUTE COUGH: ICD-10-CM

## 2022-09-08 DIAGNOSIS — Z12.31 OTHER SCREENING MAMMOGRAM: ICD-10-CM

## 2022-09-08 PROCEDURE — 71046 X-RAY EXAM CHEST 2 VIEWS: CPT

## 2022-09-08 PROCEDURE — 77063 BREAST TOMOSYNTHESIS BI: CPT

## 2022-12-05 ENCOUNTER — TRANSCRIBE ORDER (OUTPATIENT)
Dept: REGISTRATION | Age: 77
End: 2022-12-05

## 2022-12-05 ENCOUNTER — HOSPITAL ENCOUNTER (OUTPATIENT)
Dept: LAB | Age: 77
Discharge: HOME | End: 2022-12-05
Payer: MEDICARE

## 2022-12-05 DIAGNOSIS — R68.89 FLU-LIKE SYMPTOMS: Primary | ICD-10-CM

## 2022-12-05 DIAGNOSIS — R68.89 FLU-LIKE SYMPTOMS: ICD-10-CM

## 2022-12-05 LAB
FLUAV AG NPH QL IA: NEGATIVE
FLUBV AG NOSE QL IA: NEGATIVE

## 2022-12-05 PROCEDURE — 87804 INFLUENZA ASSAY W/OPTIC: CPT

## 2023-01-17 ENCOUNTER — OFFICE VISIT (OUTPATIENT)
Dept: ORTHOPEDIC SURGERY | Age: 78
End: 2023-01-17
Payer: MEDICARE

## 2023-01-17 VITALS — HEIGHT: 67 IN | WEIGHT: 214 LBS | BODY MASS INDEX: 33.59 KG/M2

## 2023-01-17 DIAGNOSIS — M25.561 RIGHT KNEE PAIN, UNSPECIFIED CHRONICITY: Primary | ICD-10-CM

## 2023-01-17 DIAGNOSIS — M17.11 OSTEOARTHRITIS OF RIGHT KNEE, UNSPECIFIED OSTEOARTHRITIS TYPE: ICD-10-CM

## 2023-01-17 PROBLEM — L81.4 LENTIGINES: Status: ACTIVE | Noted: 2023-01-17

## 2023-01-17 PROBLEM — L82.1 SEBORRHEIC KERATOSES: Status: ACTIVE | Noted: 2023-01-17

## 2023-01-17 PROBLEM — L57.8 SOLAR DEGENERATION: Status: ACTIVE | Noted: 2023-01-17

## 2023-01-17 RX ORDER — SIMVASTATIN 10 MG/1
TABLET, FILM COATED ORAL
COMMUNITY
Start: 2022-12-05

## 2023-01-17 RX ORDER — DOXYCYCLINE 100 MG/1
TABLET ORAL
COMMUNITY
Start: 2022-12-19

## 2023-01-17 RX ORDER — LIDOCAINE HYDROCHLORIDE 10 MG/ML
9 INJECTION INFILTRATION; PERINEURAL ONCE
Status: COMPLETED | OUTPATIENT
Start: 2023-01-17 | End: 2023-01-17

## 2023-01-17 RX ORDER — NITROFURANTOIN 25; 75 MG/1; MG/1
CAPSULE ORAL
COMMUNITY
Start: 2022-11-17

## 2023-01-17 RX ORDER — TRIAMCINOLONE ACETONIDE 40 MG/ML
40 INJECTION, SUSPENSION INTRA-ARTICULAR; INTRAMUSCULAR ONCE
Status: COMPLETED | OUTPATIENT
Start: 2023-01-17 | End: 2023-01-17

## 2023-01-17 RX ORDER — MONTELUKAST SODIUM 10 MG/1
TABLET ORAL
COMMUNITY
Start: 2023-01-09

## 2023-01-17 RX ORDER — FLUTICASONE PROPIONATE 50 MCG
SPRAY, SUSPENSION (ML) NASAL
COMMUNITY

## 2023-01-17 RX ORDER — MOMETASONE FUROATE 1 MG/G
CREAM TOPICAL
COMMUNITY
Start: 2022-11-01

## 2023-01-17 RX ADMIN — TRIAMCINOLONE ACETONIDE 40 MG: 40 INJECTION, SUSPENSION INTRA-ARTICULAR; INTRAMUSCULAR at 09:00

## 2023-01-17 RX ADMIN — LIDOCAINE HYDROCHLORIDE 9 ML: 10 INJECTION INFILTRATION; PERINEURAL at 09:00

## 2023-01-17 NOTE — PROGRESS NOTES
Name: Kyrie Flores    : 1945     Service Dept: [address] [place] [address] Medicine    Chief Complaint   Patient presents with    Knee Pain        Visit Vitals  Ht 5' 7\" (1.702 m)   Wt 214 lb (97.1 kg)   BMI 33.52 kg/m²        Allergies   Allergen Reactions    Azithromycin Diarrhea     Other reaction(s): Unknown    Erythromycin Diarrhea     GI distress        Current Outpatient Medications   Medication Sig Dispense Refill    doxycycline (ADOXA) 100 mg tablet       fluticasone propionate (FLONASE) 50 mcg/actuation nasal spray 1 spray in each nostril Nasally Twice a day      mometasone (ELOCON) 0.1 % topical cream APPLY A THIN PEA SIZE LAYER TO THE AFFECTED AREA ONCE DAILY      montelukast (SINGULAIR) 10 mg tablet       nitrofurantoin, macrocrystal-monohydrate, (MACROBID) 100 mg capsule       simvastatin (ZOCOR) 10 mg tablet       cholestyramine (QUESTRAN) 4 gram packet DISSOLVE & TAKE THE CONTENTS OF 1 PACKET BY MOUTH 4 TIMES DAILY BEFORE MEAL(S) AND AT BEDTIME IN A 2 4 OZ LIQUID      LORazepam (ATIVAN) 0.5 mg tablet       metoclopramide HCl (REGLAN) 10 mg tablet       ondansetron hcl (ZOFRAN) 8 mg tablet       sucralfate (CARAFATE) 1 gram tablet       famotidine (PEPCID) 20 mg tablet       omeprazole (PRILOSEC) 40 mg capsule TAKE 1 CAPSULE BY MOUTH EVERY MORNING AT BREAKFAST      HYDROcodone-chlorpheniramine (TUSSIONEX) 10-8 mg/5 mL suspension SHAKE LIQUID AND TAKE 5 ML BY MOUTH EVERY 12 HOURS AS NEEDED FOR COUGH      hydroCHLOROthiazide (MICROZIDE) 12.5 mg capsule       aspirin 81 mg chewable tablet Take 81 mg by mouth daily. guaiFENesin-codeine (ROBITUSSIN AC) 100-10 mg/5 mL solution Take 5 mL by mouth nightly as needed for Cough. ondansetron (Zofran ODT) 4 mg disintegrating tablet 1 Tablet by SubLINGual route every eight (8) hours as needed for Nausea or Vomiting. 10 Tablet 0    gabapentin (NEURONTIN) 300 mg capsule Take 300 mg by mouth nightly.       meloxicam (MOBIC) 15 mg tablet Take 15 mg by mouth daily. losartan (COZAAR) 50 mg tablet Take 50 mg by mouth daily. cetirizine (ZYRTEC) 10 mg tablet Take 10 mg by mouth daily. Cholecalciferol, Vitamin D3, 3,000 unit tab Take 2,000 Units by mouth daily. 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    Seborrheic keratoses L82.1    Lentigines L81.4      Family History   Problem Relation Age of Onset    Breast Cancer Mother     Heart Disease Father     Breast Cancer Sister     Breast Cancer Maternal Aunt     Breast Cancer Niece     Breast Cancer Maternal Aunt       Social History     Socioeconomic History    Marital status:    Tobacco Use    Smoking status: Former    Smokeless tobacco: Never   Substance and Sexual Activity    Alcohol use: Never    Drug use: Never      Past Surgical History:   Procedure Laterality Date    HX BREAST BIOPSY      HX COLONOSCOPY  2010    HX MASTECTOMY Right     age 41      Past Medical History:   Diagnosis Date    Asthma     Breast cancer ([facility] [address]     age 41    C. difficile diarrhea     GERD (gastroesophageal reflux disease)     HTN (hypertension)     Menopause     Stool color black         I have [facility] and agree [facility] and ROS and intake form in chart and the record furthermore I have reviewed prior medical record(s) regarding this patients care during this appointment.      Review of Systems:   Patient is a pleasant appearing individual, appropriately dressed, well hydrated, well nourished, who is alert, appropriately oriented for age, and in no acute distress with a normal gait and normal affect who does not appear to be in any significant pain. Physical Exam:  Right Knee -Decrease range of motion with flexion, Knee arc of greater than 50 degrees, Some crepitation, Grossly neurovascularly intact, Good cap refill, No skin lesion, Moderate swelling, some gross instability, Some quadriceps weakness, Kellgren and Vishal at least grade 3    Left Knee - Full Range of Motion, No crepitation, Grossly neurovascularly intact, Good cap refill, No skin lesion, No swelling, No gross instability, No quadriceps weakness     Procedure Documentation:    I discussed in detail the risks, benefits and complications of an injection which included but are not limited to infection, skin reactions, hot swollen joint, and anaphylaxis with the patient. The patient verbalized understanding and gave informed consent for the injection. The patient's knee was flexed to 90° and the skin prepped using sterile alcohol solution. A sterile needle was inserted into the right knee and the mixture of 9 mL Lidocaine 1%, 1 mL Kenalog 40 mg was injected under sterile technique. The needle was withdrawn and the puncture site sealed with a Band-Aid. Technique: Under sterile conditions a Overwatch ultrasound unit with a variable frequency (7.0-14.0 MHz) linear transducer was used to localize the placement of needle into the right knee joint. Findings: Successful needle placement for knee injection. Final images were taken and saved for permanent record. The patient tolerated the injection well. The patient was instructed to call the office immediately if there is any pain, redness, warmth, fever, or chills. Encounter Diagnoses     ICD-10-CM ICD-9-CM   1. Right knee pain, unspecified chronicity  M25.561 719.46   2.  Osteoarthritis of right knee, unspecified osteoarthritis type  M17.11 715.96 HPI:  The patient is here with a chief complaint of right knee pain, throbbing, burning pain. Pain is 7/10. X-rays are positive for severe OA. Assessment/Plan:  Plan will be for right knee cortisone injection. We will see the patient back as needed and go from there. As part of continued conservative pain management options the patient was advised to utilize Tylenol or OTC NSAIDS as long as it is not medically contraindicated. Return to Office: Follow-up and Dispositions    Return if symptoms worsen or fail to improve. Administrations This Visit       lidocaine (XYLOCAINE) 10 mg/mL (1 %) injection 9 mL       Admin Date  01/17/2023 Action  Given Dose  9 mL Route  Other Administered By  Hoang Rojas LPN              triamcinolone acetonide (KENALOG-40) 40 mg/mL injection 40 mg       Admin Date  01/17/2023 Action  Given Dose  40 mg Route  Intra artICUlar Administered By  Hoang Rojas LPN                   Scribed by Yuli Brink LPN as dictated by [facility] RESPONSE [place] MOUNIKA Coleman MD.  Documentation, performed by, True and Accepted Nino Coleman MD

## 2023-01-17 NOTE — PATIENT INSTRUCTIONS

## 2023-01-17 NOTE — LETTER
Ziggy Lint   1945   [id number]       1/17/2023       I hereby authorize and direct Nino Charlton MD, Tabitha Hurst, and whomever he may designate as his associate to perform upon myself the following procedure:    Injection of: Kenalog, Supartz, Euflexxa, Orthovisc in the Right/Left ____________________. If any unforeseen condition arises in the course of the procedure, I further authorize him and his associated and/or assistant(s) to do whatever he/she deems advisable. The nature, purpose, benefits, risks, side effects, likelihood of achieving goals, and potential problems that might occur during recuperation, risks for not receiving the proposed care, treatment and services and alternatives of the procedure have been fully explained to me by my physician including, but not limited to:    Swelling, joint pain, skin pigment changes, worsening of condition, and failure to improve. I acknowledge that no guarantee or assurance has been made to me as to the results that may be obtained or the likelihood of success.                 _______________________________________     Signature of patient or authorized representative                [employer] [facility] fax: [phone]

## 2023-01-17 NOTE — LETTER
1/18/2023    Patient: Shanique Hernadez   YOB: 1945   Date of Visit: 1/17/2023     Randal Barraza MD  [address] [address]  Via Fax: [phone]    Dear Randal Barraza MD,      Thank you for referring Ms. Mark Matthews to [address] [place] [address] for evaluation. My notes for this consultation are attached. If you have questions, please do not hesitate to call me. I look forward to following your patient along with you.       Sincerely,    Christina Hernandez MD

## 2023-02-07 ENCOUNTER — TRANSCRIBE ORDER (OUTPATIENT)
Dept: SCHEDULING | Age: 78
End: 2023-02-07

## 2023-02-07 DIAGNOSIS — I34.1 MITRAL VALVE PROLAPSE: ICD-10-CM

## 2023-02-07 DIAGNOSIS — E55.9 VITAMIN D DEFICIENCY: ICD-10-CM

## 2023-02-07 DIAGNOSIS — Z78.0 POSTMENOPAUSAL: Primary | ICD-10-CM

## 2023-02-09 ENCOUNTER — TRANSCRIBE ORDERS (OUTPATIENT)
Facility: HOSPITAL | Age: 78
End: 2023-02-09

## 2023-02-09 DIAGNOSIS — I34.1 MITRAL VALVE PROLAPSE: Primary | ICD-10-CM

## 2023-02-09 DIAGNOSIS — E55.9 VITAMIN D DEFICIENCY: ICD-10-CM

## 2023-02-09 DIAGNOSIS — Z78.0 POST-MENOPAUSAL: ICD-10-CM

## 2023-02-23 DIAGNOSIS — E55.9 VITAMIN D DEFICIENCY: ICD-10-CM

## 2023-02-23 DIAGNOSIS — Z78.0 POSTMENOPAUSAL: Primary | ICD-10-CM

## 2023-03-02 ENCOUNTER — HOSPITAL ENCOUNTER (OUTPATIENT)
Age: 78
End: 2023-03-02
Payer: MEDICARE

## 2023-03-02 ENCOUNTER — HOSPITAL ENCOUNTER (OUTPATIENT)
Age: 78
Discharge: HOME | End: 2023-03-04
Payer: MEDICARE

## 2023-03-02 VITALS
BODY MASS INDEX: 33.59 KG/M2 | SYSTOLIC BLOOD PRESSURE: 167 MMHG | WEIGHT: 214 LBS | DIASTOLIC BLOOD PRESSURE: 86 MMHG | HEIGHT: 67 IN

## 2023-03-02 DIAGNOSIS — I34.1 MITRAL VALVE PROLAPSE: ICD-10-CM

## 2023-03-02 DIAGNOSIS — E55.9 VITAMIN D DEFICIENCY: ICD-10-CM

## 2023-03-02 DIAGNOSIS — Z78.0 POST-MENOPAUSAL: ICD-10-CM

## 2023-03-02 LAB
ECHO AO ASC DIAM: 3.3 CM
ECHO AO ASCENDING AORTA INDEX: 1.59 CM/M2
ECHO AO ROOT DIAM: 3.4 CM
ECHO AO ROOT INDEX: 1.63 CM/M2
ECHO AR MAX VEL PISA: 4.1 M/S
ECHO AV AREA PEAK VELOCITY: 2.9 CM2
ECHO AV AREA VTI: 2.9 CM2
ECHO AV AREA/BSA PEAK VELOCITY: 1.4 CM2/M2
ECHO AV AREA/BSA VTI: 1.4 CM2/M2
ECHO AV MEAN GRADIENT: 5 MMHG
ECHO AV MEAN VELOCITY: 1 M/S
ECHO AV PEAK GRADIENT: 10 MMHG
ECHO AV PEAK VELOCITY: 1.6 M/S
ECHO AV REGURGITANT PHT: 988 MILLISECOND
ECHO AV VELOCITY RATIO: 0.75
ECHO AV VTI: 39.9 CM
ECHO BSA: 2.14 M2
ECHO EST RA PRESSURE: 3 MMHG
ECHO LA DIAMETER INDEX: 2.02 CM/M2
ECHO LA DIAMETER: 4.2 CM
ECHO LA TO AORTIC ROOT RATIO: 1.24
ECHO LA VOL 2C: 68 ML (ref 22–52)
ECHO LA VOL 2C: 70 ML (ref 22–52)
ECHO LA VOL 4C: 45 ML (ref 22–52)
ECHO LA VOL 4C: 49 ML (ref 22–52)
ECHO LA VOL BP: 56 ML (ref 22–52)
ECHO LA VOL/BSA BIPLANE: 27 ML/M2 (ref 16–34)
ECHO LA VOLUME AREA LENGTH: 59 ML
ECHO LA VOLUME INDEX AREA LENGTH: 28 ML/M2 (ref 16–34)
ECHO LV E' LATERAL VELOCITY: 6 CM/S
ECHO LV E' SEPTAL VELOCITY: 8 CM/S
ECHO LV EDV A4C: 96 ML
ECHO LV EDV INDEX A4C: 46 ML/M2
ECHO LV EJECTION FRACTION A4C: 74 %
ECHO LV ESV A4C: 25 ML
ECHO LV ESV INDEX A4C: 12 ML/M2
ECHO LV FRACTIONAL SHORTENING: 35 % (ref 28–44)
ECHO LV INTERNAL DIMENSION DIASTOLE INDEX: 2.5 CM/M2
ECHO LV INTERNAL DIMENSION DIASTOLIC: 5.2 CM (ref 3.9–5.3)
ECHO LV INTERNAL DIMENSION SYSTOLIC INDEX: 1.63 CM/M2
ECHO LV INTERNAL DIMENSION SYSTOLIC: 3.4 CM
ECHO LV IVSD: 1.2 CM (ref 0.6–0.9)
ECHO LV MASS 2D: 263.4 G (ref 67–162)
ECHO LV MASS INDEX 2D: 126.7 G/M2 (ref 43–95)
ECHO LV POSTERIOR WALL DIASTOLIC: 1.3 CM (ref 0.6–0.9)
ECHO LV RELATIVE WALL THICKNESS RATIO: 0.5
ECHO LVOT AREA: 4.2 CM2
ECHO LVOT AV VTI INDEX: 0.74
ECHO LVOT DIAM: 2.3 CM
ECHO LVOT MEAN GRADIENT: 3 MMHG
ECHO LVOT PEAK GRADIENT: 6 MMHG
ECHO LVOT PEAK VELOCITY: 1.2 M/S
ECHO LVOT STROKE VOLUME INDEX: 58.7 ML/M2
ECHO LVOT SV: 122.1 ML
ECHO LVOT VTI: 29.4 CM
ECHO MV A VELOCITY: 1.07 M/S
ECHO MV AREA VTI: 3 CM2
ECHO MV E DECELERATION TIME (DT): 270.8 MS
ECHO MV E VELOCITY: 0.85 M/S
ECHO MV E/A RATIO: 0.79
ECHO MV E/E' LATERAL: 14.17
ECHO MV E/E' RATIO (AVERAGED): 12.4
ECHO MV E/E' SEPTAL: 10.63
ECHO MV LVOT VTI INDEX: 1.37
ECHO MV MAX VELOCITY: 1.1 M/S
ECHO MV MEAN GRADIENT: 2 MMHG
ECHO MV MEAN VELOCITY: 0.6 M/S
ECHO MV PEAK GRADIENT: 4 MMHG
ECHO MV VTI: 40.2 CM
ECHO PV MAX VELOCITY: 1 M/S
ECHO PV PEAK GRADIENT: 4 MMHG
ECHO RA VOLUME BIPLANE METHOD OF DISKS: 35 ML
ECHO RA VOLUME INDEX BP: 17 ML/M2
ECHO RA VOLUME: 35 ML
ECHO RA VOLUME: 35 ML
ECHO RIGHT VENTRICULAR SYSTOLIC PRESSURE (RVSP): 25 MMHG
ECHO RV INTERNAL DIMENSION: 2.6 CM
ECHO RV TAPSE: 2 CM (ref 1.7–?)
ECHO TV REGURGITANT MAX VELOCITY: 2.32 M/S
ECHO TV REGURGITANT PEAK GRADIENT: 22 MMHG
LV EF: 63 %
LVEF MODALITY: ABNORMAL

## 2023-03-02 PROCEDURE — 77080 DXA BONE DENSITY AXIAL: CPT

## 2023-03-02 PROCEDURE — 93306 TTE W/DOPPLER COMPLETE: CPT

## 2023-03-14 ENCOUNTER — HOSPITAL ENCOUNTER (OUTPATIENT)
Age: 78
Discharge: HOME | End: 2023-03-17
Payer: MEDICARE

## 2023-03-14 DIAGNOSIS — N39.0 RECURRENT UTI: ICD-10-CM

## 2023-03-14 PROCEDURE — 76770 US EXAM ABDO BACK WALL COMP: CPT

## 2023-05-17 ENCOUNTER — HOSPITAL ENCOUNTER (OUTPATIENT)
Facility: HOSPITAL | Age: 78
Discharge: HOME | End: 2023-05-20
Payer: MEDICARE

## 2023-05-17 DIAGNOSIS — N39.0 RECURRENT UTI: ICD-10-CM

## 2023-05-17 DIAGNOSIS — N13.30 HYDRONEPHROSIS OF LEFT KIDNEY: ICD-10-CM

## 2023-05-17 LAB — CREAT UR-MCNC: 0.9 MG/DL (ref 0.6–1.3)

## 2023-05-17 PROCEDURE — 74178 CT ABD&PLV WO CNTR FLWD CNTR: CPT | Performed by: STUDENT IN AN ORGANIZED HEALTH CARE EDUCATION/TRAINING PROGRAM

## 2023-05-17 PROCEDURE — 6360000004 HC RX CONTRAST MEDICATION: Performed by: STUDENT IN AN ORGANIZED HEALTH CARE EDUCATION/TRAINING PROGRAM

## 2023-05-17 PROCEDURE — 82565 ASSAY OF CREATININE: CPT

## 2023-05-17 RX ADMIN — IOPAMIDOL 100 ML: 755 INJECTION, SOLUTION INTRAVENOUS at 09:38

## 2023-06-28 ENCOUNTER — HOSPITAL ENCOUNTER (OUTPATIENT)
Age: 78
Discharge: HOME | End: 2023-07-01
Payer: MEDICARE

## 2023-06-28 ENCOUNTER — TRANSCRIBE ORDERS (OUTPATIENT)
Age: 78
End: 2023-06-28

## 2023-06-28 DIAGNOSIS — N20.0 URIC ACID NEPHROLITHIASIS: Primary | ICD-10-CM

## 2023-06-28 DIAGNOSIS — N20.0 URIC ACID NEPHROLITHIASIS: ICD-10-CM

## 2023-06-28 LAB
ANION GAP SERPL CALC-SCNC: 7 MMOL/L (ref 3–18)
BASOPHILS # BLD: 0.1 K/UL (ref 0–0.1)
BASOPHILS NFR BLD: 1 % (ref 0–2)
BUN SERPL-MCNC: 16 MG/DL (ref 7–18)
BUN/CREAT SERPL: 18 (ref 12–20)
CA-I BLD-MCNC: 9.2 MG/DL (ref 8.5–10.1)
CHLORIDE SERPL-SCNC: 103 MMOL/L (ref 100–111)
CO2 SERPL-SCNC: 27 MMOL/L (ref 21–32)
CREAT SERPL-MCNC: 0.89 MG/DL (ref 0.6–1.3)
DIFFERENTIAL METHOD BLD: ABNORMAL
EOSINOPHIL # BLD: 0.4 K/UL (ref 0–0.4)
EOSINOPHIL NFR BLD: 6 % (ref 0–5)
ERYTHROCYTE [DISTWIDTH] IN BLOOD BY AUTOMATED COUNT: 13.6 % (ref 11.6–14.5)
GLUCOSE SERPL-MCNC: 111 MG/DL (ref 74–99)
HCT VFR BLD AUTO: 47.5 % (ref 35–45)
HGB BLD-MCNC: 15.3 G/DL (ref 12–16)
IMM GRANULOCYTES # BLD AUTO: 0.1 K/UL (ref 0–0.04)
IMM GRANULOCYTES NFR BLD AUTO: 1 % (ref 0–0.5)
LYMPHOCYTES # BLD: 1.2 K/UL (ref 0.9–3.6)
LYMPHOCYTES NFR BLD: 16 % (ref 21–52)
MCH RBC QN AUTO: 29.1 PG (ref 24–34)
MCHC RBC AUTO-ENTMCNC: 32.2 G/DL (ref 31–37)
MCV RBC AUTO: 90.3 FL (ref 78–100)
MONOCYTES # BLD: 0.5 K/UL (ref 0.05–1.2)
MONOCYTES NFR BLD: 7 % (ref 3–10)
NEUTS SEG # BLD: 4.9 K/UL (ref 1.8–8)
NEUTS SEG NFR BLD: 69 % (ref 40–73)
NRBC # BLD: 0 K/UL (ref 0–0.01)
NRBC BLD-RTO: 0 PER 100 WBC
PLATELET # BLD AUTO: 156 K/UL (ref 135–420)
PLATELET COMMENT: NORMAL
PMV BLD AUTO: 11.2 FL (ref 9.2–11.8)
POTASSIUM SERPL-SCNC: 4.2 MMOL/L (ref 3.5–5.5)
RBC # BLD AUTO: 5.26 M/UL (ref 4.2–5.3)
RBC MORPH BLD: ABNORMAL
SODIUM SERPL-SCNC: 137 MMOL/L (ref 136–145)
WBC # BLD AUTO: 7.2 K/UL (ref 4.6–13.2)

## 2023-06-28 PROCEDURE — 36415 COLL VENOUS BLD VENIPUNCTURE: CPT

## 2023-06-28 PROCEDURE — 87186 SC STD MICRODIL/AGAR DIL: CPT

## 2023-06-28 PROCEDURE — 87086 URINE CULTURE/COLONY COUNT: CPT

## 2023-06-28 PROCEDURE — 87077 CULTURE AEROBIC IDENTIFY: CPT

## 2023-06-28 PROCEDURE — 85025 COMPLETE CBC W/AUTO DIFF WBC: CPT

## 2023-06-28 PROCEDURE — 80048 BASIC METABOLIC PNL TOTAL CA: CPT

## 2023-06-30 RX ORDER — CEFDINIR 300 MG/1
300 CAPSULE ORAL 2 TIMES DAILY
Qty: 20 CAPSULE | Refills: 0 | Status: SHIPPED | OUTPATIENT
Start: 2023-06-30 | End: 2023-07-10

## 2023-07-01 LAB
BACTERIA SPEC CULT: ABNORMAL
BACTERIA SPEC CULT: ABNORMAL
COLONY COUNT, CNT: ABNORMAL
Lab: ABNORMAL

## 2023-07-28 ENCOUNTER — APPOINTMENT (OUTPATIENT)
Age: 78
End: 2023-07-28
Payer: MEDICARE

## 2023-07-28 ENCOUNTER — HOSPITAL ENCOUNTER (OUTPATIENT)
Age: 78
Setting detail: OBSERVATION
Discharge: ANOTHER HOSPITAL | End: 2023-07-28
Attending: INTERNAL MEDICINE | Admitting: INTERNAL MEDICINE
Payer: MEDICARE

## 2023-07-28 VITALS
WEIGHT: 217 LBS | TEMPERATURE: 99.1 F | HEART RATE: 79 BPM | BODY MASS INDEX: 32.89 KG/M2 | RESPIRATION RATE: 18 BRPM | OXYGEN SATURATION: 100 % | HEIGHT: 68 IN | DIASTOLIC BLOOD PRESSURE: 60 MMHG | SYSTOLIC BLOOD PRESSURE: 123 MMHG

## 2023-07-28 DIAGNOSIS — T82.898A INFILTRATION OF PERIPHERALLY INSERTED CENTRAL CATHETER (PICC), INITIAL ENCOUNTER (HCC): ICD-10-CM

## 2023-07-28 DIAGNOSIS — N39.0 URINARY TRACT INFECTION WITHOUT HEMATURIA, SITE UNSPECIFIED: ICD-10-CM

## 2023-07-28 DIAGNOSIS — A41.9 SEPTICEMIA (HCC): Primary | ICD-10-CM

## 2023-07-28 LAB
ALBUMIN SERPL-MCNC: 3.6 G/DL (ref 3.4–5)
ALBUMIN/GLOB SERPL: 1.2 (ref 0.8–1.7)
ALP SERPL-CCNC: 110 U/L (ref 45–117)
ALT SERPL-CCNC: 19 U/L (ref 13–56)
ANION GAP SERPL CALC-SCNC: 8 MMOL/L (ref 3–18)
APPEARANCE UR: CLEAR
AST SERPL W P-5'-P-CCNC: 14 U/L (ref 10–38)
BACTERIA URNS QL MICRO: ABNORMAL /HPF
BASOPHILS # BLD: 0.1 K/UL (ref 0–0.1)
BASOPHILS NFR BLD: 1 % (ref 0–2)
BILIRUB SERPL-MCNC: 0.5 MG/DL (ref 0.2–1)
BILIRUB UR QL: NEGATIVE
BUN SERPL-MCNC: 14 MG/DL (ref 7–18)
BUN/CREAT SERPL: 13 (ref 12–20)
CA-I BLD-MCNC: 8.9 MG/DL (ref 8.5–10.1)
CHLORIDE SERPL-SCNC: 97 MMOL/L (ref 100–111)
CHP ED QC CHECK: YES
CO2 SERPL-SCNC: 28 MMOL/L (ref 21–32)
COLOR UR: ABNORMAL
CREAT SERPL-MCNC: 1.05 MG/DL (ref 0.6–1.3)
DIFFERENTIAL METHOD BLD: ABNORMAL
EKG ATRIAL RATE: 106 BPM
EKG DIAGNOSIS: NORMAL
EKG P AXIS: 54 DEGREES
EKG P-R INTERVAL: 200 MS
EKG Q-T INTERVAL: 316 MS
EKG QRS DURATION: 74 MS
EKG QTC CALCULATION (BAZETT): 419 MS
EKG R AXIS: -23 DEGREES
EKG T AXIS: 24 DEGREES
EKG VENTRICULAR RATE: 106 BPM
EOSINOPHIL # BLD: 0.2 K/UL (ref 0–0.4)
EOSINOPHIL NFR BLD: 2 % (ref 0–5)
EPITH CASTS URNS QL MICRO: ABNORMAL /LPF (ref 0–20)
ERYTHROCYTE [DISTWIDTH] IN BLOOD BY AUTOMATED COUNT: 13.3 % (ref 11.6–14.5)
FLUAV AG NPH QL IA: NEGATIVE
FLUBV AG NOSE QL IA: NEGATIVE
GLOBULIN SER CALC-MCNC: 3 G/DL (ref 2–4)
GLUCOSE SERPL-MCNC: 141 MG/DL (ref 74–99)
GLUCOSE UR STRIP.AUTO-MCNC: NEGATIVE MG/DL
HCT VFR BLD AUTO: 43.1 % (ref 35–45)
HGB BLD-MCNC: 14.7 G/DL (ref 12–16)
HGB UR QL STRIP: NEGATIVE
IMM GRANULOCYTES # BLD AUTO: 0 K/UL (ref 0–0.04)
IMM GRANULOCYTES NFR BLD AUTO: 0 % (ref 0–0.5)
KETONES UR QL STRIP.AUTO: NEGATIVE MG/DL
LACTATE SERPL-SCNC: 1.5 MMOL/L (ref 0.4–2)
LEUKOCYTE ESTERASE UR QL STRIP.AUTO: ABNORMAL
LYMPHOCYTES # BLD: 0.4 K/UL (ref 0.9–3.6)
LYMPHOCYTES NFR BLD: 4 % (ref 21–52)
MCH RBC QN AUTO: 29.3 PG (ref 24–34)
MCHC RBC AUTO-ENTMCNC: 34.1 G/DL (ref 31–37)
MCV RBC AUTO: 86 FL (ref 78–100)
MONOCYTES # BLD: 0.8 K/UL (ref 0.05–1.2)
MONOCYTES NFR BLD: 8 % (ref 3–10)
NEUTS SEG # BLD: 8 K/UL (ref 1.8–8)
NEUTS SEG NFR BLD: 85 % (ref 40–73)
NITRITE UR QL STRIP.AUTO: POSITIVE
NRBC # BLD: 0 K/UL (ref 0–0.01)
NRBC BLD-RTO: 0 PER 100 WBC
PH UR STRIP: 6.5 (ref 5–8)
PLATELET # BLD AUTO: 156 K/UL (ref 135–420)
PMV BLD AUTO: 10.2 FL (ref 9.2–11.8)
POTASSIUM SERPL-SCNC: 4.3 MMOL/L (ref 3.5–5.5)
PROCALCITONIN SERPL-MCNC: <0.05 NG/ML
PROT SERPL-MCNC: 6.6 G/DL (ref 6.4–8.2)
PROT UR STRIP-MCNC: NEGATIVE MG/DL
RBC # BLD AUTO: 5.01 M/UL (ref 4.2–5.3)
RBC #/AREA URNS HPF: ABNORMAL /HPF (ref 0–2)
SODIUM SERPL-SCNC: 133 MMOL/L (ref 136–145)
SP GR UR REFRACTOMETRY: 1 (ref 1–1.03)
TROPONIN I SERPL HS-MCNC: 6 NG/L (ref 0–54)
UROBILINOGEN UR QL STRIP.AUTO: 0.2 EU/DL (ref 0.2–1)
WBC # BLD AUTO: 9.5 K/UL (ref 4.6–13.2)
WBC URNS QL MICRO: ABNORMAL /HPF (ref 0–4)

## 2023-07-28 PROCEDURE — 87040 BLOOD CULTURE FOR BACTERIA: CPT

## 2023-07-28 PROCEDURE — 74176 CT ABD & PELVIS W/O CONTRAST: CPT

## 2023-07-28 PROCEDURE — 6370000000 HC RX 637 (ALT 250 FOR IP): Performed by: NURSE PRACTITIONER

## 2023-07-28 PROCEDURE — 84484 ASSAY OF TROPONIN QUANT: CPT

## 2023-07-28 PROCEDURE — 71045 X-RAY EXAM CHEST 1 VIEW: CPT

## 2023-07-28 PROCEDURE — 87186 SC STD MICRODIL/AGAR DIL: CPT

## 2023-07-28 PROCEDURE — 6360000002 HC RX W HCPCS: Performed by: INTERNAL MEDICINE

## 2023-07-28 PROCEDURE — 81001 URINALYSIS AUTO W/SCOPE: CPT

## 2023-07-28 PROCEDURE — 96361 HYDRATE IV INFUSION ADD-ON: CPT

## 2023-07-28 PROCEDURE — 80053 COMPREHEN METABOLIC PANEL: CPT

## 2023-07-28 PROCEDURE — 96365 THER/PROPH/DIAG IV INF INIT: CPT

## 2023-07-28 PROCEDURE — 87804 INFLUENZA ASSAY W/OPTIC: CPT

## 2023-07-28 PROCEDURE — 96360 HYDRATION IV INFUSION INIT: CPT

## 2023-07-28 PROCEDURE — 83605 ASSAY OF LACTIC ACID: CPT

## 2023-07-28 PROCEDURE — 6360000002 HC RX W HCPCS: Performed by: NURSE PRACTITIONER

## 2023-07-28 PROCEDURE — 2580000003 HC RX 258: Performed by: INTERNAL MEDICINE

## 2023-07-28 PROCEDURE — G0378 HOSPITAL OBSERVATION PER HR: HCPCS

## 2023-07-28 PROCEDURE — 99285 EMERGENCY DEPT VISIT HI MDM: CPT

## 2023-07-28 PROCEDURE — 96366 THER/PROPH/DIAG IV INF ADDON: CPT

## 2023-07-28 PROCEDURE — 87077 CULTURE AEROBIC IDENTIFY: CPT

## 2023-07-28 PROCEDURE — 96375 TX/PRO/DX INJ NEW DRUG ADDON: CPT

## 2023-07-28 PROCEDURE — 84145 PROCALCITONIN (PCT): CPT

## 2023-07-28 PROCEDURE — 85025 COMPLETE CBC W/AUTO DIFF WBC: CPT

## 2023-07-28 PROCEDURE — 93005 ELECTROCARDIOGRAM TRACING: CPT | Performed by: INTERNAL MEDICINE

## 2023-07-28 RX ORDER — ENOXAPARIN SODIUM 100 MG/ML
40 INJECTION SUBCUTANEOUS DAILY
Status: DISCONTINUED | OUTPATIENT
Start: 2023-07-28 | End: 2023-07-28 | Stop reason: HOSPADM

## 2023-07-28 RX ORDER — 0.9 % SODIUM CHLORIDE 0.9 %
1000 INTRAVENOUS SOLUTION INTRAVENOUS ONCE
Status: DISCONTINUED | OUTPATIENT
Start: 2023-07-28 | End: 2023-07-28

## 2023-07-28 RX ORDER — LOSARTAN POTASSIUM 25 MG/1
50 TABLET ORAL DAILY
Status: DISCONTINUED | OUTPATIENT
Start: 2023-07-29 | End: 2023-07-28 | Stop reason: HOSPADM

## 2023-07-28 RX ORDER — SODIUM CHLORIDE 0.9 % (FLUSH) 0.9 %
5-40 SYRINGE (ML) INJECTION PRN
Status: DISCONTINUED | OUTPATIENT
Start: 2023-07-28 | End: 2023-07-28 | Stop reason: HOSPADM

## 2023-07-28 RX ORDER — SODIUM CHLORIDE 0.9 % (FLUSH) 0.9 %
5-40 SYRINGE (ML) INJECTION EVERY 12 HOURS SCHEDULED
Status: DISCONTINUED | OUTPATIENT
Start: 2023-07-28 | End: 2023-07-28 | Stop reason: HOSPADM

## 2023-07-28 RX ORDER — LEVOFLOXACIN 5 MG/ML
750 INJECTION, SOLUTION INTRAVENOUS EVERY 24 HOURS
Status: DISCONTINUED | OUTPATIENT
Start: 2023-07-29 | End: 2023-07-28 | Stop reason: HOSPADM

## 2023-07-28 RX ORDER — ATORVASTATIN CALCIUM 10 MG/1
10 TABLET, FILM COATED ORAL NIGHTLY
Status: DISCONTINUED | OUTPATIENT
Start: 2023-07-28 | End: 2023-07-28 | Stop reason: HOSPADM

## 2023-07-28 RX ORDER — LORAZEPAM 0.5 MG/1
0.5 TABLET ORAL DAILY PRN
Status: DISCONTINUED | OUTPATIENT
Start: 2023-07-28 | End: 2023-07-28 | Stop reason: HOSPADM

## 2023-07-28 RX ORDER — SODIUM CHLORIDE, SODIUM LACTATE, POTASSIUM CHLORIDE, AND CALCIUM CHLORIDE .6; .31; .03; .02 G/100ML; G/100ML; G/100ML; G/100ML
30 INJECTION, SOLUTION INTRAVENOUS ONCE
Status: COMPLETED | OUTPATIENT
Start: 2023-07-28 | End: 2023-07-28

## 2023-07-28 RX ORDER — POLYETHYLENE GLYCOL 3350 17 G/17G
17 POWDER, FOR SOLUTION ORAL DAILY PRN
Status: DISCONTINUED | OUTPATIENT
Start: 2023-07-28 | End: 2023-07-28 | Stop reason: HOSPADM

## 2023-07-28 RX ORDER — ACETAMINOPHEN 650 MG/1
650 SUPPOSITORY RECTAL EVERY 6 HOURS PRN
Status: DISCONTINUED | OUTPATIENT
Start: 2023-07-28 | End: 2023-07-28 | Stop reason: HOSPADM

## 2023-07-28 RX ORDER — HYDROCODONE POLISTIREX AND CHLORPHENIRAMINE POLISTIREX 10; 8 MG/5ML; MG/5ML
5 SUSPENSION, EXTENDED RELEASE ORAL DAILY PRN
Status: DISCONTINUED | OUTPATIENT
Start: 2023-07-28 | End: 2023-07-28 | Stop reason: HOSPADM

## 2023-07-28 RX ORDER — AMLODIPINE BESYLATE 5 MG/1
5 TABLET ORAL DAILY
Status: DISCONTINUED | OUTPATIENT
Start: 2023-07-28 | End: 2023-07-28

## 2023-07-28 RX ORDER — TROSPIUM CHLORIDE 20 MG/1
20 TABLET, FILM COATED ORAL
Status: DISCONTINUED | OUTPATIENT
Start: 2023-07-28 | End: 2023-07-28 | Stop reason: HOSPADM

## 2023-07-28 RX ORDER — ONDANSETRON 2 MG/ML
4 INJECTION INTRAMUSCULAR; INTRAVENOUS EVERY 6 HOURS PRN
Status: DISCONTINUED | OUTPATIENT
Start: 2023-07-28 | End: 2023-07-28 | Stop reason: HOSPADM

## 2023-07-28 RX ORDER — ACETAMINOPHEN 325 MG/1
650 TABLET ORAL EVERY 6 HOURS PRN
Status: DISCONTINUED | OUTPATIENT
Start: 2023-07-28 | End: 2023-07-28 | Stop reason: HOSPADM

## 2023-07-28 RX ORDER — AMLODIPINE BESYLATE 5 MG/1
5 TABLET ORAL DAILY
Status: DISCONTINUED | OUTPATIENT
Start: 2023-07-29 | End: 2023-07-28 | Stop reason: HOSPADM

## 2023-07-28 RX ORDER — PANTOPRAZOLE SODIUM 40 MG/1
40 TABLET, DELAYED RELEASE ORAL
Status: DISCONTINUED | OUTPATIENT
Start: 2023-07-28 | End: 2023-07-28 | Stop reason: HOSPADM

## 2023-07-28 RX ORDER — LOSARTAN POTASSIUM 25 MG/1
50 TABLET ORAL NIGHTLY
Status: DISCONTINUED | OUTPATIENT
Start: 2023-07-28 | End: 2023-07-28

## 2023-07-28 RX ORDER — ONDANSETRON 4 MG/1
4 TABLET, ORALLY DISINTEGRATING ORAL EVERY 8 HOURS PRN
Status: DISCONTINUED | OUTPATIENT
Start: 2023-07-28 | End: 2023-07-28 | Stop reason: HOSPADM

## 2023-07-28 RX ORDER — LEVOFLOXACIN 5 MG/ML
750 INJECTION, SOLUTION INTRAVENOUS
Status: COMPLETED | OUTPATIENT
Start: 2023-07-28 | End: 2023-07-28

## 2023-07-28 RX ORDER — HYDROCODONE POLISTIREX AND CHLORPHENIRAMINE POLISTIREX 10; 8 MG/5ML; MG/5ML
5 SUSPENSION, EXTENDED RELEASE ORAL DAILY PRN
COMMUNITY
Start: 2023-07-25

## 2023-07-28 RX ORDER — SODIUM CHLORIDE, SODIUM LACTATE, POTASSIUM CHLORIDE, AND CALCIUM CHLORIDE .6; .31; .03; .02 G/100ML; G/100ML; G/100ML; G/100ML
1000 INJECTION, SOLUTION INTRAVENOUS ONCE
Status: COMPLETED | OUTPATIENT
Start: 2023-07-28 | End: 2023-07-28

## 2023-07-28 RX ADMIN — ACETAMINOPHEN 650 MG: 325 TABLET ORAL at 19:41

## 2023-07-28 RX ADMIN — ONDANSETRON 4 MG: 2 INJECTION INTRAMUSCULAR; INTRAVENOUS at 18:04

## 2023-07-28 RX ADMIN — LEVOFLOXACIN 750 MG: 5 INJECTION, SOLUTION INTRAVENOUS at 08:21

## 2023-07-28 RX ADMIN — ACETAMINOPHEN 650 MG: 325 TABLET ORAL at 12:19

## 2023-07-28 RX ADMIN — SODIUM CHLORIDE, POTASSIUM CHLORIDE, SODIUM LACTATE AND CALCIUM CHLORIDE 1884 ML: 600; 310; 30; 20 INJECTION, SOLUTION INTRAVENOUS at 07:56

## 2023-07-28 RX ADMIN — TROSPIUM CHLORIDE 20 MG: 20 TABLET, FILM COATED ORAL at 17:11

## 2023-07-28 RX ADMIN — SODIUM CHLORIDE, POTASSIUM CHLORIDE, SODIUM LACTATE AND CALCIUM CHLORIDE 1000 ML: 600; 310; 30; 20 INJECTION, SOLUTION INTRAVENOUS at 07:43

## 2023-07-28 RX ADMIN — PANTOPRAZOLE SODIUM 40 MG: 40 TABLET, DELAYED RELEASE ORAL at 17:12

## 2023-07-28 RX ADMIN — ATORVASTATIN CALCIUM 10 MG: 10 TABLET, FILM COATED ORAL at 20:42

## 2023-07-28 RX ADMIN — LORAZEPAM 0.5 MG: 0.5 TABLET ORAL at 20:42

## 2023-07-28 ASSESSMENT — PAIN DESCRIPTION - LOCATION
LOCATION: ABDOMEN
LOCATION: BACK

## 2023-07-28 ASSESSMENT — PAIN SCALES - GENERAL
PAINLEVEL_OUTOF10: 0
PAINLEVEL_OUTOF10: 4
PAINLEVEL_OUTOF10: 5
PAINLEVEL_OUTOF10: 7
PAINLEVEL_OUTOF10: 8

## 2023-07-28 ASSESSMENT — ENCOUNTER SYMPTOMS
COUGH: 0
DIARRHEA: 0
TROUBLE SWALLOWING: 0
NAUSEA: 0
SHORTNESS OF BREATH: 0
VOMITING: 0
EYE REDNESS: 0
ABDOMINAL PAIN: 0

## 2023-07-28 ASSESSMENT — PAIN - FUNCTIONAL ASSESSMENT: PAIN_FUNCTIONAL_ASSESSMENT: 0-10

## 2023-07-28 ASSESSMENT — LIFESTYLE VARIABLES
HOW OFTEN DO YOU HAVE A DRINK CONTAINING ALCOHOL: NEVER
HOW MANY STANDARD DRINKS CONTAINING ALCOHOL DO YOU HAVE ON A TYPICAL DAY: PATIENT DOES NOT DRINK

## 2023-07-28 ASSESSMENT — PAIN DESCRIPTION - ORIENTATION: ORIENTATION: LEFT

## 2023-07-28 NOTE — ED NOTES
Patient voided prior to bladder scan. Patient voided 200 ml. Straight cath performed per MD order. 750 ml urine evacuated from urinary bladder during straight cath. Post straight cath bladder scan performed.  Residual urine in urinary bladder noted to be 50 ml     Farzana Avendano RN  07/28/23 4628

## 2023-07-28 NOTE — ASSESSMENT & PLAN NOTE
-likely secondary to complicated urinary tract infection, with history of E-Coli  -Lactic: 1.5  -LR 2.8 L given in the ED  -started Levaquin 750 ml in the ED, will continue Levaquin 750 mg daily  -PICC line in place, will removed  -keep MAP > than 65  -blood cultures pending  -CXR: no acute findings  -CT of ABD:  Left ureteral stent is noted in position with the proximal pigtail over prominent left renal pelvis similar to the prior study and the distal pigtail overlying urinary bladder.   -CBC in am

## 2023-07-28 NOTE — ED PROVIDER NOTES
[facility] EMERGENCY DEPT  EMERGENCY DEPARTMENT ENCOUNTER      Pt Name: Linus Schaefer  MRN: [id number]  [address] [facility] [place] 1945  Date of evaluation: 7/28/2023  Provider: Sander Rodriguez MD    CHIEF COMPLAINT       Chief Complaint   Patient presents with    Chest Pain    Gas         HISTORY OF PRESENT ILLNESS   (Location/Symptom, Timing/Onset, Context/Setting, Quality, Duration, Modifying Factors, Severity)  Note limiting factors. Linus Schaefer is a 70 y.o. female who presents to the emergency department      68-year-old female with history of breast cancer at age 41, asthma, C. difficile diarrhea, hypertension, hyperlipidemia, gastric bypass, anxiety that has a history of recurrent UTIs. Patient was noted to have a left renal stone and underwent laser lithotripsy and left ureteral stent placement on 7/6/2023 by Dr. Riya Doherty [[facility] She developed a urinary tract infection and on 7/26/2023 had a left PICC line placed and was being given IV Levaquin by home health and also by her . The  states that when the patient arrived with the PICC line this past Monday he noted that it had not been capped and when he tried to give her the Levaquin rather than 1-1/2 hours it took 4 hours to go in. Patient states that she was not feeling well yesterday and felt like she had urinary retention. Some dull discomfort in the suprapubic area. Last night she developed chills and then this morning she developed a fever and she was brought to the emergency room. She denies nausea vomiting diarrhea dysuria. She feels like she may not be passing as much urine as she used to since yesterday. PCP: Dr Virgilio Cornelius . 7/12/23 UC&S + E Coli S to Levaquin. The history is provided by the patient. That    Nursing Notes were reviewed. REVIEW OF SYSTEMS    (2-9 systems for level 4, 10 or more for level 5)     Review of Systems   Constitutional:  Positive for chills and fever. HENT:  Negative for trouble swallowing. Eyes:  Negative for redness. Respiratory:  Negative for cough and shortness of breath. Cardiovascular:  Negative for chest pain. Gastrointestinal:  Negative for abdominal pain, diarrhea, nausea and vomiting. Genitourinary:  Positive for difficulty urinating and pelvic pain. Negative for dysuria and flank pain. Skin:  Negative for rash. Neurological:  Negative for headaches. Psychiatric/Behavioral:  Negative for confusion. Except as noted above the remainder of the review of systems was reviewed and negative.        PAST MEDICAL HISTORY     Past Medical History:   Diagnosis Date    Asthma     Breast cancer [facility])     age 41    C. difficile diarrhea     GERD (gastroesophageal reflux disease)     HTN (hypertension)     Menopause     Stool color black          SURGICAL HISTORY       Past Surgical History:   Procedure Laterality Date    BREAST BIOPSY      COLONOSCOPY  2010    MASTECTOMY Right     age 41    UROLOGICAL SURGERY  07/06/2023    CYSTOSCOPY, LEFT URETEROSCOPY, LEFT RETROGRADE PYELOGRAM, LEFT STENT PLACEMENT; Dr Betsy Miller  07/26/2023    CYSTOSCOPY, LEFT RETROGRADE, LEFT URETEROSCOPY, LASER LITHOTRIPSY, JJ STENT EXCHANGE; Dr Naila Brooks       Previous Medications    AMLODIPINE (NORVASC) 5 MG TABLET        ASCORBIC ACID (VITAMIN C PO)    Take by mouth    CEFTRIAXONE (ROCEPHIN) 500 MG INJECTION    Inject 500 mg into the muscle every 24 hours for 2 days    CEFTRIAXONE (ROCEPHIN) 500 MG INJECTION    Inject 500 mg into the muscle every 24 hours for 1 day    CETIRIZINE (ZYRTEC) 10 MG TABLET    Take 1 tablet by mouth daily    CHOLECALCIFEROL 75 MCG (3000 UT) TABS    Take 2,000 Units by mouth daily    FOSFOMYCIN TROMETHAMINE (MONUROL) 3 G PACK    Take one packet mix with water for one dose every 3 days for a total of 3 doses    HYDROCHLOROTHIAZIDE (HYDRODIURIL) 12.5 MG TABLET        LORAZEPAM (ATIVAN) 0.5 MG TABLET    TAKE 1 TABLET BY MOUTH THREE TIMES DAILY

## 2023-07-28 NOTE — H&P
History and Physical    Subjective:     Petr Colby is a 70 y. o.elderly  female with a past medical history of recurrent UTIs and ureteral stent replacement and laser lithotripsy on 7/26 with outpatient IV antibiotics, hypertension, hypercholesteremia, and C. difficile 10 years ago, patient presented to the ED with a chief complaint of fever and chills that started overnight. Patient also complaining of lower back pain and lower abdominal pain, she denies chest pain, headaches, dizziness, nausea, vomiting, and shortness of breath. In the ED was found to have a temperature of 100.7, heart rate 103, positive for small leukocyte esterase and positive for nitrites, CT of the abdomen showed left ureteral stent in position with the proximal pigtail over the prominent left renal pelvis, and a noted tiny bubble of gas in the urinary bladder and fecal impaction chest x-ray show no acute findings, and EKG shown sinus tachycardia. In the ED sepsis protocol was initiated patient received 2.88 L of lactated Ringer's and was started on IV Levaquin. Due to patient urologist being an a care facility patient requested transfer, at this time patient has been accepted to [address] to Dr Zee Castillo, but currently there are no beds. Discussed case with ED provider, hospital medicine will admit the patient for further evaluation and treatment. Also noted to have a PICC line to her left upper arm-noted infiltrated, after discussing with attending Dr. Lyssa Peterson with the plan is to remove it and place a peripheral IV site. Patient assessed at the bedside, patient is alert and oriented, there is no acute distress noted. Patient agrees to admission for a diagnosis of sepsis in [address] [address] to complicated urinary tract infection, treatment to include IV antibiotics and await bed at [facility] to medical telemetry unit.     Past Medical History:   Diagnosis Date    Asthma     Breast cancer ([address]) 39 Minutes    Code Status: Full    Prophylaxis:  Lovenox    Electronically Signed : Tito Lopez [facility] Medicine Service    Please note that this dictation was completed with [employer], the computer voice recognition software. Quite often unanticipated grammatical, syntax, homophones, and other interpretive errors are inadvertently transcribed by the computer software. Please disregard these errors. Please excuse any errors that have escaped final proofreading. Thank you.

## 2023-07-28 NOTE — PROGRESS NOTES
1804 patient vomiting and complained of chest pain and nausea. PRN 4 mg IV Zofran given. Bp 137/91 pulse 91.  Temp 99.9

## 2023-07-28 NOTE — ED NOTES
MD aware of patients pre void and post void results via bladder scan. Prevoid:  430 ml,  Postvoid - 339 ml.       Trina Avendano RN  07/28/23 4627

## 2023-07-28 NOTE — ED NOTES
Spoke with Yeny Smalls at Transfer center requesting transfer of patient from [address] [place] [address] to [place]. MD aware.       Bianca Avendano RN  07/28/23 7269

## 2023-07-28 NOTE — ED TRIAGE NOTES
Patient states chest pain. She states:  \"I think it is gas\"  She c/o nausea and fever. States having subjective fever and c/o chills. Patient denies taking any tylenol or motrin. She states that she is currently undergoing abx therapy related to kidney stenting.

## 2023-07-28 NOTE — ED NOTES
TRANSFER - OUT REPORT:    Verbal report given to [address] [place] [address] Dr RN on Romana Burlington  being transferred to [address] [place] [address], Room 243 for routine progression of patient care       Report consisted of patient's Situation, Background, Assessment and   Recommendations(SBAR). Information from the following report(s) ED Encounter Summary, ED SBAR, MAR, and Cardiac Rhythm SR  was reviewed with the receiving nurse. Chamberlain Fall Assessment:    Presents to emergency department  because of falls (Syncope, seizure, or loss of consciousness): No  Age > 70: No  Altered Mental Status, Intoxication with alcohol or substance confusion (Disorientation, impaired judgment, poor safety awaremess, or inability to follow instructions): No  Impaired Mobility: Ambulates or transfers with assistive devices or assistance; Unable to ambulate or transer.: Yes             Lines:   Peripheral IV 07/28/23 Posterior;Right Hand (Active)   Site Assessment Clean, dry & intact 07/28/23 0741   Line Status Blood return noted 07/28/23 0741   Phlebitis Assessment No symptoms 07/28/23 0741   Infiltration Assessment 0 07/28/23 0741       Peripheral IV 07/28/23 Right Antecubital (Active)   Site Assessment Clean, dry & intact 07/28/23 0748   Line Status Blood return noted 07/28/23 0748   Phlebitis Assessment No symptoms 07/28/23 0748   Infiltration Assessment 0 07/28/23 0748        Opportunity for questions and clarification was provided. Patient transported with:  Lucia Nieto aware that PICC line is not to be used. PICC line does not provided blood return and is suspected of possible source of infection. Jenifer aware that patient has been accepted at [facility] and is awaiting bed assignment.              Darren Avendano RN  07/28/23 8839

## 2023-07-28 NOTE — ED NOTES
MD aware that PICC line will not return blood for blood culture ordered. PICC line flushes with ease, but no blood return.       Krystyna Avendano RN  07/28/23 3652

## 2023-07-28 NOTE — DISCHARGE SUMMARY
Hospitalist Discharge Summary     Patient ID:    Elodia Jonas  [id number]  22 y.o.  1945    Admit date: 7/28/2023    Discharge date : 7/28/2023    Chronic Diagnoses:  22    Final Diagnoses:   Principal Problem:    Sepsis ([address])  Resolved Problems:    * No resolved hospital problems. *      Reason for Hospitalization:    Elodia Jonas is a 70 y. o.elderly  female with a past medical history of recurrent UTIs and ureteral stent replacement and laser lithotripsy on 7/26 with outpatient IV antibiotics, hypertension, hypercholesteremia, and C. difficile 10 years ago, patient presented to the ED with a chief complaint of fever and chills that started overnight. Patient also complaining of lower back pain and lower abdominal pain, she denies chest pain, headaches, dizziness, nausea, vomiting, and shortness of breath. In the ED was found to have a temperature of 100.7, heart rate 103, positive for small leukocyte esterase and positive for nitrites, CT of the abdomen showed left ureteral stent in position with the proximal pigtail over the prominent left renal pelvis, and a noted tiny bubble of gas in the urinary bladder and fecal impaction chest x-ray show no acute findings, and EKG shown sinus tachycardia. In the ED sepsis protocol was initiated patient received 2.88 L of lactated Ringer's and was started on IV Levaquin. Due to patient urologist being an a care facility patient requested transfer, at this time patient has been accepted to [address] to [address] Prakash Christian, but currently there are no beds. Discussed case with ED provider, hospital medicine will admit the patient for further evaluation and treatment. Also noted to have a PICC line to her left upper arm-noted infiltrated, after discussing with attending Dr. Rupal Mclaughlin with the plan is to remove it and place a peripheral IV site.  Patient assessed at the bedside, patient is alert and oriented, there is no acute distress Procalcitonin    Collection Time: 07/28/23  7:40 AM   Result Value Ref Range    Procalcitonin <0.05 ng/mL   Troponin    Collection Time: 07/28/23  7:40 AM   Result Value Ref Range    Troponin, High Sensitivity 6 0 - 54 ng/L   POCT Glucose    Collection Time: 07/28/23  8:07 AM   Result Value Ref Range    QC OK? yes    Urinalysis    Collection Time: 07/28/23  8:15 AM   Result Value Ref Range    Color, UA Dark Yellow      Appearance Clear      Specific Gravity, UA 1.005 1.005 - 1.030      pH, Urine 6.5 5.0 - 8.0      Protein, UA Negative Negative mg/dL    Glucose, UA Negative Negative mg/dL    Ketones, Urine Negative Negative mg/dL    Bilirubin Urine Negative Negative      Blood, Urine Negative Negative      Urobilinogen, Urine 0.2 0.2 - 1.0 EU/dL    Nitrite, Urine Positive (A) Negative      Leukocyte Esterase, Urine Small (A) Negative     Urinalysis, Micro    Collection Time: 07/28/23  8:15 AM   Result Value Ref Range    WBC, UA 10-20 0 - 4 /hpf    RBC, UA 0-5 0 - 2 /hpf    Epithelial Cells UA Few 0 - 20 /lpf    BACTERIA, URINE 2+ (A) None /hpf   Rapid influenza A/B antigens    Collection Time: 07/28/23  8:30 AM    Specimen: Nasal Washing   Result Value Ref Range    Influenza A Ag Negative Negative      Influenza B Ag Negative Negative       XR CHEST PORTABLE   Final Result   No acute cardiopulmonary disease. CT ABDOMEN PELVIS WO CONTRAST Additional Contrast? None   Final Result      1. Left ureteral stent is noted in position with the proximal pigtail over   prominent left renal pelvis similar to the prior study and the distal pigtail   overlying urinary bladder. There is a tiny bubble of gas in the urinary bladder. 2. Fecal impaction.         Signed:  SELENE Diaz - CNP  7/28/2023  7:50 PM

## 2023-07-28 NOTE — PROGRESS NOTES
1350 - Received patient from ED. Patient transferred via stretcher by Jose Morin RN. Patient transferred to bed independently. She is alert and oriented x4. VSS. PIV to right hand and right ac both saline locked. PICC to left upper arm, received from report PICC not being used d/t no blood return. Patient placed on tele 2E#3, SR.  at bedside. 1425 - Patient ambulated to bathroom. Post void bladder scan 379. Patient aware to call after next void for bladder scan. 1700 - In on rounds. Scheduled meds administered. Patient stated she has voided x2. Reminded to call for post bladder scan. She verbalized understanding. [id number] - Made aware Arben Harris from the transfer center called with bed assignment. Patient will be transferred to room 324 at [facility]. She will call us back with ETA. Report needs to be called to [phone]. Patient made aware. She is sitting on the side of the bed eating dinner. She c/o being cold. Temp check 99.6. Room temp increased, warm blanket provided.

## 2023-07-28 NOTE — ED NOTES
MD at bedside re-evaluating patient.   MD aware that patient's temp is 100.2     Deirdre Osorio RN  07/28/23 8132

## 2023-07-28 NOTE — ED NOTES
Per Rachel Carpenter at the transfer center, patient has been placed on [address] [place] [address] for [facility]. MD made aware.       Alecia Avendano RN  07/28/23 3245

## 2023-07-29 NOTE — PROGRESS NOTES
2052 - EMTALA completed, writer attempted to call report to accepting nurse at [facility] with no answer x3. Rounded on pt, pt resting in bed with  at bedside. PRN Ativan administered with Tussinex(pt supplied) and HS medication. Awaiting [facility]. 2115 - Writer able to get in touch with Yousif Naik RN to give report on pt, SBAR reviewed. Provided time for questions and concerns. 2130 - Bart Adhikari arrived to pt room. Report given. Pt transferred over to stretcher. And is leaving floor.

## 2023-07-30 LAB
BACTERIA SPEC CULT: ABNORMAL
Lab: ABNORMAL
Lab: ABNORMAL

## 2023-08-01 LAB
BACTERIA SPEC CULT: ABNORMAL
GRAM STN SPEC: ABNORMAL
Lab: ABNORMAL
Lab: ABNORMAL

## 2023-08-17 ENCOUNTER — HOSPITAL ENCOUNTER (OUTPATIENT)
Age: 78
Discharge: HOME | End: 2023-08-17
Payer: MEDICARE

## 2023-08-17 DIAGNOSIS — N20.0 KIDNEY STONE: ICD-10-CM

## 2023-08-17 DIAGNOSIS — N39.0 RECURRENT UTI: ICD-10-CM

## 2023-08-17 PROCEDURE — 76770 US EXAM ABDO BACK WALL COMP: CPT

## 2023-10-19 ENCOUNTER — TRANSCRIBE ORDERS (OUTPATIENT)
Facility: HOSPITAL | Age: 78
End: 2023-10-19

## 2023-10-19 DIAGNOSIS — Z12.31 VISIT FOR SCREENING MAMMOGRAM: Primary | ICD-10-CM

## 2023-11-14 NOTE — MR AVS SNAPSHOT
Visit Information Date & Time Provider Department Dept. Phone Encounter #  
 3/14/2017  1:00 PM Randall Guzman MD [facility] [phone] [id number] Allergies as of 3/14/2017  Review Complete On: 3/14/2017 By: Randall Guzman MD  
  
 Severity Noted Reaction Type Reactions Azithromycin  09/15/2016    Diarrhea Erythromycin    Diarrhea GI distress Current Immunizations  Never Reviewed No immunizations on file. Not reviewed this visit You Were Diagnosed With   
  
 Codes Comments Internal and external hemorrhoids without complication    -  Primary ICD-10-CM: K64.4, K64.8 ICD-9-CM: 455.0, 455.3 Vitals BP Pulse Temp Resp Height(growth percentile) Weight(growth percentile) 126/82 82 98.2 °F (36.8 °C) (Oral) 20 5' 8\" (1.727 m) 197 lb (89.4 kg) BMI OB Status Smoking Status 29.95 kg/m2 Menopause Former Smoker BMI and BSA Data Body Mass Index Body Surface Area  
 29.95 kg/m 2 2.07 m 2 Your Updated Medication List  
  
   
This list is accurate as of: 3/14/17  2:20 PM.  Always use your most recent med list.  
  
  
  
  
 ALIGN 4 mg Cap Generic drug:  Bifidobacterium Infantis Take  by mouth. ATACAND 16 mg tablet Generic drug:  candesartan Take  by mouth daily. cetirizine 10 mg tablet Commonly known as:  ZYRTEC Take 10 mg by mouth daily. Cholecalciferol (Vitamin D3) 3,000 unit Tab Take 2,000 Units by mouth daily. hydroCHLOROthiazide 12.5 mg tablet Commonly known as:  HYDRODIURIL Take 12.5 mg by mouth daily. losartan 50 mg tablet Commonly known as:  COZAAR Take 50 mg by mouth daily. montelukast 10 mg tablet Commonly known as:  SINGULAIR Take 10 mg by mouth daily. OMEPRAZOLE PO Take 40 mg by mouth daily. raNITIdine hcl 300 mg Cap Take 300 mg by mouth daily. SYMBICORT IN Take  by inhalation. We Performed the Following RI DIAGNOSTIC ANOSCOPY [id number] CPT(R)] Patient Instructions If you have any questions or concerns about today's appointment, the verbal and/or written instructions you were given for follow up care, please call our office at [phone]. [facility] - [facility], [address] [place] [phone] office [phone] Introducing [facility]! [facility] introduces [employer] patient portal. Now you can access parts of your medical record, email your doctor's office, and request medication refills online. 1. In your internet browser, go to [email or web address] 2. Click on the First Time User? Click Here link in the Sign In box. You will see the New Member Sign Up page. 3. Enter your [employer] Access Code exactly as it appears below. You will not need to use this code after youve completed the sign-up process. If you do not sign up before the expiration date, you must request a new code. · [employer] Access Code: [email or web address] Expires: 6/12/2017 12:15 PM 
 
4. Enter the last four digits of your Social Security Number (xxxx) and Date of Birth (mm/dd/yyyy) as indicated and click Submit. You will be taken to the next sign-up page. 5. Create a [employer] ID. This will be your [employer] login ID and cannot be changed, so think of one that is secure and easy to remember. 6. Create a [employer] password. You can change your password at any time. 7. Enter your Password Reset Question and Answer. This can be used at a later time if you forget your password. 8. Enter your e-mail address. You will receive e-mail notification when new information is available in [address] 9. Click Sign Up. You can now view and download portions of your medical record. 10. Click the Download Summary menu link to download a portable copy of your medical information.  
 
If you have questions, please visit the Frequently Asked Questions section of the [employer]. Remember, [employer] is NOT to be used for urgent needs. For medical emergencies, dial 911. Now available from your iPhone and Android! Please provide this summary of care documentation to your next provider. Your primary care clinician is listed as Cyndie Tarango. If you have any questions after today's visit, please call [phone]. FEVER/PAIN

## 2023-11-21 ENCOUNTER — HOSPITAL ENCOUNTER (OUTPATIENT)
Age: 78
Discharge: HOME | End: 2023-11-24
Attending: INTERNAL MEDICINE
Payer: MEDICARE

## 2023-11-21 ENCOUNTER — HOSPITAL ENCOUNTER (OUTPATIENT)
Age: 78
Discharge: HOME | End: 2023-11-24
Payer: MEDICARE

## 2023-11-21 VITALS — BODY MASS INDEX: 32.96 KG/M2 | WEIGHT: 210 LBS | HEIGHT: 67 IN

## 2023-11-21 DIAGNOSIS — Z12.31 OTHER SCREENING MAMMOGRAM: ICD-10-CM

## 2023-11-21 DIAGNOSIS — Z00.00 ROUTINE GENERAL MEDICAL EXAMINATION AT A HEALTH CARE FACILITY: ICD-10-CM

## 2023-11-21 PROCEDURE — 77063 BREAST TOMOSYNTHESIS BI: CPT

## 2023-11-21 PROCEDURE — 71046 X-RAY EXAM CHEST 2 VIEWS: CPT

## 2023-12-11 ENCOUNTER — OFFICE VISIT (OUTPATIENT)
Age: 78
End: 2023-12-11

## 2023-12-11 DIAGNOSIS — M17.11 UNILATERAL PRIMARY OSTEOARTHRITIS, RIGHT KNEE: ICD-10-CM

## 2023-12-11 DIAGNOSIS — M25.561 RIGHT KNEE PAIN, UNSPECIFIED CHRONICITY: Primary | ICD-10-CM

## 2023-12-11 RX ORDER — TRIAMCINOLONE ACETONIDE 40 MG/ML
40 INJECTION, SUSPENSION INTRA-ARTICULAR; INTRAMUSCULAR ONCE
Status: COMPLETED | OUTPATIENT
Start: 2023-12-11 | End: 2023-12-11

## 2023-12-11 RX ORDER — LIDOCAINE HYDROCHLORIDE 10 MG/ML
9 INJECTION, SOLUTION INFILTRATION; PERINEURAL ONCE
Status: COMPLETED | OUTPATIENT
Start: 2023-12-11 | End: 2023-12-11

## 2023-12-11 RX ADMIN — LIDOCAINE HYDROCHLORIDE 9 ML: 10 INJECTION, SOLUTION INFILTRATION; PERINEURAL at 16:15

## 2023-12-11 RX ADMIN — TRIAMCINOLONE ACETONIDE 40 MG: 40 INJECTION, SUSPENSION INTRA-ARTICULAR; INTRAMUSCULAR at 16:15

## 2024-01-05 ENCOUNTER — HOSPITAL ENCOUNTER (OUTPATIENT)
Age: 79
End: 2024-01-05
Payer: MEDICARE

## 2024-01-05 ENCOUNTER — OFFICE VISIT (OUTPATIENT)
Age: 79
End: 2024-01-05
Payer: MEDICARE

## 2024-01-05 DIAGNOSIS — M17.11 UNILATERAL PRIMARY OSTEOARTHRITIS, RIGHT KNEE: ICD-10-CM

## 2024-01-05 DIAGNOSIS — M25.561 RIGHT KNEE PAIN, UNSPECIFIED CHRONICITY: Primary | ICD-10-CM

## 2024-01-05 DIAGNOSIS — Z01.818 PRE-OP TESTING: ICD-10-CM

## 2024-01-05 DIAGNOSIS — M25.561 RIGHT KNEE PAIN, UNSPECIFIED CHRONICITY: ICD-10-CM

## 2024-01-05 LAB
ANION GAP SERPL CALC-SCNC: 6 MMOL/L (ref 3–18)
BUN SERPL-MCNC: 25 MG/DL (ref 7–18)
BUN/CREAT SERPL: 20 (ref 12–20)
CA-I BLD-MCNC: 9.5 MG/DL (ref 8.5–10.1)
CHLORIDE SERPL-SCNC: 104 MMOL/L (ref 100–111)
CO2 SERPL-SCNC: 30 MMOL/L (ref 21–32)
CREAT SERPL-MCNC: 1.23 MG/DL (ref 0.6–1.3)
EKG ATRIAL RATE: 59 BPM
EKG DIAGNOSIS: NORMAL
EKG P AXIS: 61 DEGREES
EKG P-R INTERVAL: 206 MS
EKG Q-T INTERVAL: 410 MS
EKG QRS DURATION: 78 MS
EKG QTC CALCULATION (BAZETT): 405 MS
EKG R AXIS: 0 DEGREES
EKG T AXIS: 28 DEGREES
EKG VENTRICULAR RATE: 59 BPM
ERYTHROCYTE [DISTWIDTH] IN BLOOD BY AUTOMATED COUNT: 14.2 % (ref 11.6–14.5)
GLUCOSE SERPL-MCNC: 104 MG/DL (ref 74–99)
HCT VFR BLD AUTO: 48.6 % (ref 35–45)
HGB BLD-MCNC: 15.5 G/DL (ref 12–16)
MCH RBC QN AUTO: 28.6 PG (ref 24–34)
MCHC RBC AUTO-ENTMCNC: 31.9 G/DL (ref 31–37)
MCV RBC AUTO: 89.7 FL (ref 78–100)
NRBC # BLD: 0 K/UL (ref 0–0.01)
NRBC BLD-RTO: 0 PER 100 WBC
PLATELET # BLD AUTO: 234 K/UL (ref 135–420)
PMV BLD AUTO: 10.2 FL (ref 9.2–11.8)
POTASSIUM SERPL-SCNC: 3.8 MMOL/L (ref 3.5–5.5)
RBC # BLD AUTO: 5.42 M/UL (ref 4.2–5.3)
SODIUM SERPL-SCNC: 140 MMOL/L (ref 136–145)
WBC # BLD AUTO: 9.7 K/UL (ref 4.6–13.2)

## 2024-01-05 PROCEDURE — G8427 DOCREV CUR MEDS BY ELIG CLIN: HCPCS | Performed by: ORTHOPAEDIC SURGERY

## 2024-01-05 PROCEDURE — 1124F ACP DISCUSS-NO DSCNMKR DOCD: CPT | Performed by: ORTHOPAEDIC SURGERY

## 2024-01-05 PROCEDURE — 85027 COMPLETE CBC AUTOMATED: CPT

## 2024-01-05 PROCEDURE — 1090F PRES/ABSN URINE INCON ASSESS: CPT | Performed by: ORTHOPAEDIC SURGERY

## 2024-01-05 PROCEDURE — 1036F TOBACCO NON-USER: CPT | Performed by: ORTHOPAEDIC SURGERY

## 2024-01-05 PROCEDURE — 80048 BASIC METABOLIC PNL TOTAL CA: CPT

## 2024-01-05 PROCEDURE — 99204 OFFICE O/P NEW MOD 45 MIN: CPT | Performed by: ORTHOPAEDIC SURGERY

## 2024-01-05 PROCEDURE — 71046 X-RAY EXAM CHEST 2 VIEWS: CPT

## 2024-01-05 PROCEDURE — G8417 CALC BMI ABV UP PARAM F/U: HCPCS | Performed by: ORTHOPAEDIC SURGERY

## 2024-01-05 PROCEDURE — G8399 PT W/DXA RESULTS DOCUMENT: HCPCS | Performed by: ORTHOPAEDIC SURGERY

## 2024-01-05 PROCEDURE — G8484 FLU IMMUNIZE NO ADMIN: HCPCS | Performed by: ORTHOPAEDIC SURGERY

## 2024-01-05 PROCEDURE — 93005 ELECTROCARDIOGRAM TRACING: CPT

## 2024-01-05 NOTE — PROGRESS NOTES
Name: Marika Mckeon    : 1945     [facility]  [address], [facility] [address]  [facility] [address]  Dept: [phone]  Dept Fax: [phone]     Chief Complaint   Patient presents with    Knee Pain        LMP  (LMP Unknown)      Allergies   Allergen Reactions    Azithromycin Diarrhea    Bactrim [Sulfamethoxazole-Trimethoprim] Other (See Comments)     Unknown      Cefdinir     Erythromycin Diarrhea     GI distress    Keflex [Cephalexin] Diarrhea        Current Outpatient Medications   Medication Sig Dispense Refill    HYDROcodone-chlorpheniramine (TUSSIONEX) 10-8 MG/5ML SUER Take 5 mLs by mouth daily as needed.      tamsulosin (FLOMAX) 0.4 MG capsule Take 1 capsule by mouth daily 30 capsule 0    trospium (SANCTURA) 20 MG tablet       simvastatin (ZOCOR) 10 MG tablet TAKE 1 TABLET BY MOUTH ONCE DAILY IN THE EVENING FOR CHOLESTEROL      LORazepam (ATIVAN) 0.5 MG tablet TAKE 1 TABLET BY MOUTH THREE TIMES DAILY FOR 20 DAYS      hydroCHLOROthiazide (HYDRODIURIL) 12.5 MG tablet       amLODIPine (NORVASC) 5 MG tablet       Ascorbic Acid (VITAMIN C PO) Take by mouth      cetirizine (ZYRTEC) 10 MG tablet Take 1 tablet by mouth daily      Cholecalciferol 75 MCG (3000 UT) TABS Take 2,000 Units by mouth daily      losartan (COZAAR) 50 MG tablet Take 1 tablet by mouth daily      omeprazole (PRILOSEC) 40 MG delayed release capsule 2 times daily       No current facility-administered medications for this visit.      Patient Active Problem List   Diagnosis    Knee osteoarthritis    Internal and external hemorrhoids without complication    Solar degeneration    Seborrheic keratoses    Lentigines    Sepsis (HCC)      Family History   Problem Relation Age of Onset    Breast Cancer Maternal Aunt     Breast Cancer Niece     Breast Cancer Maternal Aunt     Breast Cancer Sister     Breast Cancer Mother     Heart Disease Father        Past Surgical 
none

## 2024-01-06 LAB
BACTERIA SPEC CULT: NORMAL
BACTERIA SPEC CULT: NORMAL
Lab: NORMAL

## 2024-01-10 ENCOUNTER — HOSPITAL ENCOUNTER (OUTPATIENT)
Age: 79
Setting detail: OUTPATIENT SURGERY
Discharge: HOME | End: 2024-01-10
Attending: OPHTHALMOLOGY | Admitting: OPHTHALMOLOGY
Payer: MEDICARE

## 2024-01-10 ENCOUNTER — ANESTHESIA EVENT (OUTPATIENT)
Age: 79
End: 2024-01-10
Payer: MEDICARE

## 2024-01-10 ENCOUNTER — ANESTHESIA (OUTPATIENT)
Age: 79
End: 2024-01-10
Payer: MEDICARE

## 2024-01-10 VITALS
DIASTOLIC BLOOD PRESSURE: 66 MMHG | TEMPERATURE: 97 F | RESPIRATION RATE: 18 BRPM | BODY MASS INDEX: 34.46 KG/M2 | OXYGEN SATURATION: 98 % | WEIGHT: 220 LBS | HEART RATE: 61 BPM | SYSTOLIC BLOOD PRESSURE: 120 MMHG

## 2024-01-10 PROCEDURE — V2632 POST CHMBR INTRAOCULAR LENS: HCPCS | Performed by: OPHTHALMOLOGY

## 2024-01-10 PROCEDURE — 2500000003 HC RX 250 WO HCPCS: Performed by: OPHTHALMOLOGY

## 2024-01-10 PROCEDURE — 3700000000 HC ANESTHESIA ATTENDED CARE: Performed by: OPHTHALMOLOGY

## 2024-01-10 PROCEDURE — 3600000012 HC SURGERY LEVEL 2 ADDTL 15MIN: Performed by: OPHTHALMOLOGY

## 2024-01-10 PROCEDURE — 6360000002 HC RX W HCPCS: Performed by: NURSE ANESTHETIST, CERTIFIED REGISTERED

## 2024-01-10 PROCEDURE — 6370000000 HC RX 637 (ALT 250 FOR IP): Performed by: OPHTHALMOLOGY

## 2024-01-10 PROCEDURE — 3600000002 HC SURGERY LEVEL 2 BASE: Performed by: OPHTHALMOLOGY

## 2024-01-10 PROCEDURE — 2709999900 HC NON-CHARGEABLE SUPPLY: Performed by: OPHTHALMOLOGY

## 2024-01-10 PROCEDURE — 6360000002 HC RX W HCPCS: Performed by: OPHTHALMOLOGY

## 2024-01-10 PROCEDURE — 7100000010 HC PHASE II RECOVERY - FIRST 15 MIN: Performed by: OPHTHALMOLOGY

## 2024-01-10 PROCEDURE — 3700000001 HC ADD 15 MINUTES (ANESTHESIA): Performed by: OPHTHALMOLOGY

## 2024-01-10 DEVICE — LENS IOL TECNIS EYHANCE DIB00U0220: Type: IMPLANTABLE DEVICE | Site: EYE | Status: FUNCTIONAL

## 2024-01-10 RX ORDER — SODIUM CHLORIDE 0.9 % (FLUSH) 0.9 %
5-40 SYRINGE (ML) INJECTION PRN
Status: DISCONTINUED | OUTPATIENT
Start: 2024-01-10 | End: 2024-01-10 | Stop reason: HOSPADM

## 2024-01-10 RX ORDER — TETRACAINE HYDROCHLORIDE 5 MG/ML
SOLUTION OPHTHALMIC PRN
Status: DISCONTINUED | OUTPATIENT
Start: 2024-01-10 | End: 2024-01-10 | Stop reason: ALTCHOICE

## 2024-01-10 RX ORDER — MOXIFLOXACIN 5 MG/ML
SOLUTION/ DROPS OPHTHALMIC PRN
Status: DISCONTINUED | OUTPATIENT
Start: 2024-01-10 | End: 2024-01-10 | Stop reason: ALTCHOICE

## 2024-01-10 RX ORDER — PROPOFOL 10 MG/ML
INJECTION, EMULSION INTRAVENOUS PRN
Status: DISCONTINUED | OUTPATIENT
Start: 2024-01-10 | End: 2024-01-10 | Stop reason: SDUPTHER

## 2024-01-10 RX ORDER — TRIAMCINOLONE ACETONIDE 40 MG/ML
INJECTION, SUSPENSION INTRA-ARTICULAR; INTRAMUSCULAR PRN
Status: DISCONTINUED | OUTPATIENT
Start: 2024-01-10 | End: 2024-01-10 | Stop reason: ALTCHOICE

## 2024-01-10 RX ORDER — SODIUM CHLORIDE 0.9 % (FLUSH) 0.9 %
5-40 SYRINGE (ML) INJECTION EVERY 12 HOURS SCHEDULED
Status: DISCONTINUED | OUTPATIENT
Start: 2024-01-10 | End: 2024-01-10 | Stop reason: HOSPADM

## 2024-01-10 RX ORDER — MIDAZOLAM HYDROCHLORIDE 1 MG/ML
INJECTION INTRAMUSCULAR; INTRAVENOUS PRN
Status: DISCONTINUED | OUTPATIENT
Start: 2024-01-10 | End: 2024-01-10 | Stop reason: SDUPTHER

## 2024-01-10 RX ORDER — SODIUM CHLORIDE 9 MG/ML
INJECTION, SOLUTION INTRAVENOUS PRN
Status: DISCONTINUED | OUTPATIENT
Start: 2024-01-10 | End: 2024-01-10 | Stop reason: HOSPADM

## 2024-01-10 RX ORDER — EPINEPHRINE 1 MG/ML(1)
AMPUL (ML) INJECTION PRN
Status: DISCONTINUED | OUTPATIENT
Start: 2024-01-10 | End: 2024-01-10 | Stop reason: ALTCHOICE

## 2024-01-10 RX ADMIN — Medication 1 EACH: at 06:59

## 2024-01-10 RX ADMIN — MIDAZOLAM 2 MG: 1 INJECTION INTRAMUSCULAR; INTRAVENOUS at 07:46

## 2024-01-10 RX ADMIN — PROPOFOL 30 MG: 10 INJECTION, EMULSION INTRAVENOUS at 07:58

## 2024-01-10 ASSESSMENT — PAIN - FUNCTIONAL ASSESSMENT
PAIN_FUNCTIONAL_ASSESSMENT: NONE - DENIES PAIN
PAIN_FUNCTIONAL_ASSESSMENT: NONE - DENIES PAIN

## 2024-01-10 NOTE — OP NOTE
bag and the anterior chamber were deepened with viscoelastic material. The bag was inspected and found to be free of any tears or defects. The intraocular lens implant listed below was placed into the capsular bag with good centration. Irrigation and aspiration was used to remove the residual viscoelastic material from the capsular bag and the anterior chamber and the incision sites were hydrated the BSS and cannula.  The incisions were rehydrated and inspected with a weck-giovany sponge and were found to be water tight. The eye was inspected and the anterior chamber was deep, the pupil round, and the lens was in good position. A 25G syringe was used to place 0.5cc 40mg/cc Triamcinolone subconjunctivally.  The lid speculum was then removed under visualization. Several drops of antibiotic eye drops were instilled into patient's operated eye. The operated eye was then covered with an eyeshield. The patient tolerated the procedure well and was transferred to recovery in good condition.    Implant Name Type Inv. Item Serial No.  Lot No. LRB No. Used Action   LENS IOL TECNIS EYHANCE [id number] - [id number]  LENS IOL TECNIS EYHANCE [id number] [facility] [employer]-  Right 1 Implanted       Danielle Navarro MD  01/10/24 8:20 AM

## 2024-01-10 NOTE — ANESTHESIA PRE PROCEDURE
Department of Anesthesiology  Preprocedure Note       Name:  Marika Mckeon   Age:  78 y.o.  :  1945                                          MRN:  [id number]         Date:  1/10/2024      Surgeon: Surgeon(s):  Danielle Navarro MD    Procedure: Procedure(s):  PHACO IOL OD    Medications prior to admission:   Prior to Admission medications    Medication Sig Start Date End Date Taking? Authorizing Provider   HYDROcodone-chlorpheniramine (TUSSIONEX) 10-8 MG/5ML SUER Take 5 mLs by mouth daily as needed. 23   Arturo Jeffers MD   trospium (SANCTURA) 20 MG tablet  23   Arturo Jeffers MD   simvastatin (ZOCOR) 10 MG tablet TAKE 1 TABLET BY MOUTH ONCE DAILY IN THE EVENING FOR CHOLESTEROL 23   Arturo Jeffers MD   LORazepam (ATIVAN) 0.5 MG tablet TAKE 1 TABLET BY MOUTH THREE TIMES DAILY FOR 20 DAYS 23   Arturo Jeffers MD   hydroCHLOROthiazide (HYDRODIURIL) 12.5 MG tablet  23   ProviderArturo MD   amLODIPine (NORVASC) 5 MG tablet  23   ProviderArturo MD   Ascorbic Acid (VITAMIN C PO) Take by mouth    ProviderArturo MD   cetirizine (ZYRTEC) 10 MG tablet Take 1 tablet by mouth daily    Automatic Reconciliation, Ar   Cholecalciferol 75 MCG (3000 UT) TABS Take 2,000 Units by mouth daily    Automatic Reconciliation, Ar   losartan (COZAAR) 50 MG tablet Take 1 tablet by mouth daily    Automatic Reconciliation, Ar   omeprazole (PRILOSEC) 40 MG delayed release capsule 2 times daily 21   Automatic Reconciliation, Ar       Current medications:    Current Facility-Administered Medications   Medication Dose Route Frequency Provider Last Rate Last Admin   • sodium chloride flush 0.9 % injection 5-40 mL  5-40 mL IntraVENous 2 times per day Danielle Navarro MD       • sodium chloride flush 0.9 % injection 5-40 mL  5-40 mL IntraVENous PRN Danielle Navarro MD       • 0.9 % sodium chloride infusion   IntraVENous PRN Danielle Navarro MD

## 2024-01-10 NOTE — ANESTHESIA POSTPROCEDURE EVALUATION
Department of Anesthesiology  Postprocedure Note    Patient: Marika Mckeon  MRN: [id number]  YOB: 1945  Date of evaluation: 1/10/2024    Procedure Summary       Date: 01/10/24 Room / Location: [facility] 01 / [facility] MAIN OR    Anesthesia Start: 0745 Anesthesia Stop: 0808    Procedure: PHACO IOL OD (Right: Eye) Diagnosis:       Nuclear sclerotic cataract of right eye      (Nuclear sclerotic cataract of right eye [H25.11])    Surgeons: Danielle Navarro MD Responsible Provider: Abraham Romeo APRN - CRNA    Anesthesia Type: MAC ASA Status: 2            Anesthesia Type: MAC    Divina Phase I:      Divina Phase II:      Anesthesia Post Evaluation    Patient location during evaluation: PACU  Patient participation: complete - patient participated  Level of consciousness: awake  Pain score: 0  Airway patency: patent  Nausea & Vomiting: no nausea  Cardiovascular status: blood pressure returned to baseline  Respiratory status: acceptable  Hydration status: euvolemic  Pain management: adequate    No notable events documented.

## 2024-01-10 NOTE — H&P
Day of Surgery H&P:  Marika Mckeon was seen and examined.  There have been no significant clinical changes since the completion of the exam in the office.  Pt continues to complain of persistent poor vision and/or glare in planned operative eye affecting ability to perform basic ADLs (such as reading or driving at night).        Past Medical History:   Diagnosis Date    Asthma     Breast cancer (HCC)     age 43    C. difficile diarrhea     GERD (gastroesophageal reflux disease)     HTN (hypertension)     Kidney stone     Menopause     Stool color black        Family Medical History: Non-contributory    HOME MED LIST:  Prior to Admission medications    Medication Sig Start Date End Date Taking? Authorizing Provider   HYDROcodone-chlorpheniramine (TUSSIONEX) 10-8 MG/5ML SUER Take 5 mLs by mouth daily as needed. 7/25/23   Arturo Jeffers MD   trospium (SANCTURA) 20 MG tablet  7/6/23   Arturo Jeffers MD   simvastatin (ZOCOR) 10 MG tablet TAKE 1 TABLET BY MOUTH ONCE DAILY IN THE EVENING FOR CHOLESTEROL 5/31/23   Arturo Jeffers MD   LORazepam (ATIVAN) 0.5 MG tablet TAKE 1 TABLET BY MOUTH THREE TIMES DAILY FOR 20 DAYS 5/31/23   Arturo Jeffers MD   hydroCHLOROthiazide (HYDRODIURIL) 12.5 MG tablet  5/31/23   Arturo Jeffers MD   amLODIPine (NORVASC) 5 MG tablet  5/31/23   Arturo Jeffers MD   Ascorbic Acid (VITAMIN C PO) Take by mouth    Arturo Jeffers MD   cetirizine (ZYRTEC) 10 MG tablet Take 1 tablet by mouth daily    Automatic Reconciliation, Ar   Cholecalciferol 75 MCG (3000 UT) TABS Take 2,000 Units by mouth daily    Automatic Reconciliation, Ar   losartan (COZAAR) 50 MG tablet Take 1 tablet by mouth daily    Automatic Reconciliation, Ar   omeprazole (PRILOSEC) 40 MG delayed release capsule 2 times daily 7/8/21   Automatic Reconciliation, Ar       Allergies   Allergen Reactions    Azithromycin Diarrhea    Bactrim [Sulfamethoxazole-Trimethoprim] Other (See Comments)

## 2024-01-10 NOTE — DISCHARGE INSTRUCTIONS
POST-OPERATIVE INSTRUCTIONS FOR CATARACT SURGERY     1. No heavy lifting (no more than 5 pounds). No Bending at waist and No strenuous activity for one (1) week.     2. DO NOT RUB YOUR EYE!!! Wear eye protection at all times when going outdoors (glasses, sunglasses,        ect) and wear shield while sleeping/napping for the first week after surgery.      3. DO NOT GET WATER IN YOUR EYES FOR THE NEXT 7 DAYS. You may gently clean your face and you        may shower, but don't put any pressure on the eye. Ladies, no eye makeup for one (1) week after surgery.        Do not swim or get into a hot tub or pool for three (3) weeks.     4. You may experience eye irritation, aching, itching and blurred vision for several days. You may use over the         Counter PRESERVATIVE FREE Refresh or Systane as needed.  Use Tylenol or Ibuprofen (Motrin) for         Discomfort but DO NOT RUB YOUR EYE .     5. Call your doctor with any increased pain, redness, nausea, vomiting, or worsening vision. Also call your doctor        with new flashes of light, many new floaters or a curtain/veil coming into your vision.                                               WASH YOUR HANDS BEFORE PUTTING IN YOUR DROPS.                                       ALLOW 5 MINUTES BETWEEN DIFFERENT DROPS.                          IF YOU HAVE ANY QUESTION OR CONCERNS:     DURING OFFICE HOURS CALL [phone]   OR AFTER HOURS / WEEKENDS CALL OR TEXT [phone]

## 2024-01-12 ENCOUNTER — TELEPHONE (OUTPATIENT)
Age: 79
End: 2024-01-12

## 2024-02-08 ENCOUNTER — OFFICE VISIT (OUTPATIENT)
Age: 79
End: 2024-02-08

## 2024-02-08 ENCOUNTER — TELEPHONE (OUTPATIENT)
Age: 79
End: 2024-02-08

## 2024-02-08 VITALS — WEIGHT: 220 LBS | HEIGHT: 67 IN | BODY MASS INDEX: 34.53 KG/M2

## 2024-02-08 DIAGNOSIS — M25.561 RIGHT KNEE PAIN, UNSPECIFIED CHRONICITY: Primary | ICD-10-CM

## 2024-02-08 DIAGNOSIS — M17.11 UNILATERAL PRIMARY OSTEOARTHRITIS, RIGHT KNEE: ICD-10-CM

## 2024-02-08 DIAGNOSIS — Z96.651 S/P TOTAL KNEE REPLACEMENT, RIGHT: Primary | ICD-10-CM

## 2024-02-08 RX ORDER — OXYCODONE HYDROCHLORIDE AND ACETAMINOPHEN 5; 325 MG/1; MG/1
1 TABLET ORAL
Qty: 30 TABLET | Refills: 0 | Status: SHIPPED | OUTPATIENT
Start: 2024-02-08 | End: 2024-02-16

## 2024-02-08 RX ORDER — ASPIRIN 325 MG
325 TABLET ORAL 2 TIMES DAILY
Qty: 60 TABLET | Refills: 0 | Status: SHIPPED | OUTPATIENT
Start: 2024-02-08

## 2024-02-08 RX ORDER — ONDANSETRON 8 MG/1
8 TABLET, ORALLY DISINTEGRATING ORAL EVERY 8 HOURS PRN
Qty: 20 TABLET | Refills: 0 | Status: SHIPPED | OUTPATIENT
Start: 2024-02-08

## 2024-02-08 RX ORDER — CLINDAMYCIN HYDROCHLORIDE 300 MG/1
300 CAPSULE ORAL EVERY 8 HOURS
Qty: 21 CAPSULE | Refills: 0 | Status: SHIPPED | OUTPATIENT
Start: 2024-02-08 | End: 2024-02-09 | Stop reason: ALTCHOICE

## 2024-02-08 NOTE — PROGRESS NOTES
Name: Marika Mckeon    : 1945        1/10/2024     8:10 AM 1/10/2024     8:07 AM 1/10/2024     6:50 AM 2023     9:53 AM 2023     2:25 PM 2023    10:34 AM 8/3/2023     1:17 PM   Ambulatory Bariatric Summary   Systolic 120 144 134       Diastolic 66 64 76       Pulse 61 61 62       Temp 97 °F (36.1 °C)  98 °F (36.7 °C)       Respirations 18 11 16       Weight - Scale   220 220 210 210 215   Height     1.702 m (5' 7\") 1.702 m (5' 7\") 1.715 m (5' 7.5\")   BMI   0 kg/m2 0 kg/m2 33 kg/m2 33 kg/m2 33.2 kg/m2   Weight - Scale   99.8 kg (220 lb) 99.8 kg (220 lb) 95.3 kg (210 lb) 95.3 kg (210 lb) 97.5 kg (215 lb)   BMI (Calculated)   0 0 33 33 33.2       There is no height or weight on file to calculate BMI.    Service Dept: [facility]  [address] [place] [address]  Dept: [phone]  Dept Fax: [phone]     Patient's Pharmacies:    [facility] [address] [place] [address] -  [phone] - F [phone]  [address] [place] [address]  Phone: [phone] Fax: [phone]    [id number] - [address] [place] [phone] - F [phone]  [address] [place] [address] [place] [address]  Phone: [phone] Fax: [phone]    [facility] #[id number] - [facility] [address] [phone] - F [phone] [address] [place] [address]  Phone: [phone] Fax: [phone]       No chief complaint on file.      HPI:  The patient is here with a chief complaint of right knee pain, throbbing, burning pain that is progressively getting worse. Pain is 2/10.  Previously diagnosed via x-ray with severe osteoarthritis of the right knee.  Patient has failed conservative treatment.     LMP  (LMP Unknown)    Allergies   Allergen Reactions    Azithromycin Diarrhea    Bactrim [Sulfamethoxazole-Trimethoprim] Other (See Comments)

## 2024-02-08 NOTE — TELEPHONE ENCOUNTER
Pt states she picked up her prescriptions.     She is concerned that Clindamycin was called in. She states it was discussed that Keflex would be called in.     Pt states that she also takes cough medicine which she has taken for 8 years. It has codeine  in it and she is curious if she is allowed to take both the cough syrup and the Percocet. The cough syrup is prescribed by a dr.    [facility] [address] [place] [address] - [address] [place] [address] -  [phone] - F [phone]  [address] [place] [address]  Phone: [phone]  Fax: [phone]

## 2024-02-09 RX ORDER — CEPHALEXIN 500 MG/1
500 CAPSULE ORAL EVERY 8 HOURS
Qty: 21 CAPSULE | Refills: 0 | Status: SHIPPED | OUTPATIENT
Start: 2024-02-09 | End: 2024-02-16

## 2024-02-14 ENCOUNTER — TELEPHONE (OUTPATIENT)
Age: 79
End: 2024-02-14

## 2024-02-14 NOTE — TELEPHONE ENCOUNTER
Pt had a RT TKR 02/13/2024    Pt states she has had a rough day yesterday. She states that during surgery and after surgery yesterday she was at a level 10 pain.    She states that yesterday at the surgery center bp spiked up and they did not let her leave until it was back normal. The pain was causing her bp to go.     She states that today is much better. She is icing and elevating and she is doing her home exercises.       She states her face is flushed and wants to know if this is normal.

## 2024-02-15 ENCOUNTER — HOSPITAL ENCOUNTER (OUTPATIENT)
Age: 79
Setting detail: RECURRING SERIES
Discharge: HOME | End: 2024-02-18
Payer: MEDICARE

## 2024-02-15 PROCEDURE — 97530 THERAPEUTIC ACTIVITIES: CPT

## 2024-02-15 PROCEDURE — 97110 THERAPEUTIC EXERCISES: CPT

## 2024-02-15 PROCEDURE — 97161 PT EVAL LOW COMPLEX 20 MIN: CPT

## 2024-02-15 NOTE — THERAPY EVALUATION
KNEE EVAL/ PT DAILY TREATMENT NOTE 10-18    Patient Name: Marika Mckeon  Date:2/15/2024  : 1945  [x]  Patient  Verified  Payor: MEDICARE / Plan: MEDICARE PART A AND B / Product Type: *No Product type* /    In time:1203  Out time:1255  Total Treatment Time (min): 52  Visit #: 1      Treatment Area: Presence of right artificial knee joint [Z96.651]    SUBJECTIVE  Pt had R TKA on 24. She states she had a lot of pain when she is in the car. Has been keeping up with pain meds to help with pain. Her friend was helping her with her exercises. Said she was bending her knee yesterday when her knee was bleeding.     Pt. Goals: get back to normal    Pain Level (0-10 scale): Current 8/10   Best:  4/10   Worst: 10/10  []constant []intermittent []improving []worsening []no change since onset      Past Medical History:   Diagnosis Date    Asthma     Breast cancer (HCC)     age 43    C. difficile diarrhea     GERD (gastroesophageal reflux disease)     HTN (hypertension)     Kidney stone     Menopause     Stool color black          Any medication changes, allergies to medications, adverse drug reactions, diagnosis change, or new procedure performed?: [x] No    [] Yes (see summary sheet for update)      OBJECTIVE/EXAMINATION  Vitals  - BP: 124/74 mm Hg  - HR: 69 bpm    ROM / Strength  [] Unable to assess                  AROM                         PROM                     Strength     Left Right Left Right Left Right   Hip Flexion     4     Extension          Abduction          Adduction         Knee Flexion 120 65  70 4+     Extension 0 -15  -12 5    Ankle Plantarflexion     5     Dorsiflexion  Inversion  Eversion     5      Gait:  [] Normal    [x] Abnormal    [x] Antalgic    [] NWB    Device: walker         Modality rationale:    Min Type Additional Details    [] Estim:  []Unatt       []IFC  []Premod                        []Other:  []w/ice   []w/heat  Position:  Location:    [] Estim: []Att    []TENS instruct  
with reciprocal pattern for improved overall Ind and function.  Pt will have overall RLE strength of at least 4+/5 for improved ability to perform activities such as STS, squatting, stair negotiation.  Pt will be Ind with HEP for self-management of signs and symptoms.       Frequency / Duration: Patient to be seen  2  times per week for 8  weeks:  Patient / Caregiver education and instruction: self care, activity modification, and exercises    Therapist Signature: Radha Bailey PT, DPT Date: 2/15/2024   Certification Period: 02/15/24 - 05/16/24  Time: 11:40 AM   ===========================================================================================  I certify that the above Physical Therapy Services are being furnished while the patient is under my care.  I agree with the treatment plan and certify that this therapy is necessary.    Physician Signature:        Date:       Time:     Please sign and return to [facility] PT or you may fax the signed copy to [phone]. Please call [phone] if more information requireank you.

## 2024-02-19 ENCOUNTER — TELEPHONE (OUTPATIENT)
Age: 79
End: 2024-02-19

## 2024-02-19 NOTE — TELEPHONE ENCOUNTER
Pt had a RT TKR on 02/13/2024    Pt called in this morning and stated that she is no longer taking the Keflex and she will not take it. She stopped taking it Saturday night, she did not take it at all on Sunday and is not going to take it today.     She states she had diarrhea from it.    She also stated that she was unable to reach anyone over the weekend.    She also stated she took the white stocking off. She states she removed everything but she put everything back on included the guaze.     She has a fuv tomorrow 02/21/2024

## 2024-02-20 ENCOUNTER — HOSPITAL ENCOUNTER (OUTPATIENT)
Age: 79
Setting detail: RECURRING SERIES
Discharge: HOME | End: 2024-02-23
Payer: MEDICARE

## 2024-02-20 ENCOUNTER — APPOINTMENT (OUTPATIENT)
Age: 79
End: 2024-02-20
Payer: MEDICARE

## 2024-02-20 ENCOUNTER — OFFICE VISIT (OUTPATIENT)
Age: 79
End: 2024-02-20

## 2024-02-20 DIAGNOSIS — M25.561 RIGHT KNEE PAIN, UNSPECIFIED CHRONICITY: Primary | ICD-10-CM

## 2024-02-20 DIAGNOSIS — Z96.651 STATUS POST TOTAL RIGHT KNEE REPLACEMENT: ICD-10-CM

## 2024-02-20 PROCEDURE — 99024 POSTOP FOLLOW-UP VISIT: CPT

## 2024-02-20 PROCEDURE — 97016 VASOPNEUMATIC DEVICE THERAPY: CPT

## 2024-02-20 PROCEDURE — 97110 THERAPEUTIC EXERCISES: CPT

## 2024-02-20 RX ORDER — OXYCODONE HYDROCHLORIDE AND ACETAMINOPHEN 5; 325 MG/1; MG/1
1 TABLET ORAL
Qty: 30 TABLET | Refills: 0 | Status: SHIPPED | OUTPATIENT
Start: 2024-02-20 | End: 2024-02-23 | Stop reason: SINTOL

## 2024-02-20 NOTE — PROGRESS NOTES
to improve the patient’s ability to return WNL with daily activities and achieve WNL for ROM and strength      Rationale:           With TE  TA   NR  GT   Misc Patient Education: [x] Review HEP    [] Progressed/Changed HEP based on:   [] positioning   [] body mechanics   [] transfers   [] heat/ice application          Pain Level (0-10 scale) post treatment: 2-3    ASSESSMENT/Changes in Function: Began session with warm up on stepper x5' (pt was unable to do 10' stated due to leg pain and feeling a little dizzy), followed with stretching and strengthening exercises per flow sheet which included seated for HSS 10x10\", seated marches 10x, LAQ 20x5\" with vc, followed by supine for heel slides 10x10\", quad sets 10x5\", SLR 10x with CGA assistance with R LE, there-ex standing in // bars for heel raises, HSC 2x10 each. Ended treatment with vaso 10'. Cont per poc . Pt requires encouragement and several rest breaks     Patient will continue to benefit from skilled PT services to modify and progress therapeutic interventions, analyze and address functional mobility deficits, analyze and address ROM deficits, analyze and address strength deficits, analyze and address soft tissue restrictions, analyze and cue for proper movement patterns, and analyze and modify for postural abnormalities to attain remaining goals.       [x]  See Plan of Care  []  See progress note/recertification  []  See Discharge Summary           PLAN  []  Upgrade activities as tolerated     [x]  Continue plan of care  []  Update interventions per flow sheet       []  Discharge due to:_  []  Other:_      Kaitlin Lyons PTA, LPTA 2/20/2024  1:55 PM

## 2024-02-20 NOTE — PATIENT INSTRUCTIONS
increase in swelling of the knee or leg, redness around the incision site, drainage, pus, or any oozing fluid from the incision please notify our office immediately.      If you develop a fever greater than 101.5 or have any significant trauma or falls, please call and notify our office.  A low-grade temperature below 101 can be normal for the first few weeks.     Bruising & pain around your thigh, leg, & foot are normal for up to 6-8 weeks.     You may experience a clicking or clunking noise in your knee, this is normal you now have an artificial implant in place that can cause these noises.     Moving forward if you have any type of DENTAL WORK/CLEANINGS or any invasive procedures that involve a risk of bleeding we recommend that you pre medicate one hour prior to these procedures with antibiotics. Please let the performing provider know that you have a total knee implant in place so they may pre medicate you accordingly. Please contact our office at least 72 hours prior to needing these medications if they do not. Please do not resume any routine  dental cleanings until you are at least 6 weeks out from your surgery.     At any time if you feel like you have stopped progressing in therapy or your knee feels worse, please call our office for an appointment to be seen.

## 2024-02-20 NOTE — PROGRESS NOTES
Name: Marika Mckeon    : 1945    10:14 AM 1/10/2024     8:10 AM 1/10/2024     8:07 AM 1/10/2024     6:50 AM 2023     9:53 AM 2023     2:25 PM 2023    10:34 AM   Ambulatory Bariatric Summary   Systolic  120 144 134      Diastolic  66 64 76      Pulse  61 61 62      Temp  97 °F (36.1 °C)  98 °F (36.7 °C)      Respirations  18 11 16      Weight - Scale 220   220 220 210 210   Height 1.702 m (5' 7\")     1.702 m (5' 7\") 1.702 m (5' 7\")   BMI 34.5 kg/m2   0 kg/m2 0 kg/m2 33 kg/m2 33 kg/m2   Weight - Scale 99.8 kg (220 lb)   99.8 kg (220 lb) 99.8 kg (220 lb) 95.3 kg (210 lb) 95.3 kg (210 lb)   BMI (Calculated) 34.5   0 0 33 33       There is no height or weight on file to calculate BMI.    Service Dept: [facility]OPA[facility] MEDICINE  [address] [place] [id number]  Dept: [phone]  Dept Fax: [phone]     Patient's Pharmacies:    [facility] [address] [place] [address] -  [phone] - F [phone]  [address] [place] [address]  Phone: [phone] Fax: [phone]    [id number] - [address] [place] [phone] - F [phone]  [facility] [address] [place] [address]  Phone: [phone] Fax: [phone]    [id number] - [facility] [address] [phone] - F [phone] [address] [facility] [address]  Phone: [phone] Fax: [phone]       Chief Complaint   Patient presents with    Post-Op Check    Knee Pain       HPI:  Patient presents for postop care following right TKA. Surgery was on 2024.  Ambulating good with a walker. Pain is a 8/10. Pain is controlled with current analgesics.  Medication(s) being used: narcotic analgesics including oxycodone/acetaminophen (Percocet, Tylox).  Patient initially started with pain control issues try to utilize only Tylenol or half a pain

## 2024-02-22 ENCOUNTER — HOSPITAL ENCOUNTER (OUTPATIENT)
Age: 79
Setting detail: RECURRING SERIES
Discharge: HOME | End: 2024-02-25
Payer: MEDICARE

## 2024-02-22 PROCEDURE — 97016 VASOPNEUMATIC DEVICE THERAPY: CPT

## 2024-02-22 PROCEDURE — 97110 THERAPEUTIC EXERCISES: CPT

## 2024-02-22 NOTE — PROGRESS NOTES
patient’s ability to achieve full AROM and strength while remaining pain free.         With TE  TA   NR  GT   Misc Patient Education: [x] Review HEP    [] Progressed/Changed HEP based on:   [] positioning   [] body mechanics   [] transfers   [] heat/ice application        Pain Level (0-10 scale) post treatment: 5    ASSESSMENT/Changes in Function: Session began with an active warm up 5 minutes x 2. B LE stretches completed on slant box to patient's tolerance. Continued with TKA protocol to increase R knee strength, range of motion and mobility. Pt completed exercises in parallel bars with standing rest breaks as needed. Focused on increasing quad recruitment, motor control, and strength via quad sets and LAQ. Pt reported one spell of feeling \"faint\" but that was quickly resolved with a standing rest break. Water was provided as requested. Plan to continue POC as able next visit working towards achieve short term goals next visit.     Patient will continue to benefit from skilled PT services to modify and progress therapeutic interventions, analyze and address functional mobility deficits, analyze and address ROM deficits, analyze and address strength deficits, analyze and address soft tissue restrictions, analyze and cue for proper movement patterns, analyze and modify for postural abnormalities, and analyze and address imbalance/dizziness to attain remaining goals.     [x]  See Plan of Care  []  See progress note/recertification  []  See Discharge Summary       PLAN  [x]  Upgrade activities as tolerated     [x]  Continue plan of care  []  Update interventions per flow sheet       []  Discharge due to:_  []  Other:_      JASE Hernandez  2/22/2024  1:52 PM

## 2024-02-23 ENCOUNTER — TELEPHONE (OUTPATIENT)
Age: 79
End: 2024-02-23

## 2024-02-23 DIAGNOSIS — Z96.651 STATUS POST TOTAL RIGHT KNEE REPLACEMENT: ICD-10-CM

## 2024-02-23 DIAGNOSIS — M25.561 RIGHT KNEE PAIN, UNSPECIFIED CHRONICITY: Primary | ICD-10-CM

## 2024-02-23 RX ORDER — HYDROMORPHONE HYDROCHLORIDE 4 MG/1
4 TABLET ORAL
Qty: 30 TABLET | Refills: 0 | Status: SHIPPED | OUTPATIENT
Start: 2024-02-23 | End: 2024-03-02

## 2024-02-24 NOTE — TELEPHONE ENCOUNTER
Dr Caldwell spoke with patient he has changed pain medication to Dilaudid. She will follow up with Dr Caldwlel on 02/26/24.

## 2024-02-26 ENCOUNTER — OFFICE VISIT (OUTPATIENT)
Age: 79
End: 2024-02-26

## 2024-02-26 DIAGNOSIS — M25.561 RIGHT KNEE PAIN, UNSPECIFIED CHRONICITY: Primary | ICD-10-CM

## 2024-02-26 PROCEDURE — 99024 POSTOP FOLLOW-UP VISIT: CPT | Performed by: ORTHOPAEDIC SURGERY

## 2024-02-26 NOTE — PROGRESS NOTES
Name: Marika Mckeon    : 1945     [facility]  [address] [facility], [address]  [facility] [address]  Dept: [phone]  Dept Fax: [phone]     Chief Complaint   Patient presents with    Post-Op Check    Knee Pain        LMP  (LMP Unknown)      Allergies   Allergen Reactions    Azithromycin Diarrhea    Bactrim [Sulfamethoxazole-Trimethoprim] Other (See Comments)     Unknown      Cefdinir     Erythromycin Diarrhea     GI distress        Current Outpatient Medications   Medication Sig Dispense Refill    HYDROmorphone (DILAUDID) 4 MG tablet Take 1 tablet by mouth every 4-6 hours as needed for Pain for up to 8 days. Max Daily Amount: 24 mg 30 tablet 0    ondansetron (ZOFRAN-ODT) 8 MG TBDP disintegrating tablet Place 1 tablet under the tongue every 8 hours as needed for Nausea or Vomiting 20 tablet 0    aspirin 325 MG tablet Take 1 tablet by mouth in the morning and at bedtime DO NOT START MEDICATION UNTIL 24 HOURS AFTER SURGERY 60 tablet 0    HYDROcodone-chlorpheniramine (TUSSIONEX) 10-8 MG/5ML SUER Take 5 mLs by mouth daily as needed.      trospium (SANCTURA) 20 MG tablet       simvastatin (ZOCOR) 10 MG tablet TAKE 1 TABLET BY MOUTH ONCE DAILY IN THE EVENING FOR CHOLESTEROL      LORazepam (ATIVAN) 0.5 MG tablet TAKE 1 TABLET BY MOUTH THREE TIMES DAILY FOR 20 DAYS      hydroCHLOROthiazide (HYDRODIURIL) 12.5 MG tablet       amLODIPine (NORVASC) 5 MG tablet       Ascorbic Acid (VITAMIN C PO) Take by mouth      cetirizine (ZYRTEC) 10 MG tablet Take 1 tablet by mouth daily      Cholecalciferol 75 MCG (3000 UT) TABS Take 2,000 Units by mouth daily      losartan (COZAAR) 50 MG tablet Take 1 tablet by mouth daily      omeprazole (PRILOSEC) 40 MG delayed release capsule 2 times daily       No current facility-administered medications for this visit.      Patient Active Problem List   Diagnosis    Knee osteoarthritis    Internal and

## 2024-02-26 NOTE — PATIENT INSTRUCTIONS
[employer] disclaims any warranty or liability for your use of this information.

## 2024-02-27 ENCOUNTER — HOSPITAL ENCOUNTER (OUTPATIENT)
Age: 79
Setting detail: RECURRING SERIES
Discharge: HOME | End: 2024-03-01
Payer: MEDICARE

## 2024-02-27 PROCEDURE — 97016 VASOPNEUMATIC DEVICE THERAPY: CPT

## 2024-02-27 PROCEDURE — 97110 THERAPEUTIC EXERCISES: CPT

## 2024-02-27 NOTE — PROGRESS NOTES
Bed: O35  Expected date:   Expected time:   Means of arrival:   Comments:  TL   interventions per flow sheet       []  Discharge due to:_  []  Other:_      Kaitlin Lyons PTA, LPTA 2/27/2024  2:19 PM

## 2024-02-29 ENCOUNTER — HOSPITAL ENCOUNTER (OUTPATIENT)
Age: 79
Setting detail: RECURRING SERIES
End: 2024-02-29
Payer: MEDICARE

## 2024-02-29 PROCEDURE — 97016 VASOPNEUMATIC DEVICE THERAPY: CPT

## 2024-02-29 PROCEDURE — 97110 THERAPEUTIC EXERCISES: CPT

## 2024-02-29 NOTE — PROGRESS NOTES
PT DAILY TREATMENT NOTE     Patient Name: Marika Mckeon  Date:2024  : 1945  [x]  Patient  Verified  Payor: MEDICARE / Plan: MEDICARE PART A AND B / Product Type: *No Product type* /    In time:1300  Out time:1401  Total Treatment Time (min): 61  Total Timed Codes (min): 61  1:1 Treatment Time (min): 51   Visit #: 5   Treatment Area: Presence of right artificial knee joint [Z96.651]    SUBJECTIVE  Pt states she had a pity party yesterday but better today. States she just stays hurting. States she is able to get in and out of the car better so that is improvement     Pain Level (0-10 scale): 5    Any medication changes, allergies to medications, adverse drug reactions, diagnosis change, or new procedure performed?: [x] No    [] Yes (see summary sheet for update)        OBJECTIVE  Modality rationale: decrease edema, decrease inflammation, decrease pain, and increase tissue extensibility to improve the patient’s ability to  optimally heal    Min Type Additional Details    [] Estim: []Att   []Unatt  []TENS instruct                 []IFC  []Premod []NMES                       []Other:  []w/US   []w/ice   []w/heat  Position:  Location:    []  Traction: [] Cervical       []Lumbar                       [] Prone          []Supine                       []Intermittent   []Continuous Lbs:  [] before manual  [] after manual    []  Ultrasound: []Continuous   [] Pulsed                           []1MHz   []3MHz Location:  W/cm2:    []  Iontophoresis with dexamethasone         Location: [] Take home patch   [] In clinic    []  Ice     []  heat  []  Ice massage Position:  Location:   10 [x]  Vasopneumatic Device Pressure: [x] lo [] med [] hi   Temp: [x] lo [] med [] hi   [x] Skin assessment post-treatment:  [x]intact [x]redness- no adverse reaction       []redness - adverse reaction:           51 min Therapeutic Exercise:  [x] See flow sheet :   Rationale: increase ROM, increase strength, improve coordination, and

## 2024-03-05 ENCOUNTER — HOSPITAL ENCOUNTER (OUTPATIENT)
Age: 79
Setting detail: RECURRING SERIES
Discharge: HOME | End: 2024-03-08
Payer: MEDICARE

## 2024-03-05 ENCOUNTER — TELEPHONE (OUTPATIENT)
Age: 79
End: 2024-03-05

## 2024-03-05 DIAGNOSIS — Z96.651 STATUS POST TOTAL RIGHT KNEE REPLACEMENT: Primary | ICD-10-CM

## 2024-03-05 DIAGNOSIS — M25.561 RIGHT KNEE PAIN, UNSPECIFIED CHRONICITY: ICD-10-CM

## 2024-03-05 PROCEDURE — 97116 GAIT TRAINING THERAPY: CPT

## 2024-03-05 PROCEDURE — 97016 VASOPNEUMATIC DEVICE THERAPY: CPT

## 2024-03-05 PROCEDURE — 97110 THERAPEUTIC EXERCISES: CPT

## 2024-03-05 RX ORDER — HYDROMORPHONE HYDROCHLORIDE 4 MG/1
4 TABLET ORAL
Qty: 30 TABLET | Refills: 0 | Status: SHIPPED | OUTPATIENT
Start: 2024-03-05 | End: 2024-03-13

## 2024-03-05 NOTE — TELEPHONE ENCOUNTER
Patient called in requesting pain medication refill.      Surgery:  rtkr on 2.13.24      Medication: dilaudid      Last Refill: 2.23.24      Pharmacy:   [facility] [address] [place] [address] [address]

## 2024-03-05 NOTE — PROGRESS NOTES
PT DAILY TREATMENT NOTE     Patient Name: Marika Mckeon  Date:3/5/2024  : 1945  [x]  Patient  Verified  Payor: MEDICARE / Plan: MEDICARE PART A AND B / Product Type: *No Product type* /    In time:1356  Out time:1507  Total Treatment Time (min): 71  Total Timed Codes (min): 61  1:1 Treatment Time (min): 61   Visit #: 6     Treatment Area: Presence of right artificial knee joint [Z96.651]    SUBJECTIVE  Pt enters clinic using SPC reporting less pain and stiffness in R knee. She states, \"I think I see the light at the end of the tunnel.'    Pain Level (0-10 scale): 5    Any medication changes, allergies to medications, adverse drug reactions, diagnosis change, or new procedure performed?: [x] No    [] Yes (see summary sheet for update)    OBJECTIVE  Modality rationale: decrease edema, decrease inflammation, and decrease pain to improve the patient’s ability to optimally heal.    Min Type Additional Details    [] Estim: []Att   []Unatt  []TENS instruct                 []IFC  []Premod   []NMES                       []Other:  []w/US   []w/ice   []w/heat  Position:  Location:    []  Traction: [] Cervical       []Lumbar                       [] Prone          []Supine                       []Intermittent   []Continuous Lbs:  [] before manual  [] after manual    []  Ultrasound: []Continuous   [] Pulsed                           []1MHz   []3MHz Location:  W/cm2:    []  Iontophoresis with dexamethasone         Location: [] Take home patch   [] In clinic    []  Ice     []  heat  []  Ice massage Position:  Location:   10 [x]  Vasopneumatic Device Pressure: [x] lo [] med [] hi   Temp: [] lo [x] med [] hi   [] Skin assessment post-treatment:  []intact []redness- no adverse reaction       []redness - adverse reaction:     42 min Therapeutic Exercise:  [x] See flow sheet :   Rationale: increase ROM, increase strength, improve coordination, improve balance, and increase proprioception to improve the patient’s

## 2024-03-07 ENCOUNTER — TELEPHONE (OUTPATIENT)
Age: 79
End: 2024-03-07

## 2024-03-07 ENCOUNTER — HOSPITAL ENCOUNTER (OUTPATIENT)
Age: 79
Setting detail: RECURRING SERIES
Discharge: HOME | End: 2024-03-10
Payer: MEDICARE

## 2024-03-07 PROCEDURE — 97110 THERAPEUTIC EXERCISES: CPT

## 2024-03-07 PROCEDURE — 97016 VASOPNEUMATIC DEVICE THERAPY: CPT

## 2024-03-07 NOTE — TELEPHONE ENCOUNTER
Pt was made aware to continue the Asprin 325mg for 4wks post op.    She was also made aware that it is not recommended to take the Aleve. She stated Dr. Caldwell informed her that it was okay to take Aleve as an Anti-Inflammatory.

## 2024-03-07 NOTE — PROGRESS NOTES
PT DAILY TREATMENT NOTE 8    Patient Name: Marika Mckeon  Date:3/7/2024  : 1945  [x]  Patient  Verified  Payor: MEDICARE / Plan: MEDICARE PART A AND B / Product Type: *No Product type* /    In time:1330  Out time:1425  Total Treatment Time (min): 55  Total Timed Codes (min): 55  1:1 Treatment Time (min): 45   Visit #: 7     Treatment Area: Presence of right artificial knee joint [Z96.651]    SUBJECTIVE  Pt states she is getting better. States she does work on her exercises at home but does not on the days after therapy.     Pain Level (0-10 scale): 2    Any medication changes, allergies to medications, adverse drug reactions, diagnosis change, or new procedure performed?: [x] No    [] Yes (see summary sheet for update)        OBJECTIVE  Modality rationale: decrease edema, decrease inflammation, decrease pain, and increase tissue extensibility to improve the patient’s ability to to optimally heal    Min Type Additional Details    [] Estim: []Att   []Unatt  []TENS instruct                 []IFC  []Premod []NMES                       []Other:  []w/US   []w/ice   []w/heat  Position:  Location:    []  Traction: [] Cervical       []Lumbar                       [] Prone          []Supine                       []Intermittent   []Continuous Lbs:  [] before manual  [] after manual    []  Ultrasound: []Continuous   [] Pulsed                           []1MHz   []3MHz Location:  W/cm2:    []  Iontophoresis with dexamethasone         Location: [] Take home patch   [] In clinic    []  Ice     []  heat  []  Ice massage Position:  Location:   10 [x]  Vasopneumatic Device Pressure: [x] lo [] med [] hi   Temp: [x] lo [] med [] hi   [x] Skin assessment post-treatment:  [x]intact [x]redness- no adverse reaction       []redness - adverse reaction:           45 min Therapeutic Exercise:  [x] See flow sheet :   Rationale: increase ROM, increase strength, improve coordination, and improve balance to improve the patient’s

## 2024-03-12 ENCOUNTER — HOSPITAL ENCOUNTER (OUTPATIENT)
Age: 79
Setting detail: RECURRING SERIES
Discharge: HOME | End: 2024-03-15
Payer: MEDICARE

## 2024-03-12 PROCEDURE — 97110 THERAPEUTIC EXERCISES: CPT

## 2024-03-12 PROCEDURE — 97016 VASOPNEUMATIC DEVICE THERAPY: CPT

## 2024-03-12 NOTE — PROGRESS NOTES
PT DAILY TREATMENT NOTE 8-    Patient Name: Marika Mckeon  Date:3/12/2024  : 1945  [x]  Patient  Verified  Payor: MEDICARE / Plan: MEDICARE PART A AND B / Product Type: *No Product type* /    In time:1248  Out time:1347  Total Treatment Time (min): 59  Total Timed Codes (min): 59  1:1 Treatment Time (min): 59   Visit #: 8     Treatment Area: Presence of right artificial knee joint [Z96.651]    SUBJECTIVE  Pt states she finds herself carrying her cane now more than using it but she still likes to have it with her     Pain Level (0-10 scale): 4    Any medication changes, allergies to medications, adverse drug reactions, diagnosis change, or new procedure performed?: [x] No    [] Yes (see summary sheet for update)        OBJECTIVE  Modality rationale: decrease edema, decrease inflammation, decrease pain, and increase tissue extensibility to improve the patient’s ability to optimally heal    Min Type Additional Details    [] Estim: []Att   []Unatt  []TENS instruct                 []IFC  []Premod []NMES                       []Other:  []w/US   []w/ice   []w/heat  Position:  Location:    []  Traction: [] Cervical       []Lumbar                       [] Prone          []Supine                       []Intermittent   []Continuous Lbs:  [] before manual  [] after manual    []  Ultrasound: []Continuous   [] Pulsed                           []1MHz   []3MHz Location:  W/cm2:    []  Iontophoresis with dexamethasone         Location: [] Take home patch   [] In clinic    []  Ice     []  heat  []  Ice massage Position:  Location:   10 [x]  Vasopneumatic Device Pressure: [x] lo [] med [] hi   Temp: [x] lo [] med [] hi   [x] Skin assessment post-treatment:  [x]intact [x]redness- no adverse reaction       []redness - adverse reaction:           49 min Therapeutic Exercise:  [x] See flow sheet :   Rationale: increase ROM, increase strength, and improve coordination to improve the patient’s ability to  complete housheold

## 2024-03-14 ENCOUNTER — APPOINTMENT (OUTPATIENT)
Age: 79
End: 2024-03-14
Payer: MEDICARE

## 2024-03-19 ENCOUNTER — OFFICE VISIT (OUTPATIENT)
Age: 79
End: 2024-03-19

## 2024-03-19 ENCOUNTER — HOSPITAL ENCOUNTER (OUTPATIENT)
Age: 79
Setting detail: RECURRING SERIES
Discharge: HOME | End: 2024-03-22
Payer: MEDICARE

## 2024-03-19 DIAGNOSIS — Z96.651 STATUS POST TOTAL RIGHT KNEE REPLACEMENT: Primary | ICD-10-CM

## 2024-03-19 DIAGNOSIS — M25.561 RIGHT KNEE PAIN, UNSPECIFIED CHRONICITY: ICD-10-CM

## 2024-03-19 PROCEDURE — 97016 VASOPNEUMATIC DEVICE THERAPY: CPT

## 2024-03-19 PROCEDURE — 99024 POSTOP FOLLOW-UP VISIT: CPT

## 2024-03-19 PROCEDURE — 97110 THERAPEUTIC EXERCISES: CPT

## 2024-03-19 RX ORDER — GABAPENTIN 100 MG/1
100 CAPSULE ORAL 3 TIMES DAILY
Qty: 90 CAPSULE | Refills: 0 | Status: SHIPPED | OUTPATIENT
Start: 2024-03-19 | End: 2024-04-18

## 2024-03-19 NOTE — PATIENT INSTRUCTIONS
different pace. Follow the steps below to get better as quickly as possible.  How can you care for yourself at home?  Activity    Rest when you feel tired. You may take a nap, but don't stay in bed all day. When you sit, use a chair with arms. You can use the arms to help you stand up.     Work with your physical therapist to find the best way to exercise. What you can do as your knee heals will depend on whether your new knee is cemented or uncemented. You may not be able to do certain things for a while if your new knee is uncemented.     After your knee has healed enough, you can do more strenuous activities with caution.  You can golf, but you may want to use a golf cart for some time. And don't wear shoes with spikes.  You can bike on a flat road or on a stationary bike. Talk to your doctor before biking uphill.  Your doctor may suggest that you stay away from activities that put stress on your knee. These include tennis, badminton, contact sports like football, jumping (such as in basketball), jogging, and running.  Avoid activities where you might fall.     Do not sit for more than 1 hour at a time. Get up and walk around for a while before you sit again. If you must sit for a long time, prop up your leg with a chair or footstool. This will help you avoid swelling.     Ask your doctor when you can drive again. It may take several weeks after knee replacement surgery before it's safe for you to drive.     When you get into a car, sit on the edge of the seat. Then pull in your legs, and turn to face the front.     You should be able to do many everyday activities 3 to 6 weeks after your surgery. You will probably need to take 4 to 16 weeks off from work. When you can go back to work depends on the type of work you do and how you feel.     Ask your doctor when it is okay for you to have sex.     For 12 weeks, do not lift anything heavier than 10 pounds and do not lift weights.   Diet    By the time you leave the

## 2024-03-19 NOTE — PROGRESS NOTES
PT DAILY TREATMENT NOTE 8    Patient Name: Marika Mckeon  Date:3/19/2024  : 1945  [x]  Patient  Verified  Payor: MEDICARE / Plan: MEDICARE PART A AND B / Product Type: *No Product type* /    In time:1027  Out time:1120  Total Treatment Time (min): 53  Total Timed Codes (min): 53  1:1 Treatment Time (min): 53   Visit #: 9     Treatment Area: Presence of right artificial knee joint [Z96.651]    SUBJECTIVE  Pt states she had not been feeling well due to medication had upset her system and they are changing that. States she continues to work on HEP now daily and walking along with her HEP.     Pain Level (0-10 scale): 4    Any medication changes, allergies to medications, adverse drug reactions, diagnosis change, or new procedure performed?: [x] No    [] Yes (see summary sheet for update)        OBJECTIVE  Modality rationale: decrease edema, decrease inflammation, decrease pain, and increase tissue extensibility to improve the patient’s ability to  optimally heal    Min Type Additional Details    [] Estim: []Att   []Unatt  []TENS instruct                 []IFC  []Premod []NMES                       []Other:  []w/US   []w/ice   []w/heat  Position:  Location:    []  Traction: [] Cervical       []Lumbar                       [] Prone          []Supine                       []Intermittent   []Continuous Lbs:  [] before manual  [] after manual    []  Ultrasound: []Continuous   [] Pulsed                           []1MHz   []3MHz Location:  W/cm2:    []  Iontophoresis with dexamethasone         Location: [] Take home patch   [] In clinic    []  Ice     []  heat  []  Ice massage Position:  Location:   10 []  Vasopneumatic Device Pressure: [x] lo [] med [] hi   Temp: [x] lo [] med [] hi   [x] Skin assessment post-treatment:  [x]intact [x]redness- no adverse reaction       []redness - adverse reaction:           53 min Therapeutic Exercise:  [x] See flow sheet :   Rationale: increase ROM, increase strength, improve

## 2024-03-19 NOTE — PROGRESS NOTES
Name: Marika Mckeon    : 1945    10:14 AM 1/10/2024     8:10 AM 1/10/2024     8:07 AM 1/10/2024     6:50 AM 2023     9:53 AM 2023     2:25 PM 2023    10:34 AM   Ambulatory Bariatric Summary   Systolic  120 144 134      Diastolic  66 64 76      Pulse  61 61 62      Temp  97 °F (36.1 °C)  98 °F (36.7 °C)      Respirations  18 11 16      Weight - Scale 220   220 220 210 210   Height 1.702 m (5' 7\")     1.702 m (5' 7\") 1.702 m (5' 7\")   BMI 34.5 kg/m2   0 kg/m2 0 kg/m2 33 kg/m2 33 kg/m2   Weight - Scale 99.8 kg (220 lb)   99.8 kg (220 lb) 99.8 kg (220 lb) 95.3 kg (210 lb) 95.3 kg (210 lb)   BMI (Calculated) 34.5   0 0 33 33       There is no height or weight on file to calculate BMI.    Service Dept: [facility]  [address] [place] [id number]  Dept: [phone]  Dept Fax: [phone]     Patient's Pharmacies:    [facility] [address] [place] [address] -  [phone] - F [phone]  [address] [place] [address]  Phone: [phone] Fax: [phone]    [facility][id number] - [address] [phone] - F [phone]  [facility] [address] [facility] [address] [place] [address]  Phone: [phone] Fax: [phone]    [facility] #[id number] - [facility] [address] [phone] - F [phone] [address] [facility] [address]  Phone: [phone] Fax: [phone]       Chief Complaint   Patient presents with    Post-Op Check    Knee Pain       HPI:  Patient present for postop care following a right total knee replacement on 2024.  Patient complains of nerve pains, sharp, stabbing pains.  Patient reports that she also has a history of restless leg syndrome and has not been taking her Requip.  Pain is an 6 out of 10 not well-controlled with Dilaudid or Percocet.  Patient reports that Percocet does occasionally

## 2024-03-22 ENCOUNTER — HOSPITAL ENCOUNTER (OUTPATIENT)
Age: 79
Setting detail: RECURRING SERIES
Discharge: HOME | End: 2024-03-25
Payer: MEDICARE

## 2024-03-22 PROCEDURE — 97140 MANUAL THERAPY 1/> REGIONS: CPT

## 2024-03-22 PROCEDURE — 97110 THERAPEUTIC EXERCISES: CPT

## 2024-03-22 NOTE — THERAPY RECERTIFICATION
[facility]ILIT[facility]  PHYSICAL THERAPY  [facility], [address] [place]  [address]  Phone: [phone]    Fax: [phone]   Progress Note/CONTINUED PLAN OF CARE for PHYSICAL THERAPY          Patient Name: Marika Mckeon : 1945   Treatment/Medical Diagnosis: Presence of right artificial knee joint [Z96.651]   Onset Date: 24    Referral Source: Torrey Caldwell MD Start of Care (SOC): 24   Prior Hospitalization: See Medical History Provider #: [id number]   Prior Level of Function: Pain free and independent with ADL's   Comorbidities: Past Medical History:   Diagnosis Date    Asthma     Breast cancer (HCC)     age 43    C. difficile diarrhea     GERD (gastroesophageal reflux disease)     HTN (hypertension)     Kidney stone     Menopause     Stool color black       Medications: Verified on Patient Summary List   Visits from SOC: 10 Missed Visits: 3       Short Term Goals:  Pt will have improved R knee ROM of 0-90 degrees for improved TKE during ambulation and improved ease when sitting. Met  Pt will ambulate community and household distances with LRAD for improved Ind with ambulation. Met      Long Term Goals:  Pt will have improved R knee ROM of 0-120 degrees for improve ease when squatting or stair negotiation. Progressing  Pt will negotiate stairs with reciprocal pattern for improved overall Ind and function. Met  Pt will have overall RLE strength of at least 4+/5 for improved ability to perform activities such as STS, squatting, stair negotiation. Met  Pt will be Ind with HEP for self-management of signs and symptoms. Met      Assessment/ Patient Update:   ROM / Strength  [] Unable to assess                  AROM                         PROM                     Strength       Left Right Left Right Left Right   Hip Flexion         4+       Extension                 Abduction                 Adduction               Knee Flexion 120 112 (65)   114 (70) 4+

## 2024-03-22 NOTE — PROGRESS NOTES
PT DAILY TREATMENT NOTE 8    Patient Name: Marika Mckeon  Date:3/22/2024  : 1945  [x]  Patient  Verified  Payor: MEDICARE / Plan: MEDICARE PART A AND B / Product Type: *No Product type* /    In time:1302  Out time:1406  Total Treatment Time (min): 64  Total Timed Codes (min): 54  1:1 Treatment Time (min): 54   Visit # 10      Treatment Area: Presence of right artificial knee joint [Z96.651]    SUBJECTIVE  Patient reports she continues to have nerve pain requiring intermittent use of percocet.   Pain Level (0-10 scale): 4/10  Any medication changes, allergies to medications, adverse drug reactions, diagnosis change, or new procedure performed?: [x] No    [] Yes (see summary sheet for update)        OBJECTIVE  Modality rationale: decrease edema and decrease pain to improve the patient’s ability to reduce pain   Min Type Additional Details    [] Estim: []Att   []Unatt  []TENS instruct                 []IFC  []Premod []NMES                       []Other:  []w/US   []w/ice   []w/heat  Position:  Location:    []  Traction: [] Cervical       []Lumbar                       [] Prone          []Supine                       []Intermittent   []Continuous Lbs:  [] before manual  [] after manual    []  Ultrasound: []Continuous   [] Pulsed                           []1MHz   []3MHz Location:  W/cm2:    []  Iontophoresis with dexamethasone         Location: [] Take home patch   [] In clinic    []  Ice     []  heat  []  Ice massage Position:  Location:   10 [x]  Vasopneumatic Device Pressure: [x] lo [] med [] hi   Temp: [x] lo [] med [] hi   [] Skin assessment post-treatment:  [x]intact [x]redness- no adverse reaction       []redness - adverse reaction:           38 min Therapeutic Exercise:  [x] See flow sheet :   Rationale: increase ROM, increase strength, and improve balance to improve the patient’s ability to complete stairs, dress and I'ADL's.    16 min Manual Therapy:  scar tissue mobilization as tolerated, PROM

## 2024-03-26 ENCOUNTER — HOSPITAL ENCOUNTER (OUTPATIENT)
Age: 79
Setting detail: RECURRING SERIES
Discharge: HOME | End: 2024-03-29
Payer: MEDICARE

## 2024-03-26 PROCEDURE — 97110 THERAPEUTIC EXERCISES: CPT

## 2024-03-26 PROCEDURE — 97016 VASOPNEUMATIC DEVICE THERAPY: CPT

## 2024-03-26 NOTE — PROGRESS NOTES
PT DAILY TREATMENT NOTE     Patient Name: Marika Mckeon  Date:3/26/2024  : 1945  [x]  Patient  Verified  Payor: MEDICARE / Plan: MEDICARE PART A AND B / Product Type: *No Product type* /    In time:755  Out time:08  Total Treatment Time (min): 61  Total Timed Codes (min): 61  1:1 Treatment Time (min): 51   Visit #: 11     Treatment Area: Presence of right artificial knee joint [Z96.651]    SUBJECTIVE  Pt states she had a difficult time after last visit . Stated she had to take a pain pill. States she knows its because the back of her leg is still tight     Pain Level (0-10 scale): 1    Any medication changes, allergies to medications, adverse drug reactions, diagnosis change, or new procedure performed?: [x] No    [] Yes (see summary sheet for update)        OBJECTIVE  Modality rationale: decrease edema, decrease inflammation, decrease pain, and increase tissue extensibility to improve the patient’s ability to optimaly heal   Min Type Additional Details    [] Estim: []Att   []Unatt  []TENS instruct                 []IFC  []Premod []NMES                       []Other:  []w/US   []w/ice   []w/heat  Position:  Location:    []  Traction: [] Cervical       []Lumbar                       [] Prone          []Supine                       []Intermittent   []Continuous Lbs:  [] before manual  [] after manual    []  Ultrasound: []Continuous   [] Pulsed                           []1MHz   []3MHz Location:  W/cm2:    []  Iontophoresis with dexamethasone         Location: [] Take home patch   [] In clinic    []  Ice     []  heat  []  Ice massage Position:  Location:   10 [x]  Vasopneumatic Device Pressure: [x] lo [] med [] hi   Temp: [x] lo [] med [] hi   [] Skin assessment post-treatment:  []intact [x]redness- no adverse reaction       []redness - adverse reaction:           51 min Therapeutic Exercise:  [x] See flow sheet :   Rationale: increase ROM, increase strength, improve coordination, and improve balance

## 2024-03-28 ENCOUNTER — HOSPITAL ENCOUNTER (OUTPATIENT)
Age: 79
Setting detail: RECURRING SERIES
Discharge: HOME | End: 2024-03-31
Payer: MEDICARE

## 2024-03-28 PROCEDURE — 97016 VASOPNEUMATIC DEVICE THERAPY: CPT

## 2024-03-28 PROCEDURE — 97110 THERAPEUTIC EXERCISES: CPT

## 2024-03-28 NOTE — PROGRESS NOTES
PT DAILY TREATMENT NOTE     Patient Name: Marika Mckeon  Date:3/28/2024  : 1945  [x]  Patient  Verified  Payor: MEDICARE / Plan: MEDICARE PART A AND B / Product Type: *No Product type* /    In time:755  Out time:859  Total Treatment Time (min): 64  Total Timed Codes (min): 64  1:1 Treatment Time (min): 54  Visit #: 13     Treatment Area: Presence of right artificial knee joint [Z96.651]    SUBJECTIVE  Pt states her knee keeps getting so stiff . States she tried the stretch at home that we did laying ion the bed but doesn't think she did  it correctly    Pain Level (0-10 scale): 1    Any medication changes, allergies to medications, adverse drug reactions, diagnosis change, or new procedure performed?: [x] No    [] Yes (see summary sheet for update)        OBJECTIVE  Modality rationale: decrease edema, decrease inflammation, decrease pain, and increase tissue extensibility to improve the patient’s ability to optimally heal   Min Type Additional Details    [] Estim: []Att   []Unatt  []TENS instruct                 []IFC  []Premod []NMES                       []Other:  []w/US   []w/ice   []w/heat  Position:  Location:    []  Traction: [] Cervical       []Lumbar                       [] Prone          []Supine                       []Intermittent   []Continuous Lbs:  [] before manual  [] after manual    []  Ultrasound: []Continuous   [] Pulsed                           []1MHz   []3MHz Location:  W/cm2:    []  Iontophoresis with dexamethasone         Location: [] Take home patch   [] In clinic    []  Ice     []  heat  []  Ice massage Position:  Location:   10 [x]  Vasopneumatic Device Pressure: [x] lo [] med [] hi   Temp: [x] lo [] med [] hi   [x] Skin assessment post-treatment:  [x]intact [x]redness- no adverse reaction       []redness - adverse reaction:           54 min Therapeutic Exercise:  [x] See flow sheet :   Rationale: increase ROM, increase strength, improve coordination, and improve balance

## 2024-04-01 ENCOUNTER — HOSPITAL ENCOUNTER (OUTPATIENT)
Age: 79
Discharge: HOME | End: 2024-04-03
Attending: ORTHOPAEDIC SURGERY
Payer: MEDICARE

## 2024-04-01 ENCOUNTER — OFFICE VISIT (OUTPATIENT)
Age: 79
End: 2024-04-01

## 2024-04-01 ENCOUNTER — TELEPHONE (OUTPATIENT)
Age: 79
End: 2024-04-01

## 2024-04-01 DIAGNOSIS — M79.604 RIGHT LEG PAIN: Primary | ICD-10-CM

## 2024-04-01 DIAGNOSIS — M79.604 RIGHT LEG PAIN: ICD-10-CM

## 2024-04-01 PROCEDURE — 93971 EXTREMITY STUDY: CPT

## 2024-04-01 RX ORDER — FLUCONAZOLE 100 MG/1
TABLET ORAL
COMMUNITY
Start: 2023-12-27

## 2024-04-01 NOTE — PROGRESS NOTES
Name: Marika Mckeon    : 1945     [facility]ICINE  [address], [address]  [facility] [address]  Dept: [phone]  Dept Fax: [phone]     Chief Complaint   Patient presents with    Post-Op Check    Knee Pain        LMP  (LMP Unknown)      Allergies   Allergen Reactions    Azithromycin Diarrhea    Bactrim [Sulfamethoxazole-Trimethoprim] Other (See Comments)     Unknown      Cefdinir     Erythromycin Diarrhea     GI distress        Current Outpatient Medications   Medication Sig Dispense Refill    fluconazole (DIFLUCAN) 100 MG tablet TAKE 1 TABLET BY MOUTH ONCE DAILY FOR 7 DAYS      gabapentin (NEURONTIN) 100 MG capsule Take 1 capsule by mouth 3 times daily for 30 days. Max Daily Amount: 300 mg 90 capsule 0    ondansetron (ZOFRAN-ODT) 8 MG TBDP disintegrating tablet Place 1 tablet under the tongue every 8 hours as needed for Nausea or Vomiting 20 tablet 0    aspirin 325 MG tablet Take 1 tablet by mouth in the morning and at bedtime DO NOT START MEDICATION UNTIL 24 HOURS AFTER SURGERY 60 tablet 0    HYDROcodone-chlorpheniramine (TUSSIONEX) 10-8 MG/5ML SUER Take 5 mLs by mouth daily as needed.      trospium (SANCTURA) 20 MG tablet       simvastatin (ZOCOR) 10 MG tablet TAKE 1 TABLET BY MOUTH ONCE DAILY IN THE EVENING FOR CHOLESTEROL      LORazepam (ATIVAN) 0.5 MG tablet TAKE 1 TABLET BY MOUTH THREE TIMES DAILY FOR 20 DAYS      hydroCHLOROthiazide (HYDRODIURIL) 12.5 MG tablet       amLODIPine (NORVASC) 5 MG tablet       Ascorbic Acid (VITAMIN C PO) Take by mouth      cetirizine (ZYRTEC) 10 MG tablet Take 1 tablet by mouth daily      Cholecalciferol 75 MCG (3000 UT) TABS Take 2,000 Units by mouth daily      losartan (COZAAR) 50 MG tablet Take 1 tablet by mouth daily      omeprazole (PRILOSEC) 40 MG delayed release capsule 2 times daily       No current facility-administered medications for this visit.      Patient Active

## 2024-04-01 NOTE — TELEPHONE ENCOUNTER
Patient had PVL done at the hospital today (4/1/24). Dr. Noel Clements who is her PCP was there for the PVL and a DVT was confirmed. He immediately had her come to his office to be seen and is prescribing her Xarelto.

## 2024-04-02 ENCOUNTER — TELEPHONE (OUTPATIENT)
Age: 79
End: 2024-04-02

## 2024-04-02 NOTE — TELEPHONE ENCOUNTER
Spoke with Dr. Caldwell regarding patient's concerns with Xarelto, he is requesting that the patient be seen by Dr. Angulo either today or tomorrow.  I called Dr. Angulo's office and spoke with Marika who states that they have already spoken with the patient and have her coming in today at 1:00pm for an evaluation.

## 2024-04-02 NOTE — TELEPHONE ENCOUNTER
Attempted to call patient this morning regarding vascular referral, no answer and Vmbox full I could not leave a message.

## 2024-04-02 NOTE — TELEPHONE ENCOUNTER
Patient called in expressing concerns that the Xarelto that Dr. Clements prescribed her yesterday is causing extreme side effects.  She is wanting to know if we are instead able to prescribe an injectable blood thinner for her.  She is scheduled for a re-scan and evaluation with Dr. Angulo on 4/11.

## 2024-04-02 NOTE — TELEPHONE ENCOUNTER
Shelly with [facility], Dr. Angulo's office, called to inform us that Ms. Mckeon is negative for a DVT. They have provided the patient with recommendations of a coated aspirin tablet and are scheduling her for a re-evaluation with Dr. Angulo on 04/11/2024.

## 2024-04-03 ENCOUNTER — TELEPHONE (OUTPATIENT)
Age: 79
End: 2024-04-03

## 2024-04-03 RX ORDER — LIDOCAINE 4 G/G
1 PATCH TOPICAL DAILY
Qty: 30 PATCH | Refills: 0 | Status: SHIPPED | OUTPATIENT
Start: 2024-04-03 | End: 2024-05-03

## 2024-04-03 NOTE — TELEPHONE ENCOUNTER
Patient called in expressing concerns of her post-operative treatment.  She was evaluated on Monday by Dr. Caldwell for continued pain and swelling and at the time of visit it was recommended that the patient go to the hospital to have a PVL study performed.  The patient had the PVL done and Dr. Caldwell received a call that the patient had a popliteal DVT.  During that timeframe, the patient's PCP, Dr. Clements, was also notified and the patient called in stating that Dr. Clements had placed her on Xarelto.  The patient called in yesterday stating that the medication that was prescribed to her by Dr. Clements was causing extreme side effects and she wanted to request an alternative, potentially an injectable blood thinner.  Our office reached out to Dr. Caldwell about this and his recommendation was an urgent evaluation with Dr. Angulo's office for further evaluation and treatment, appointment was scheduled for yesterday at 1:00pm.  The patient got re-scanned by Dr. Angulo's office and it was found that she was negative for a DVT, follow up with Dr. Angulo was scheduled and she will see him on 04/11/2024.  The patient expressed multiple concerns of how she was falsely diagnosed during her original PVL study and is discouraged that she had to go through unnecessary treatments involving medications that were not needed and further evaluation by vascular when her PCP said it was not needed. I explained to the patient that I understood her PCP did not recommend a vascular evaluation, however, it was Dr. Caldwell's protocol that is why we set up the appointment for further treatment if needed. I also advised the patient that she should also express her concerns of being falsely diagnosed to the appropriate hospital staff so they are able to handle the situation correctly on their end.  The patient did proceed to question our competency on how we treat and care for our patients and stated that she felt like she gets blown off by our

## 2024-04-09 ENCOUNTER — TELEPHONE (OUTPATIENT)
Age: 79
End: 2024-04-09

## 2024-04-09 DIAGNOSIS — M54.30 SCIATICA, UNSPECIFIED LATERALITY: Primary | ICD-10-CM

## 2024-04-09 NOTE — TELEPHONE ENCOUNTER
Patient called in this morning stating that she is experiencing nerve pain that runs from her hip down to her foot and that it is unbearable. She said \"she wants her life back\" and wants to know someone she can go to to help solve this problem.

## 2024-04-10 NOTE — TELEPHONE ENCOUNTER
Spoke with [facility] representative who states that the patient has an appointment with Dr. Barber on 04/12/2024.  I followed up with a phone call to the patient notifying her of the appointment and reassured her that it was our best interest to have her seek further evaluation to determine if her nerve pain can be stemming from her lower back and if it is referred pain she is experiencing post-operatively.  Patient expressed understanding and will follow up with a phone call if she needs further assistance from us.

## 2024-04-12 ENCOUNTER — OFFICE VISIT (OUTPATIENT)
Age: 79
End: 2024-04-12

## 2024-04-12 VITALS
TEMPERATURE: 97.9 F | WEIGHT: 223 LBS | OXYGEN SATURATION: 97 % | BODY MASS INDEX: 35 KG/M2 | HEART RATE: 73 BPM | HEIGHT: 67 IN

## 2024-04-12 DIAGNOSIS — M47.816 LUMBAR SPONDYLOSIS: ICD-10-CM

## 2024-04-12 DIAGNOSIS — M54.16 LUMBAR NEURITIS: ICD-10-CM

## 2024-04-12 DIAGNOSIS — Z96.651 PRESENCE OF RIGHT ARTIFICIAL KNEE JOINT: ICD-10-CM

## 2024-04-12 DIAGNOSIS — M51.36 DDD (DEGENERATIVE DISC DISEASE), LUMBAR: ICD-10-CM

## 2024-04-12 DIAGNOSIS — M54.40 ACUTE LOW BACK PAIN WITH SCIATICA, SCIATICA LATERALITY UNSPECIFIED, UNSPECIFIED BACK PAIN LATERALITY: Primary | ICD-10-CM

## 2024-04-12 RX ORDER — MELOXICAM 15 MG/1
15 TABLET ORAL DAILY
COMMUNITY

## 2024-04-12 RX ORDER — OXYCODONE HYDROCHLORIDE AND ACETAMINOPHEN 5; 325 MG/1; MG/1
1 TABLET ORAL EVERY 4 HOURS PRN
COMMUNITY

## 2024-04-12 NOTE — PROGRESS NOTES
[facility]  [address]  [place] [address]  Tel: [phone]  Fax: [phone]          INITIAL CONSULTATION      HISTORY OF PRESENT ILLNESS:  Marika Mckeon is a 78 y.o. female who is referred from Dr. Caldwell secondary to RLE radicular pain. She rates her pain 0-10/10. Patient comes into the office with c/o diffuse pain complaints. Pain is focally worse in her RLE (Onset duration unclear; ~1 month?). Patient says her pain has improved since the initial onset. She denies change in bowel or bladder habits. She denies loss of balance, falls, or impairments manual dexterity. Her pain is not positional in nature. She denies recent fevers, weight loss, rashes, or skin sores. She denies a hx of stomach ulcers or bleeding disorders. She denies a hx of history of spinal surgery or injections. She denies recent physical therapy or chiropractic care. She denies a hx of DM. Patient has a history of CKD III, GFR of 46 on 1/5/24. She has taken Mobic, Voltaren gel, Lidocaine patches. Gabapentin 100 mg TID. PmHx of breast cancer, HTN, kidney stones.    Note from Dr. Caldwell dated 4/1/24 indicating patient was seen for s/p right ERIN on 2/13/24. Patient started in Gabapentin 100 mg TID. Ordered venous doppler.     RLE Vascular Study dated 4/1/24 report reviewed by me. Per report, Acute appearing non-occlusive deep vein thrombosis in the right popliteal vein.    Note: Per pt, she reports that the doppler study was false and reports having obtained a new study which was negative, though report is unavailable. She reports that she has since discontinued Xarelto.     The patient is Right Handed.  reviewed. Gabapentin and Hydrocodone under NP Yue Lima. Body mass index is 34.93 kg/m².    PCP: Thony Clements MD    Past Medical History:   Diagnosis Date    Asthma     Breast cancer (HCC)     age 43    C. difficile diarrhea     GERD (gastroesophageal reflux disease)     HTN

## 2024-05-23 ENCOUNTER — HOSPITAL ENCOUNTER (OUTPATIENT)
Age: 79
Discharge: HOME | End: 2024-05-23
Payer: MEDICARE

## 2024-05-23 ENCOUNTER — OFFICE VISIT (OUTPATIENT)
Age: 79
End: 2024-05-23

## 2024-05-23 DIAGNOSIS — Z96.651 PRESENCE OF RIGHT ARTIFICIAL KNEE JOINT: ICD-10-CM

## 2024-05-23 DIAGNOSIS — M25.561 CHRONIC PAIN OF RIGHT KNEE: Primary | ICD-10-CM

## 2024-05-23 DIAGNOSIS — Z96.651 PRESENCE OF RIGHT ARTIFICIAL KNEE JOINT: Primary | ICD-10-CM

## 2024-05-23 DIAGNOSIS — G89.29 CHRONIC PAIN OF RIGHT KNEE: Primary | ICD-10-CM

## 2024-05-23 PROCEDURE — 73564 X-RAY EXAM KNEE 4 OR MORE: CPT

## 2024-05-23 NOTE — PROGRESS NOTES
Name: Marika Mckeon    : 1945     [facility] ORTHOPAEDICS AND SPORTS MEDICINE  [address], [address]  [facility] [address]  Dept: [phone]  Dept Fax: [phone]     Chief Complaint   Patient presents with    Knee Pain        LMP  (LMP Unknown)      Allergies   Allergen Reactions    Azithromycin Diarrhea    Bactrim [Sulfamethoxazole-Trimethoprim] Other (See Comments)     Unknown      Cefdinir     Erythromycin Diarrhea     GI distress        Current Outpatient Medications   Medication Sig Dispense Refill    meloxicam (MOBIC) 15 MG tablet Take 1 tablet by mouth daily      oxyCODONE-acetaminophen (PERCOCET) 5-325 MG per tablet Take 1 tablet by mouth every 4 hours as needed for Pain. Max Daily Amount: 6 tablets      diclofenac sodium (VOLTAREN) 1 % GEL Apply 4 g topically 4 times daily as needed for Pain Apply 2 grams to affected area 4x a day FOR  g 8    fluconazole (DIFLUCAN) 100 MG tablet TAKE 1 TABLET BY MOUTH ONCE DAILY FOR 7 DAYS (Patient not taking: Reported on 2024)      gabapentin (NEURONTIN) 100 MG capsule Take 1 capsule by mouth 3 times daily for 30 days. Max Daily Amount: 300 mg 90 capsule 0    ondansetron (ZOFRAN-ODT) 8 MG TBDP disintegrating tablet Place 1 tablet under the tongue every 8 hours as needed for Nausea or Vomiting (Patient not taking: Reported on 2024) 20 tablet 0    aspirin 325 MG tablet Take 1 tablet by mouth in the morning and at bedtime DO NOT START MEDICATION UNTIL 24 HOURS AFTER SURGERY (Patient taking differently: Take 81 mg by mouth daily DO NOT START MEDICATION UNTIL 24 HOURS AFTER SURGERY) 60 tablet 0    HYDROcodone-chlorpheniramine (TUSSIONEX) 10-8 MG/5ML SUER Take 5 mLs by mouth daily as needed.      trospium (SANCTURA) 20 MG tablet  (Patient not taking: Reported on 2024)      simvastatin (ZOCOR) 10 MG tablet TAKE 1 TABLET BY MOUTH ONCE DAILY IN THE EVENING FOR CHOLESTEROL

## 2024-06-05 ENCOUNTER — HOSPITAL ENCOUNTER (OUTPATIENT)
Age: 79
Setting detail: RECURRING SERIES
Discharge: HOME | End: 2024-06-08
Payer: MEDICARE

## 2024-06-05 PROCEDURE — 97161 PT EVAL LOW COMPLEX 20 MIN: CPT

## 2024-06-05 PROCEDURE — 97110 THERAPEUTIC EXERCISES: CPT

## 2024-06-05 NOTE — THERAPY EVALUATION
diagnosis change, or new procedure performed?: [x] No    [] Yes (see summary sheet for update)          OBJECTIVE/EXAMINATION  Palpation: TTP along B pes anserine, TTP along incision near patella      ROM / Strength  [] Unable to assess                  AROM                         PROM                     Strength     Left Right Left Right Left Right   Hip Flexion     4 4    Extension     3 3    Abduction     3+ 3+    Adduction     4 4+   Knee Flexion 124 115  125 3 3    Extension 0 Lacking 10  Lacking 5 5 4+   Ankle Plantarflexion          Dorsiflexion  Inversion  Eversion             Patellar Mobility:     Hypermobile: [] Left   [] Right  Hypomobile: [] Left   [x] Right                     .    Gait:  [] Normal    [x] Abnormal    [] Antalgic    [] NWB    Device: no AD    Distance: 75ft  Comments: decreased terminal knee extension in stance phase of gait     Other Tests:   TUG: 15 sec    38 min []Eval                  []Re-Eval       15 min Therapeutic Exercise:  [] See flow sheet :   Rationale:  ROM and LE strengthening exercises  to improve the patient’s ability to perform ADLs with greater ease.             With   [] TE   [] TA   [] neuro   [] other: Patient Education: [x] Review HEP    [] Progressed/Changed HEP based on:   [] positioning   [] body mechanics   [] transfers   [] heat/ice application    [] other:        Pain Level (0-10 scale) post treatment: 0/10      ASSESSMENT/Functional Analysis  see POC    [x]  See plan of care  []  Discharge due to:_  []  Other:_      Paul Ventura, PT, DPT, CSCS 6/5/2024  2:59 PM

## 2024-06-05 NOTE — THERAPY EVALUATION
personal factors that will impact the outcome / POCExam:LOW Complexity : 1-2 Standardized tests and measures addressing body structure, function, activity limitation and / or participation in recreation  Presentation: LOW Complexity : Stable, uncomplicated  Clinical Decision Making:LOW Complexity : FOTO score of 75-100Overall Complexity:LOW     Problem List: pain affecting function, decrease ROM, decrease strength, impaired gait/ balance, and decrease flexibility/ joint mobility   Treatment Plan may include any combination of the following: Theraputic Exercise, Moist Heat, Cryotherapy, Ultrasound, Electrical Stimulation, Manual Therapy, Gait and Balance Training, Teaching of a HEP, Vasopneumatic Compression Neuromuscular re-education, and Therapeutic activities  Patient / Family readiness to learn indicated by: asking questions, trying to perform skills, interest, return verbalization , and return demonstration   Persons(s) to be included in education: patient (P)  Barriers to Learning/Limitations: No      Patient self reported health status: good  Rehabilitation Potential: good    Objective Measures:  Palpation: TTP along B pes anserine, TTP along incision near patella        ROM / Strength  [] Unable to assess                  AROM                         PROM                     Strength       Left Right Left Right Left Right   Hip Flexion         4 4     Extension         3 3     Abduction         3+ 3+     Adduction         4 4+   Knee Flexion 124 115   125 3 3     Extension 0 Lacking 10   Lacking 5 5 4+   Ankle Plantarflexion                 Dorsiflexion  Inversion  Eversion                     Patellar Mobility:     Hypermobile:       [] Left   [] Right         Hypomobile:   [] Left   [x] Right                      .     Gait:                [] Normal    [x] Abnormal    [] Antalgic    [] NWB    Device: no AD                          Distance: 75ft  Comments: decreased terminal knee extension in stance phase

## 2024-06-11 ENCOUNTER — HOSPITAL ENCOUNTER (OUTPATIENT)
Age: 79
Setting detail: RECURRING SERIES
Discharge: HOME | End: 2024-06-14
Payer: MEDICARE

## 2024-06-11 PROCEDURE — 97016 VASOPNEUMATIC DEVICE THERAPY: CPT

## 2024-06-11 PROCEDURE — 97110 THERAPEUTIC EXERCISES: CPT

## 2024-06-11 NOTE — PROGRESS NOTES
PT DAILY TREATMENT NOTE     Patient Name: Marika Mckeon  Date:2024  : 1945  [x]  Patient  Verified  Payor: MEDICARE / Plan: MEDICARE PART A AND B / Product Type: *No Product type* /    In time:1105  Out time:1153  Total Treatment Time (min): 48  Total Timed Codes (min): 48  1:1 Treatment Time (min): 38  Visit #: 2     Treatment Area: Pain in right knee [M25.561]  Other chronic pain [G89.29]    SUBJECTIVE  Pt states she is doing better its just the back of her knee stays tight and that's where the pain is . States she does continue to ice R knee at home and will continue to work on exercises     Pain Level (0-10 scale): 7    Any medication changes, allergies to medications, adverse drug reactions, diagnosis change, or new procedure performed?: [x] No    [] Yes (see summary sheet for update)        OBJECTIVE  Modality rationale: decrease edema, decrease inflammation, decrease pain, and increase tissue extensibility to improve the patient’s ability to optimally heal   Min Type Additional Details    [] Estim: []Att   []Unatt  []TENS instruct                 []IFC  []Premod []NMES                       []Other:  []w/US   []w/ice   []w/heat  Position:  Location:    []  Traction: [] Cervical       []Lumbar                       [] Prone          []Supine                       []Intermittent   []Continuous Lbs:  [] before manual  [] after manual    []  Ultrasound: []Continuous   [] Pulsed                           []1MHz   []3MHz Location:  W/cm2:    []  Iontophoresis with dexamethasone         Location: [] Take home patch   [] In clinic    []  Ice     []  heat  []  Ice massage Position:  Location:   10 [x]  Vasopneumatic Device Pressure: [x] lo [] med [] hi   Temp: [x] lo [] med [] hi   [x] Skin assessment post-treatment:  [x]intact [x]redness- no adverse reaction       []redness - adverse reaction:           38 min Therapeutic Exercise:  [x] See flow sheet :   Rationale: increase ROM, increase

## 2024-06-13 ENCOUNTER — HOSPITAL ENCOUNTER (OUTPATIENT)
Age: 79
Setting detail: RECURRING SERIES
Discharge: HOME | End: 2024-06-16
Payer: MEDICARE

## 2024-06-13 PROCEDURE — 97016 VASOPNEUMATIC DEVICE THERAPY: CPT

## 2024-06-13 PROCEDURE — 97110 THERAPEUTIC EXERCISES: CPT

## 2024-06-13 NOTE — PROGRESS NOTES
housheold and leisure activities while remaining pain free.                  With TE  TA   NR  GT   Misc Patient Education: [x] Review HEP    [] Progressed/Changed HEP based on:   [] positioning   [] body mechanics   [] transfers   [] heat/ice application          Pain Level (0-10 scale) post treatment: 4-5    ASSESSMENT/Changes in Function: Began session on stepper for active warm up x 10' level 1.  Followed by HSS/HCS on slant box, calf raises, mini squats, LAQ, and SLR.  Performed quad sets with passive resistance . Introduced HSC and TKE with green T band. Pt education on HEP and importance of doing stretches at home . Ended treatment with vaso to R LE x 10'.  Plan to continue POC progressing as able.     Patient will continue to benefit from skilled PT services to modify and progress therapeutic interventions, analyze and address functional mobility deficits, analyze and address ROM deficits, analyze and address strength deficits, analyze and address soft tissue restrictions, analyze and cue for proper movement patterns, and analyze and modify for postural abnormalities to attain remaining goals.       [x]  See Plan of Care  []  See progress note/recertification  []  See Discharge Summary           PLAN  []  Upgrade activities as tolerated     [x]  Continue plan of care  []  Update interventions per flow sheet       []  Discharge due to:_  []  Other:_      Kaitlin Lyons PTA, LPTA 6/13/2024  10:39 AM

## 2024-06-17 ENCOUNTER — TELEPHONE (OUTPATIENT)
Age: 79
End: 2024-06-17

## 2024-06-17 ENCOUNTER — HOSPITAL ENCOUNTER (OUTPATIENT)
Age: 79
Discharge: HOME | End: 2024-06-19
Attending: INTERNAL MEDICINE
Payer: MEDICARE

## 2024-06-17 VITALS
WEIGHT: 217 LBS | DIASTOLIC BLOOD PRESSURE: 71 MMHG | SYSTOLIC BLOOD PRESSURE: 157 MMHG | HEIGHT: 67 IN | BODY MASS INDEX: 34.06 KG/M2

## 2024-06-17 DIAGNOSIS — I34.1 MVP (MITRAL VALVE PROLAPSE): ICD-10-CM

## 2024-06-17 LAB
ECHO AO ASC DIAM: 3.4 CM
ECHO AO ASCENDING AORTA INDEX: 1.63 CM/M2
ECHO AO ROOT DIAM: 3.4 CM
ECHO AO ROOT INDEX: 1.63 CM/M2
ECHO AR MAX VEL PISA: 3.8 M/S
ECHO AV AREA PEAK VELOCITY: 2.8 CM2
ECHO AV AREA VTI: 2.6 CM2
ECHO AV AREA/BSA PEAK VELOCITY: 1.3 CM2/M2
ECHO AV AREA/BSA VTI: 1.2 CM2/M2
ECHO AV MEAN GRADIENT: 4 MMHG
ECHO AV MEAN VELOCITY: 1 M/S
ECHO AV PEAK GRADIENT: 7 MMHG
ECHO AV PEAK VELOCITY: 1.3 M/S
ECHO AV REGURGITANT PHT: 661 MS
ECHO AV VELOCITY RATIO: 0.85
ECHO AV VTI: 31.9 CM
ECHO BSA: 2.16 M2
ECHO EST RA PRESSURE: 8 MMHG
ECHO IVC PROX: 2.3 CM
ECHO LA AREA 4C: 20.7 CM2
ECHO LA DIAMETER INDEX: 1.96 CM/M2
ECHO LA DIAMETER: 4.1 CM
ECHO LA MAJOR AXIS: 5.8 CM
ECHO LA TO AORTIC ROOT RATIO: 1.21
ECHO LA VOL MOD A4C: 61 ML (ref 22–52)
ECHO LA VOLUME INDEX MOD A4C: 29 ML/M2 (ref 16–34)
ECHO LV E' LATERAL VELOCITY: 5 CM/S
ECHO LV E' SEPTAL VELOCITY: 5 CM/S
ECHO LV EDV A2C: 39 ML
ECHO LV EDV A4C: 62 ML
ECHO LV EDV INDEX A4C: 30 ML/M2
ECHO LV EDV NDEX A2C: 19 ML/M2
ECHO LV EJECTION FRACTION A2C: 60 %
ECHO LV EJECTION FRACTION A4C: 60 %
ECHO LV EJECTION FRACTION BIPLANE: 59 % (ref 55–100)
ECHO LV ESV A2C: 16 ML
ECHO LV ESV A4C: 25 ML
ECHO LV ESV INDEX A2C: 8 ML/M2
ECHO LV ESV INDEX A4C: 12 ML/M2
ECHO LV FRACTIONAL SHORTENING: 33 % (ref 28–44)
ECHO LV INTERNAL DIMENSION DIASTOLE INDEX: 2.2 CM/M2
ECHO LV INTERNAL DIMENSION DIASTOLIC: 4.6 CM (ref 3.9–5.3)
ECHO LV INTERNAL DIMENSION SYSTOLIC INDEX: 1.48 CM/M2
ECHO LV INTERNAL DIMENSION SYSTOLIC: 3.1 CM
ECHO LV IVSD: 1.2 CM (ref 0.6–0.9)
ECHO LV MASS 2D: 192.9 G (ref 67–162)
ECHO LV MASS INDEX 2D: 92.3 G/M2 (ref 43–95)
ECHO LV POSTERIOR WALL DIASTOLIC: 1.1 CM (ref 0.6–0.9)
ECHO LV RELATIVE WALL THICKNESS RATIO: 0.48
ECHO LVOT AREA: 3.5 CM2
ECHO LVOT AV VTI INDEX: 0.74
ECHO LVOT DIAM: 2.1 CM
ECHO LVOT MEAN GRADIENT: 2 MMHG
ECHO LVOT PEAK GRADIENT: 5 MMHG
ECHO LVOT PEAK VELOCITY: 1.1 M/S
ECHO LVOT STROKE VOLUME INDEX: 39.3 ML/M2
ECHO LVOT SV: 82 ML
ECHO LVOT VTI: 23.7 CM
ECHO MV A VELOCITY: 1 M/S
ECHO MV AREA VTI: 3.1 CM2
ECHO MV E DECELERATION TIME (DT): 323 MS
ECHO MV E VELOCITY: 0.6 M/S
ECHO MV E/A RATIO: 0.6
ECHO MV E/E' LATERAL: 12
ECHO MV E/E' RATIO (AVERAGED): 12
ECHO MV E/E' SEPTAL: 12
ECHO MV LVOT VTI INDEX: 1.13
ECHO MV MAX VELOCITY: 1 M/S
ECHO MV MEAN GRADIENT: 1 MMHG
ECHO MV MEAN VELOCITY: 0.5 M/S
ECHO MV PEAK GRADIENT: 4 MMHG
ECHO MV VTI: 26.7 CM
ECHO PV MAX VELOCITY: 1 M/S
ECHO PV PEAK GRADIENT: 4 MMHG
ECHO RA AREA 4C: 16.2 CM2
ECHO RA END SYSTOLIC VOLUME APICAL 4 CHAMBER INDEX BSA: 20 ML/M2
ECHO RA VOLUME: 42 ML
ECHO RIGHT VENTRICULAR SYSTOLIC PRESSURE (RVSP): 32 MMHG
ECHO RV BASAL DIMENSION: 3.7 CM
ECHO RV LONGITUDINAL DIMENSION: 5.7 CM
ECHO RV MID DIMENSION: 2.6 CM
ECHO RV TAPSE: 1.9 CM (ref 1.7–?)
ECHO TV REGURGITANT MAX VELOCITY: 2.47 M/S
ECHO TV REGURGITANT PEAK GRADIENT: 24 MMHG

## 2024-06-17 PROCEDURE — 93306 TTE W/DOPPLER COMPLETE: CPT

## 2024-06-17 NOTE — TELEPHONE ENCOUNTER
Pt had a RT TKR 02.13.2024    Pt stopped by the window and stated that she is no longer going to therapy because it is making her nerves act up again.    Pt states PT was helping in one way but hurting in another.    She stated she does not want to take the Percocet anymore because she does not want to become a \"drug addict\"     1 Tramdol only as needed at night along with Gabapentin.     She states she would just like to let us know

## 2024-06-18 ENCOUNTER — APPOINTMENT (OUTPATIENT)
Age: 79
End: 2024-06-18
Payer: MEDICARE

## 2024-06-18 NOTE — THERAPY DISCHARGE
phase of gait     Other Tests:              TUG: 15 sec  LEFS: 34/80         Goal Status Final:  Short Term Goals: (4 weeks)  Pt will increase R knee AROM to 5-120 to demonstrate increased functional use of LE to ascend stairs and restore gait mechanics. Not Met  Pt will have no greater than 4/10 pain when performing exercises to demonstrate progression to PLOF. Not Met  Pt will increase LEFS score to at least 40/80 to demonstrate increased function. Not Met     Long Term Goals: (8 weeks)  Pt will achieve full extension on R knee to restore proper gait mechanics. Not Met  Pt will have no greater than 1/10 with activity to demonstrate progression to PLOF. Not Met  Pt will lower TUG time to no greater than 11 seconds to decrease risk for falls. Not Met  Pt will be independent with HEP for long term management of symptoms. Not Met      Key Functional Changes/Progress: Pt still lacks full ROM and has remaining strength deficits. She has poor compliance to HEP and lack of attendance to sessions. Pt has not met any goals set at evaluation.     Assessments/Recommendations: Discontinue therapy due to lack of attendance or compliance. Pt cancelled remaining sessions due to complaints of increased pain with exercise. At evaluation, educated patient on the importance of exercises to optimize function.     If you have any questions/comments please contact us directly at [phone].   Thank you for allowing us to assist in the care of your patient.    Therapist Signature: Paul Ventura, PT, DPT, CSCS Date: 6/18/2024   Reporting Period: 6/5/24-6/13/24 Time: 10:35 AM      Certification Period: 06/05/24-09/03/24        NOTE TO PHYSICIAN:  PLEASE COMPLETE THE ORDERS BELOW AND FAX TO   Reji Gonzalez [facility]: [phone].  If you are unable to process this request in 24 hours please contact our office: [phone].    ___ I have read the above report and request that my patient be discharged from

## 2024-06-20 ENCOUNTER — APPOINTMENT (OUTPATIENT)
Age: 79
End: 2024-06-20
Payer: MEDICARE

## 2024-11-25 ENCOUNTER — HOSPITAL ENCOUNTER (OUTPATIENT)
Age: 79
Discharge: HOME | End: 2024-11-28
Attending: STUDENT IN AN ORGANIZED HEALTH CARE EDUCATION/TRAINING PROGRAM
Payer: MEDICARE

## 2024-11-25 DIAGNOSIS — N20.0 KIDNEY STONES: ICD-10-CM

## 2024-11-25 PROCEDURE — 76770 US EXAM ABDO BACK WALL COMP: CPT

## 2025-04-22 ENCOUNTER — ANESTHESIA EVENT (OUTPATIENT)
Age: 80
End: 2025-04-22
Payer: MEDICARE

## 2025-04-22 RX ORDER — SODIUM CHLORIDE 0.9 % (FLUSH) 0.9 %
5-40 SYRINGE (ML) INJECTION EVERY 12 HOURS SCHEDULED
Status: CANCELLED | OUTPATIENT
Start: 2025-04-22

## 2025-04-22 RX ORDER — CRANBERRY FRUIT EXTRACT 250 MG
CAPSULE ORAL
COMMUNITY

## 2025-04-22 RX ORDER — SODIUM CHLORIDE 9 MG/ML
INJECTION, SOLUTION INTRAVENOUS PRN
Status: CANCELLED | OUTPATIENT
Start: 2025-04-22

## 2025-04-22 RX ORDER — LABETALOL 100 MG/1
100 TABLET, FILM COATED ORAL 2 TIMES DAILY
COMMUNITY

## 2025-04-22 RX ORDER — SODIUM CHLORIDE 0.9 % (FLUSH) 0.9 %
5-40 SYRINGE (ML) INJECTION PRN
Status: CANCELLED | OUTPATIENT
Start: 2025-04-22

## 2025-04-23 ENCOUNTER — ANESTHESIA (OUTPATIENT)
Age: 80
End: 2025-04-23
Payer: MEDICARE

## 2025-04-23 ENCOUNTER — HOSPITAL ENCOUNTER (OUTPATIENT)
Age: 80
Setting detail: OUTPATIENT SURGERY
Discharge: HOME | End: 2025-04-23
Attending: OPHTHALMOLOGY | Admitting: OPHTHALMOLOGY
Payer: MEDICARE

## 2025-04-23 VITALS
RESPIRATION RATE: 16 BRPM | DIASTOLIC BLOOD PRESSURE: 72 MMHG | BODY MASS INDEX: 33.12 KG/M2 | SYSTOLIC BLOOD PRESSURE: 168 MMHG | HEART RATE: 58 BPM | HEIGHT: 67 IN | OXYGEN SATURATION: 97 % | WEIGHT: 211 LBS | TEMPERATURE: 97.5 F

## 2025-04-23 PROCEDURE — 3700000001 HC ADD 15 MINUTES (ANESTHESIA): Performed by: OPHTHALMOLOGY

## 2025-04-23 PROCEDURE — 2500000003 HC RX 250 WO HCPCS: Performed by: OPHTHALMOLOGY

## 2025-04-23 PROCEDURE — 3700000000 HC ANESTHESIA ATTENDED CARE: Performed by: OPHTHALMOLOGY

## 2025-04-23 PROCEDURE — 3600000002 HC SURGERY LEVEL 2 BASE: Performed by: OPHTHALMOLOGY

## 2025-04-23 PROCEDURE — 6360000002 HC RX W HCPCS: Performed by: OPHTHALMOLOGY

## 2025-04-23 PROCEDURE — 3600000012 HC SURGERY LEVEL 2 ADDTL 15MIN: Performed by: OPHTHALMOLOGY

## 2025-04-23 PROCEDURE — 6370000000 HC RX 637 (ALT 250 FOR IP): Performed by: OPHTHALMOLOGY

## 2025-04-23 PROCEDURE — V2632 POST CHMBR INTRAOCULAR LENS: HCPCS | Performed by: OPHTHALMOLOGY

## 2025-04-23 PROCEDURE — 2709999900 HC NON-CHARGEABLE SUPPLY: Performed by: OPHTHALMOLOGY

## 2025-04-23 PROCEDURE — 7100000010 HC PHASE II RECOVERY - FIRST 15 MIN: Performed by: OPHTHALMOLOGY

## 2025-04-23 PROCEDURE — 2580000003 HC RX 258: Performed by: NURSE ANESTHETIST, CERTIFIED REGISTERED

## 2025-04-23 PROCEDURE — 6360000002 HC RX W HCPCS: Performed by: NURSE ANESTHETIST, CERTIFIED REGISTERED

## 2025-04-23 DEVICE — TECNIS SIMPLICITY TECNIS EYHANCE U 22.0D
Type: IMPLANTABLE DEVICE | Site: EYE | Status: FUNCTIONAL
Brand: TECNIS SIMPLICITY

## 2025-04-23 RX ORDER — 0.9 % SODIUM CHLORIDE 0.9 %
INTRAVENOUS SOLUTION INTRAVENOUS
Status: DISCONTINUED | OUTPATIENT
Start: 2025-04-23 | End: 2025-04-23 | Stop reason: SDUPTHER

## 2025-04-23 RX ORDER — EPINEPHRINE 1 MG/ML(1)
AMPUL (ML) INJECTION PRN
Status: DISCONTINUED | OUTPATIENT
Start: 2025-04-23 | End: 2025-04-23 | Stop reason: ALTCHOICE

## 2025-04-23 RX ORDER — MIDAZOLAM HYDROCHLORIDE 1 MG/ML
INJECTION, SOLUTION INTRAMUSCULAR; INTRAVENOUS
Status: DISCONTINUED | OUTPATIENT
Start: 2025-04-23 | End: 2025-04-23 | Stop reason: SDUPTHER

## 2025-04-23 RX ORDER — SODIUM CHLORIDE 0.9 % (FLUSH) 0.9 %
5-40 SYRINGE (ML) INJECTION EVERY 12 HOURS SCHEDULED
Status: DISCONTINUED | OUTPATIENT
Start: 2025-04-23 | End: 2025-04-23 | Stop reason: HOSPADM

## 2025-04-23 RX ORDER — LIDOCAINE HYDROCHLORIDE 10 MG/ML
INJECTION, SOLUTION EPIDURAL; INFILTRATION; INTRACAUDAL; PERINEURAL PRN
Status: DISCONTINUED | OUTPATIENT
Start: 2025-04-23 | End: 2025-04-23 | Stop reason: ALTCHOICE

## 2025-04-23 RX ORDER — TRIAMCINOLONE ACETONIDE 40 MG/ML
INJECTION, SUSPENSION INTRA-ARTICULAR; INTRAMUSCULAR PRN
Status: DISCONTINUED | OUTPATIENT
Start: 2025-04-23 | End: 2025-04-23 | Stop reason: ALTCHOICE

## 2025-04-23 RX ORDER — SODIUM CHLORIDE 9 MG/ML
INJECTION, SOLUTION INTRAVENOUS PRN
Status: CANCELLED | OUTPATIENT
Start: 2025-04-23

## 2025-04-23 RX ORDER — SODIUM CHLORIDE 0.9 % (FLUSH) 0.9 %
5-40 SYRINGE (ML) INJECTION PRN
Status: DISCONTINUED | OUTPATIENT
Start: 2025-04-23 | End: 2025-04-23 | Stop reason: HOSPADM

## 2025-04-23 RX ORDER — MOXIFLOXACIN 5 MG/ML
SOLUTION/ DROPS OPHTHALMIC PRN
Status: DISCONTINUED | OUTPATIENT
Start: 2025-04-23 | End: 2025-04-23 | Stop reason: ALTCHOICE

## 2025-04-23 RX ORDER — TETRACAINE HYDROCHLORIDE 5 MG/ML
SOLUTION OPHTHALMIC PRN
Status: DISCONTINUED | OUTPATIENT
Start: 2025-04-23 | End: 2025-04-23 | Stop reason: ALTCHOICE

## 2025-04-23 RX ORDER — SODIUM CHLORIDE, POTASSIUM CHLORIDE, CALCIUM CHLORIDE, MAGNESIUM CHLORIDE, SODIUM ACETATE, AND SODIUM CITRATE 6.4; .75; .48; .3; 3.9; 1.7 MG/ML; MG/ML; MG/ML; MG/ML; MG/ML; MG/ML
SOLUTION IRRIGATION PRN
Status: DISCONTINUED | OUTPATIENT
Start: 2025-04-23 | End: 2025-04-23 | Stop reason: ALTCHOICE

## 2025-04-23 RX ORDER — FENTANYL CITRATE 50 UG/ML
INJECTION, SOLUTION INTRAMUSCULAR; INTRAVENOUS
Status: DISCONTINUED | OUTPATIENT
Start: 2025-04-23 | End: 2025-04-23 | Stop reason: SDUPTHER

## 2025-04-23 RX ADMIN — SODIUM CHLORIDE 5 ML: 9 INJECTION, SOLUTION INTRAVENOUS at 08:42

## 2025-04-23 RX ADMIN — MIDAZOLAM 2 MG: 1 INJECTION INTRAMUSCULAR; INTRAVENOUS at 08:42

## 2025-04-23 RX ADMIN — FENTANYL CITRATE 25 MCG: 50 INJECTION INTRAMUSCULAR; INTRAVENOUS at 08:51

## 2025-04-23 RX ADMIN — Medication: at 07:36

## 2025-04-23 RX ADMIN — FENTANYL CITRATE 25 MCG: 50 INJECTION INTRAMUSCULAR; INTRAVENOUS at 08:48

## 2025-04-23 ASSESSMENT — PAIN - FUNCTIONAL ASSESSMENT
PAIN_FUNCTIONAL_ASSESSMENT: 0-10
PAIN_FUNCTIONAL_ASSESSMENT: NONE - DENIES PAIN

## 2025-04-23 NOTE — H&P
Day of Surgery H&P:  Marika Mckeon was seen and examined.  There have been no significant clinical changes since the completion of the exam in the office.  Pt continues to complain of persistent poor vision and/or glare in planned operative eye affecting ability to perform basic ADLs (such as reading or driving at night).        Past Medical History:   Diagnosis Date    Asthma     Breast cancer     age 43    C. difficile diarrhea     GERD (gastroesophageal reflux disease)     HTN (hypertension)     Kidney stone     Menopause     Stool color black        Family Medical History: Non-contributory    HOME MED LIST:  Prior to Admission medications    Medication Sig Start Date End Date Taking? Authorizing Provider   Cranberry 250 MG CAPS Take by mouth   Yes Arturo Jeffers MD   labetalol (NORMODYNE) 100 MG tablet Take 1 tablet by mouth 2 times daily   Yes Arturo Jeffers MD   montelukast (SINGULAIR) 10 MG tablet  9/14/24  Yes Arturo Jeffers MD   meloxicam (MOBIC) 15 MG tablet Take 1 tablet by mouth daily   Yes Arturo Jeffers MD   simvastatin (ZOCOR) 10 MG tablet TAKE 1 TABLET BY MOUTH ONCE DAILY IN THE EVENING FOR CHOLESTEROL 5/31/23  Yes ProviderArturo MD   LORazepam (ATIVAN) 0.5 MG tablet TAKE 1 TABLET BY MOUTH THREE TIMES DAILY FOR 20 DAYS 5/31/23  Yes ProviderArturo MD   Ascorbic Acid (VITAMIN C PO) Take by mouth   Yes ProviderArturo MD   cetirizine (ZYRTEC) 10 MG tablet Take 1 tablet by mouth daily   Yes Automatic Reconciliation, Ar   Cholecalciferol 75 MCG (3000 UT) TABS Take 2,000 Units by mouth daily   Yes Automatic Reconciliation, Ar   losartan (COZAAR) 50 MG tablet Take 1 tablet by mouth daily   Yes Automatic Reconciliation, Ar   omeprazole (PRILOSEC) 40 MG delayed release capsule 2 times daily 7/8/21  Yes Automatic Reconciliation, Ar   fluconazole (DIFLUCAN) 150 MG tablet Take 1 tablet by mouth daily  Patient not taking: Reported on 4/22/2025 12/30/24

## 2025-04-23 NOTE — OP NOTE
the capsular bag and the anterior chamber were deepened with viscoelastic material. The bag was inspected and found to be free of any tears or defects. The intraocular lens implant listed below was placed into the capsular bag with good centration. Irrigation and aspiration was used to remove the residual viscoelastic material from the capsular bag and the anterior chamber and the incision sites were hydrated the BSS and cannula.  The incisions were rehydrated and inspected with a weck-giovany sponge and were found to be water tight. The eye was inspected and the anterior chamber was deep, the pupil round, and the lens was in good position. A 25G syringe was used to place 0.5cc 40mg/cc Triamcinolone subconjunctivally.  The lid speculum was then removed under visualization. Several drops of antibiotic eye drops were instilled into patient's operated eye. The operated eye was then covered with an eyeshield. The patient tolerated the procedure well and was transferred to recovery in good condition.    Implant Name Type Inv. Item Serial No.  Lot No. LRB No. Used Action   LENS IOL TECNIS EYHANCE [id number] - [id number]  LENS IOL TECNIS EYHANCE [id number]  Left 1 Implanted       Danielle Navarro MD  04/23/25 9:34 AM

## 2025-04-23 NOTE — ANESTHESIA PRE PROCEDURE
\"PREGSERUM\", \"HCG\", \"HCGQUANT\"     ABGs: No results found for: \"PHART\", \"PO2ART\", \"TAY5YCI\", \"GGV2YIB\", \"BEART\", \"B9DDXBMZ\"     Type & Screen (If Applicable):  No results found for: \"ABORH\", \"LABANTI\"    Drug/Infectious Status (If Applicable):  No results found for: \"HIV\", \"HEPCAB\"    COVID-19 Screening (If Applicable):   Lab Results   Component Value Date/Time    COVID19 Not Detected 09/09/2021 04:00 PM    COVID19 Please find results under separate order 09/09/2021 04:00 PM           Anesthesia Evaluation  Patient summary reviewed  Airway: Mallampati: III          Dental:    (+) poor dentition      Pulmonary:normal exam    (+)           asthma:                            Cardiovascular:    (+) hypertension:                  Neuro/Psych:               GI/Hepatic/Renal:   (+) GERD:          Endo/Other:    (+) malignancy/cancer.                  ROS comment: Breast CA Abdominal:             Vascular:          Other Findings:       Anesthesia Plan      MAC     ASA 3       Induction: intravenous.      Anesthetic plan and risks discussed with patient.      Plan discussed with proceduralist/surgeon.                UBALDO ROBERTS, APRN - CRNA   4/23/2025

## 2025-04-23 NOTE — ANESTHESIA POSTPROCEDURE EVALUATION
Department of Anesthesiology  Postprocedure Note    Patient: Marika Mckeon  MRN: [id number]  YOB: 1945  Date of evaluation: 4/23/2025    Procedure Summary       Date: 04/23/25 Room / Location: [facility] 01 / [facility] MAIN OR    Anesthesia Start: 0840 Anesthesia Stop: 0902    Procedure: PHACO IOL OS (Left: Eye) Diagnosis:       Nuclear sclerotic cataract of left eye      (Nuclear sclerotic cataract of left eye [H25.12])    Surgeons: Danielle Navarro MD Responsible Provider: Jack Marmolejo APRN - CRNA    Anesthesia Type: MAC ASA Status: 3            Anesthesia Type: MAC    Divina Phase I:      Divina Phase II:      Anesthesia Post Evaluation    Patient location during evaluation: bedside  Patient participation: complete - patient participated  Level of consciousness: awake and alert  Pain score: 0  Airway patency: patent  Nausea & Vomiting: no nausea and no vomiting  Cardiovascular status: hemodynamically stable  Respiratory status: acceptable  Hydration status: stable  Pain management: adequate    No notable events documented.

## 2025-04-23 NOTE — DISCHARGE INSTRUCTIONS
POST-OPERATIVE INSTRUCTIONS FOR CATARACT SURGERY     1. No heavy lifting (no more than 5 pounds). No Bending at waist and No strenuous activity for one (1) week.     2. DO NOT RUB YOUR EYE!!! Wear eye protection at all times when going outdoors (glasses, sunglasses,        ect) and wear shield while sleeping/napping for the first week after surgery.      3. DO NOT GET WATER IN YOUR EYES FOR THE NEXT 7 DAYS. You may gently clean your face and you        may shower, but don't put any pressure on the eye. Ladies, no eye makeup for one (1) week after surgery.        Do not swim or get into a hot tub or pool for three (3) weeks.     4. You may experience eye irritation, aching, itching and blurred vision for several days. You may use over the         Counter PRESERVATIVE FREE Refresh or Systane as needed.  Use Tylenol or Ibuprofen (Motrin) for         Discomfort but DO NOT RUB YOUR EYE .     5. Call your doctor with any increased pain, redness, nausea, vomiting, fever, or worsening vision. Also call your doctor        with new flashes of light, many new floaters or a curtain/veil coming into your vision.                                               WASH YOUR HANDS BEFORE PUTTING IN YOUR DROPS.                                       ALLOW 5 MINUTES BETWEEN DIFFERENT DROPS.                          IF YOU HAVE ANY QUESTION OR CONCERNS:     DURING OFFICE HOURS CALL [phone]   OR AFTER HOURS / WEEKENDS CALL OR TEXT [phone]     If you are unable to reach us, call [phone] or go to the nearest Emergency Department

## 2025-06-02 ENCOUNTER — HOSPITAL ENCOUNTER (OUTPATIENT)
Age: 80
Discharge: HOME | End: 2025-06-05
Payer: MEDICARE

## 2025-06-02 ENCOUNTER — HOSPITAL ENCOUNTER (OUTPATIENT)
Age: 80
Discharge: HOME | End: 2025-06-05
Attending: INTERNAL MEDICINE
Payer: MEDICARE

## 2025-06-02 DIAGNOSIS — R05.1 ACUTE COUGH: ICD-10-CM

## 2025-06-02 DIAGNOSIS — Z12.31 OTHER SCREENING MAMMOGRAM: ICD-10-CM

## 2025-06-02 PROCEDURE — 77063 BREAST TOMOSYNTHESIS BI: CPT

## 2025-06-02 PROCEDURE — 71046 X-RAY EXAM CHEST 2 VIEWS: CPT

## 2025-06-04 ENCOUNTER — HOSPITAL ENCOUNTER (OUTPATIENT)
Age: 80
Discharge: HOME | End: 2025-06-06
Attending: INTERNAL MEDICINE
Payer: MEDICARE

## 2025-06-04 VITALS
HEIGHT: 67 IN | SYSTOLIC BLOOD PRESSURE: 176 MMHG | BODY MASS INDEX: 33.12 KG/M2 | WEIGHT: 211 LBS | DIASTOLIC BLOOD PRESSURE: 92 MMHG

## 2025-06-04 DIAGNOSIS — I42.9 CARDIOMYOPATHY, UNSPECIFIED TYPE (HCC): ICD-10-CM

## 2025-06-04 DIAGNOSIS — I42.2 HYPERTROPHIC CARDIOMYOPATHY (HCC): ICD-10-CM

## 2025-06-04 LAB
ECHO AO ASC DIAM: 3.4 CM
ECHO AO ASCENDING AORTA INDEX: 1.64 CM/M2
ECHO AO ROOT DIAM: 3.5 CM
ECHO AO ROOT INDEX: 1.69 CM/M2
ECHO AR MAX VEL PISA: 4.3 M/S
ECHO AV AREA PEAK VELOCITY: 2.6 CM2
ECHO AV AREA VTI: 2.6 CM2
ECHO AV AREA/BSA PEAK VELOCITY: 1.3 CM2/M2
ECHO AV AREA/BSA VTI: 1.3 CM2/M2
ECHO AV MEAN GRADIENT: 4 MMHG
ECHO AV MEAN VELOCITY: 1 M/S
ECHO AV PEAK GRADIENT: 8 MMHG
ECHO AV PEAK VELOCITY: 1.4 M/S
ECHO AV REGURGITANT PHT: 773.4 MS
ECHO AV VELOCITY RATIO: 0.71
ECHO AV VTI: 37.4 CM
ECHO BSA: 2.13 M2
ECHO EST RA PRESSURE: 3 MMHG
ECHO LA DIAMETER INDEX: 1.93 CM/M2
ECHO LA DIAMETER: 4 CM
ECHO LA TO AORTIC ROOT RATIO: 1.14
ECHO LA VOL A-L A2C: 62 ML (ref 22–52)
ECHO LA VOL A-L A4C: 88 ML (ref 22–52)
ECHO LA VOL BP: 76 ML (ref 22–52)
ECHO LA VOL MOD A2C: 60 ML (ref 22–52)
ECHO LA VOL MOD A4C: 80 ML (ref 22–52)
ECHO LA VOL/BSA BIPLANE: 37 ML/M2 (ref 16–34)
ECHO LA VOLUME AREA LENGTH: 81 ML
ECHO LA VOLUME INDEX A-L A2C: 30 ML/M2 (ref 16–34)
ECHO LA VOLUME INDEX A-L A4C: 43 ML/M2 (ref 16–34)
ECHO LA VOLUME INDEX AREA LENGTH: 39 ML/M2 (ref 16–34)
ECHO LA VOLUME INDEX MOD A2C: 29 ML/M2 (ref 16–34)
ECHO LA VOLUME INDEX MOD A4C: 39 ML/M2 (ref 16–34)
ECHO LV E' LATERAL VELOCITY: 3.9 CM/S
ECHO LV E' SEPTAL VELOCITY: 6.34 CM/S
ECHO LV EF PHYSICIAN: 62 %
ECHO LV FRACTIONAL SHORTENING: 37 % (ref 28–44)
ECHO LV INTERNAL DIMENSION DIASTOLE INDEX: 2.51 CM/M2
ECHO LV INTERNAL DIMENSION DIASTOLIC: 5.2 CM (ref 3.9–5.3)
ECHO LV INTERNAL DIMENSION SYSTOLIC INDEX: 1.59 CM/M2
ECHO LV INTERNAL DIMENSION SYSTOLIC: 3.3 CM
ECHO LV IVSD: 1.3 CM (ref 0.6–0.9)
ECHO LV MASS 2D: 293.8 G (ref 67–162)
ECHO LV MASS INDEX 2D: 141.9 G/M2 (ref 43–95)
ECHO LV POSTERIOR WALL DIASTOLIC: 1.4 CM (ref 0.6–0.9)
ECHO LV RELATIVE WALL THICKNESS RATIO: 0.54
ECHO LVOT AREA: 3.8 CM2
ECHO LVOT AV VTI INDEX: 0.72
ECHO LVOT DIAM: 2.2 CM
ECHO LVOT MEAN GRADIENT: 2 MMHG
ECHO LVOT PEAK GRADIENT: 4 MMHG
ECHO LVOT PEAK VELOCITY: 1 M/S
ECHO LVOT STROKE VOLUME INDEX: 49.4 ML/M2
ECHO LVOT SV: 102.2 ML
ECHO LVOT VTI: 26.9 CM
ECHO MV A VELOCITY: 1.02 M/S
ECHO MV AREA VTI: 2.2 CM2
ECHO MV E DECELERATION TIME (DT): 449.6 MS
ECHO MV E VELOCITY: 0.66 M/S
ECHO MV E/A RATIO: 0.65
ECHO MV E/E' LATERAL: 16.92
ECHO MV E/E' RATIO (AVERAGED): 13.67
ECHO MV E/E' SEPTAL: 10.41
ECHO MV LVOT VTI INDEX: 1.7
ECHO MV MAX VELOCITY: 1.1 M/S
ECHO MV MEAN GRADIENT: 2 MMHG
ECHO MV MEAN VELOCITY: 0.6 M/S
ECHO MV PEAK GRADIENT: 5 MMHG
ECHO MV VTI: 45.7 CM
ECHO PV MAX VELOCITY: 1 M/S
ECHO PV PEAK GRADIENT: 4 MMHG
ECHO RA END SYSTOLIC VOLUME APICAL 4 CHAMBER INDEX BSA: 19 ML/M2
ECHO RA VOLUME BIPLANE METHOD OF DISKS: 39 ML
ECHO RA VOLUME INDEX BP: 19 ML/M2
ECHO RA VOLUME: 40 ML
ECHO RIGHT VENTRICULAR SYSTOLIC PRESSURE (RVSP): 28 MMHG
ECHO RV FRACTIONAL AREA CHANGE: 71 %
ECHO RV TAPSE: 2.4 CM (ref 1.7–?)
ECHO TV REGURGITANT MAX VELOCITY: 2.48 M/S
ECHO TV REGURGITANT PEAK GRADIENT: 25 MMHG

## 2025-06-04 PROCEDURE — 93306 TTE W/DOPPLER COMPLETE: CPT

## (undated) DEVICE — Z CONVERTED USE 2271393 BANDAGE COMPR ELASTIC 4 YDX6 IN ESMRK LF

## (undated) DEVICE — SOLUTION IRRIG 250ML STRL H2O PLAS POUR BTL USP

## (undated) DEVICE — SUT ETHLN 3-0 18IN PS1 BLK --

## (undated) DEVICE — SOLUTION IRRIG 500ML STRL H2O NONPYROGENIC

## (undated) DEVICE — GLOVE SURG 7 BIOGEL PI ULTRATOUCH G

## (undated) DEVICE — INTENDED FOR TISSUE SEPARATION, AND OTHER PROCEDURES THAT REQUIRE A SHARP SURGICAL BLADE TO PUNCTURE OR CUT.: Brand: BARD-PARKER ® STAINLESS STEEL BLADES

## (undated) DEVICE — PADDING CAST W6INXL4YD SYNTHETIC NATURAL PROTOUCH

## (undated) DEVICE — PACK,EXTREMITY III: Brand: MEDLINE

## (undated) DEVICE — PREP SKN CHLRAPRP APL 26ML STR --

## (undated) DEVICE — GLOVE,SURG,SENSICARE SLT,LF,PF,6.5: Brand: MEDLINE

## (undated) DEVICE — SYR LR LCK 1ML GRAD NSAF 30ML --

## (undated) DEVICE — GOWN,PRECEPT, XLNG/XLRG ,STRL: Brand: MEDLINE

## (undated) DEVICE — SKIN MARKER,REGULAR TIP WITH RULER: Brand: DEVON

## (undated) DEVICE — TUBING, SUCTION, 9/32" X 10', STRAIGHT: Brand: MEDLINE

## (undated) DEVICE — GAUZE SPNG AVANT 4X4 4PLY NS --

## (undated) DEVICE — GOWN,AURORA,FABRIC-REINFORCED,X-LARGE: Brand: MEDLINE

## (undated) DEVICE — GLOVE SURG SZ 65 THK91MIL LTX FREE SYN POLYISOPRENE

## (undated) DEVICE — COUNTER NDL 40 COUNT HLD 70 FOAM BLK ADH W/ MAG

## (undated) DEVICE — ZIMMER® STERILE DISPOSABLE TOURNIQUET CUFF WITH PLC, DUAL PORT, SINGLE BLADDER, 34 IN. (86 CM)

## (undated) DEVICE — 4.5 MM ORBIT INCIISOR PLUS CURVED                                    BLADES, POWER/EP-1, POWER/EP-1,                                    VIOLET, CURVE LENGTH 17 MM, PACKAGED                                    6 PER BOX, STERILE

## (undated) DEVICE — NDL CNTR 40CT FM MAG: Brand: MEDLINE INDUSTRIES, INC.

## (undated) DEVICE — UNDERGLOVE SURG SZ 7.5 BLU LTX FREE SYN POLYISOPRENE

## (undated) DEVICE — BANDAGE, ELASTIC, COTTON/POLYESTER/SPANDEX, DUAL SELF CLOSURE, NON-STERILE, LATEX-FREE, NATURAL, 6"X5YD: Brand: TRONEX INTERNATIONAL, INC.

## (undated) DEVICE — NDL SPNE QNCKE 18GX3.5IN LF --

## (undated) DEVICE — Z DUP USE 2635506 SODIUM CHL 0.9% IRRIG

## (undated) DEVICE — STERILE POLYISOPRENE POWDER-FREE SURGICAL GLOVES: Brand: PROTEXIS

## (undated) DEVICE — LEGGINGS, PAIR, 33X51 XL, STERILE: Brand: MEDLINE

## (undated) DEVICE — STOCKINETTE ORTHOPEDIC IMPERV 36X9 IN STRL

## (undated) DEVICE — STERILE POLYISOPRENE POWDER-FREE SURGICAL GLOVES WITH EMOLLIENT COATING: Brand: PROTEXIS

## (undated) DEVICE — GLOVE ORANGE PI 8   MSG9080

## (undated) DEVICE — DRESSING,GAUZE,XEROFORM,CURAD,1"X8",ST: Brand: CURAD

## (undated) DEVICE — INFLOW CASSETTE TUBING, DO NOT USE IF PACKAGE IS DAMAGED: Brand: CROSSFLOW

## (undated) DEVICE — 3M™ COBAN™ NL STERILE NON-LATEX SELF-ADHERENT WRAP, 2086S, 6 IN X 5 YD (15 CM X 4,5 M), 12 ROLLS/CASE: Brand: 3M™ COBAN™

## (undated) DEVICE — COVER,MAYO STAND,STERILE: Brand: MEDLINE

## (undated) DEVICE — 3M™ STERI-STRIP™ REINFORCED ADHESIVE SKIN CLOSURES, R1547, 1/2 IN X 4 IN (12 MM X 100 MM), 6 STRIPS/ENVELOPE: Brand: 3M™ STERI-STRIP™

## (undated) DEVICE — SPONGE GZ 4X4 IN 16-PLY DETECTABLE W/ DMT MSTR TAG